# Patient Record
Sex: FEMALE | Race: WHITE | Employment: OTHER | URBAN - METROPOLITAN AREA
[De-identification: names, ages, dates, MRNs, and addresses within clinical notes are randomized per-mention and may not be internally consistent; named-entity substitution may affect disease eponyms.]

---

## 2017-01-31 ENCOUNTER — OFFICE VISIT (OUTPATIENT)
Dept: INTERNAL MEDICINE CLINIC | Age: 67
End: 2017-01-31

## 2017-01-31 VITALS
RESPIRATION RATE: 16 BRPM | WEIGHT: 178 LBS | OXYGEN SATURATION: 97 % | SYSTOLIC BLOOD PRESSURE: 131 MMHG | HEART RATE: 85 BPM | HEIGHT: 68 IN | TEMPERATURE: 97.7 F | BODY MASS INDEX: 26.98 KG/M2 | DIASTOLIC BLOOD PRESSURE: 83 MMHG

## 2017-01-31 DIAGNOSIS — J45.41 MODERATE PERSISTENT ASTHMA WITH ACUTE EXACERBATION: ICD-10-CM

## 2017-01-31 DIAGNOSIS — J45.20 MILD INTERMITTENT ASTHMA WITHOUT COMPLICATION: ICD-10-CM

## 2017-01-31 DIAGNOSIS — G43.809 OTHER MIGRAINE WITHOUT STATUS MIGRAINOSUS, NOT INTRACTABLE: ICD-10-CM

## 2017-01-31 DIAGNOSIS — J01.90 ACUTE NON-RECURRENT SINUSITIS, UNSPECIFIED LOCATION: Primary | ICD-10-CM

## 2017-01-31 DIAGNOSIS — I10 ESSENTIAL HYPERTENSION: ICD-10-CM

## 2017-01-31 RX ORDER — SUMATRIPTAN 100 MG/1
TABLET, FILM COATED ORAL
Qty: 12 TAB | Refills: 5 | Status: SHIPPED | OUTPATIENT
Start: 2017-01-31 | End: 2017-11-14 | Stop reason: SDUPTHER

## 2017-01-31 RX ORDER — CEFDINIR 300 MG/1
300 CAPSULE ORAL 2 TIMES DAILY
Qty: 20 CAP | Refills: 0 | Status: SHIPPED | OUTPATIENT
Start: 2017-01-31 | End: 2017-03-08

## 2017-01-31 RX ORDER — MONTELUKAST SODIUM 10 MG/1
10 TABLET ORAL DAILY
Qty: 30 TAB | Refills: 5 | Status: SHIPPED | OUTPATIENT
Start: 2017-01-31 | End: 2017-04-26 | Stop reason: SDUPTHER

## 2017-01-31 RX ORDER — GUAIFENESIN AND PSEUDOEPHEDRINE HCL 1200; 120 MG/1; MG/1
1 TABLET, EXTENDED RELEASE ORAL 2 TIMES DAILY
Qty: 60 TAB | Refills: 3
Start: 2017-01-31 | End: 2017-03-08

## 2017-01-31 RX ORDER — PREDNISONE 20 MG/1
TABLET ORAL
Qty: 18 TAB | Refills: 0 | Status: SHIPPED | OUTPATIENT
Start: 2017-01-31 | End: 2017-02-09

## 2017-01-31 RX ORDER — ESCITALOPRAM OXALATE 10 MG/1
10 TABLET ORAL DAILY
COMMUNITY
End: 2017-04-26 | Stop reason: SDUPTHER

## 2017-01-31 NOTE — PROGRESS NOTES
Subjective:   New pt to [facility]. Will see Dr. Toby Mclean with nasal blockage, post nasal drip and bilateral sinus pain for 8 days. Denies shortness of breath, chest pain, abdominal pain, nausea, vomiting,  fever and chills. Symptoms are severe. Recent treatment for similar symptoms? Has tried over-the-counter remedies Afrin prn with partial relief of symptoms. Contacts with similar infections: no. Asthma?:  yes. reports that she has never smoked. She has never used smokeless tobacco.    Review of Systems  Pertinent items are noted in HPI. Objective:   Blood pressure 131/83, pulse 85, temperature 97.7 °F (36.5 °C), temperature source Oral, resp. rate 16, height 5' 8\" (1.727 m), weight 178 lb (80.7 kg), last menstrual period 03/15/1998, SpO2 97 %. General:  alert, cooperative, no distress   Eyes: conjunctivae/corneas clear. Ears: normal TM's and external ear canals AU   Sinuses: paranasal sinuses with mild tenderness, Patient has severely edematous  indurated  nasal tubinates  cannot breathe out of her nose   Mouth:  normal findings: palate normal, tongue midline and normal and oropharynx pink & moist with erythema, but no exudates, ulcers or evidence of thrush   Neck: supple, symmetrical, trachea midline and no adenopathy. Heart: S1 and S2 normal, no murmurs noted. Lungs: clear to auscultation bilaterally   Abdomen: soft, non-tender.  Bowel sounds normal. No masses,  no organomegaly        Assessment/Plan:   Acute sinusitis   History of asthma, not currently in exacerbation  Significant allergic rhinitis  Relatively severe symptoms  Patient has failed  conservative therapy  Restart Singulair  Antibiotics and steroids as below  Can use Mucinex D temporarily, blood pressure is controlled  Orders Placed This Encounter    escitalopram oxalate (LEXAPRO) 10 mg tablet    cefdinir (OMNICEF) 300 mg capsule    predniSONE (DELTASONE) 20 mg tablet    PSEUDOEPHEDRINE-guaiFENesin (MUCINEX D MAXIMUM STRENGTH) Tb12 extended release tablet    montelukast (SINGULAIR) 10 mg tablet    SUMAtriptan (IMITREX) 100 mg tablet       - Seek medical care if symptoms become more severe or if you develop chest pain, shortness of breath, confusion. Contact us if your symptoms fail to improve after 7-10 days Rest as much as possible and stay home from work/school at least 24 hours   after last fever. Wash hand frequently and cough/sneeze into your sleeve to help prevent infection of others. Drink plenty of fluids  - Ibuprofen (Advil, Motrin) 400-800mg every 6 hours or Aleve 220 mg 1-2 pills every 8 hours for fever, headache, pain  - Tylenol extra strength 500 mg every 6 hours for pain, headache, fever  - Nasal saline rinses 2-3 times daily for nasal congestion  - Benadryl (diphenhydramine) 50 mg at night for nasal congestion/allergies  - Pseudophedrine 12-hour tablets twice daily for nasal and inner ear congestion.    - Afrin (oxymetazoline) nasal spray 2 sprays in each nostril twice daily for severe      congestion. Do not use this medication for more than 3 days as it may cause         \"rebound congestion\".

## 2017-01-31 NOTE — MR AVS SNAPSHOT
Visit Information Date & Time Provider Department Dept. Phone Encounter #  
 1/31/2017 10:00 AM Ila Moctezuma MD [facility] [address] [place] [id number] Upcoming Health Maintenance Date Due  
 MEDICARE YEARLY EXAM 4/13/2017 GLAUCOMA SCREENING Q2Y 8/1/2017 Pneumococcal 65+ Low/Medium Risk (2 of 2 - PPSV23) 9/27/2017 FOBT Q 1 YEAR AGE 50-75 9/27/2017 BREAST CANCER SCRN MAMMOGRAM 9/27/2018 DTaP/Tdap/Td series (2 - Td) 9/27/2026 Allergies as of 1/31/2017  Review Complete On: 1/31/2017 By: Niko Johnson Severity Noted Reaction Type Reactions Sulfa (Sulfonamide Antibiotics) High 03/15/2015    Anaphylaxis Current Immunizations  Reviewed on 1/31/2017 Name Date Influenza Vaccine 9/15/2015 Pneumococcal Conjugate (PCV-13) 10/4/2016 Reviewed by Ila Moctezuma MD on 1/31/2017 at 10:44 AM  
You Were Diagnosed With   
  
 Codes Comments Acute non-recurrent sinusitis, unspecified location    -  Primary ICD-10-CM: J01.90 ICD-9-CM: 461.9 Mild intermittent asthma without complication     NXR-03-FQ: J45.20 ICD-9-CM: 493.90 Essential hypertension     ICD-10-CM: I10 
ICD-9-CM: 401.9 Moderate persistent asthma with acute exacerbation     ICD-10-CM: J45.41 
ICD-9-CM: 493.92 Other migraine without status migrainosus, not intractable     ICD-10-CM: G43. [address] Vitals BP Pulse Temp Resp Height(growth percentile) Weight(growth percentile) 131/83 (BP 1 Location: Left arm, BP Patient Position: Sitting) 85 97.7 °F (36.5 °C) (Oral) 16 5' 8\" (1.727 m) 178 lb (80.7 kg) LMP SpO2 BMI OB Status Smoking Status 03/15/1998 97% 27.06 kg/m2 Postmenopausal Never Smoker Vitals History BMI and BSA Data Body Mass Index Body Surface Area  
 27.06 kg/m 2 1.97 m 2 Preferred Pharmacy Pharmacy Name Phone [place] [address] [address] Martha Lenz [phone] Your Updated Medication List  
  
   
This list is accurate as of: 1/31/17 10:54 AM.  Always use your most recent med list.  
  
  
  
  
 albuterol 90 mcg/actuation inhaler Commonly known as:  PROAIR HFA Take 2 Puffs by inhalation every four (4) hours as needed for Wheezing or Shortness of Breath. atorvastatin 40 mg tablet Commonly known as:  LIPITOR Take 1 Tab by mouth nightly. cefdinir 300 mg capsule Commonly known as:  OMNICEF Take 1 Cap by mouth two (2) times a day. fluticasone-salmeterol 250-50 mcg/dose diskus inhaler Commonly known as:  ADVAIR DISKUS Take 1 Puff by inhalation every twelve (12) hours. levothyroxine 50 mcg tablet Commonly known as:  synthroid Take 1 Tab by mouth Daily (before breakfast). LEXAPRO 10 mg tablet Generic drug:  escitalopram oxalate Take 10 mg by mouth daily. losartan-hydroCHLOROthiazide 100-25 mg per tablet Commonly known as:  HYZAAR Take 1 Tab by mouth daily. montelukast 10 mg tablet Commonly known as:  SINGULAIR Take 1 Tab by mouth daily. multivitamin tablet Commonly known as:  ONE A DAY Take 1 Tab by mouth daily. Omega-3-DHA-EPA-Fish Oil 1,000 mg (120 mg-180 mg) Cap Take  by mouth.  
  
 predniSONE 20 mg tablet Commonly known as:  Jade Chill Take 3 pills for 3 days, then 2 pills for 3 days, then 1 pill for 3 days PREVNAR 13 (PF) 0.5 mL Syrg injection Generic drug:  pneumococcal 13 dave conj dip  
inject 0.5 milliliter intramuscularly PSEUDOEPHEDRINE-guaiFENesin Tb12 extended release tablet Commonly known as:  [address] Take 1 Tab by mouth two (2) times a day. SUMAtriptan 100 mg tablet Commonly known as:  IMITREX Take once daily as needed for migraine VITAMIN C 500 mg tablet Generic drug:  ascorbic acid (vitamin C) Take  by mouth.  
  
 zolpidem 5 mg tablet Commonly known as:  AMBIEN  
 Take 1 Tab by mouth nightly as needed for Sleep. Max Daily Amount: 5 mg. Indications: SLEEP-ONSET INSOMNIA Prescriptions Sent to Pharmacy Refills  
 cefdinir (OMNICEF) 300 mg capsule 0 Sig: Take 1 Cap by mouth two (2) times a day. Class: Normal  
 [facility] Martha Lenz Ph #: [phone] Route: Oral  
 predniSONE (DELTASONE) 20 mg tablet 0 Sig: Take 3 pills for 3 days, then 2 pills for 3 days, then 1 pill for 3 days Class: Normal  
 [facility] Ph #: [phone]  
 montelukast (SINGULAIR) 10 mg tablet 5 Sig: Take 1 Tab by mouth daily. Class: Normal  
 [facility] [address] [place] [address] Martha Arias Ph #: [phone] Route: Oral  
 SUMAtriptan (IMITREX) 100 mg tablet 5 Sig: Take once daily as needed for migraine Class: Normal  
 [facility] [address] [place] [address] Rachelle Cordero Martha Garcia Ph #: [phone] Introducing [facility]! Dear Husam Vora: 
Thank you for requesting a [employer] account. Our records indicate that you already have an active [employer] account. You can access your account anytime at [email or web address] Did you know that you can access your hospital and ER discharge instructions at any time in [employer]? You can also review all of your test results from your hospital stay or ER visit. Additional Information If you have questions, please visit the Frequently Asked Questions section of the [employer] website at [email or web address] Remember, Game [employer]ight is NOT to be used for urgent needs. For medical emergencies, dial 911. Now available from your iPhone and Android! Please provide this summary of care documentation to your next provider. Your primary care clinician is listed as Candice Castle.  If you have any questions after today's visit, please call [phone].

## 2017-03-08 ENCOUNTER — OFFICE VISIT (OUTPATIENT)
Dept: FAMILY MEDICINE CLINIC | Facility: CLINIC | Age: 67
End: 2017-03-08

## 2017-03-08 VITALS
WEIGHT: 176.9 LBS | HEART RATE: 86 BPM | RESPIRATION RATE: 20 BRPM | TEMPERATURE: 98.2 F | HEIGHT: 68 IN | DIASTOLIC BLOOD PRESSURE: 71 MMHG | OXYGEN SATURATION: 97 % | SYSTOLIC BLOOD PRESSURE: 120 MMHG | BODY MASS INDEX: 26.81 KG/M2

## 2017-03-08 DIAGNOSIS — J06.9 UPPER RESPIRATORY TRACT INFECTION, UNSPECIFIED TYPE: ICD-10-CM

## 2017-03-08 DIAGNOSIS — J01.41 ACUTE RECURRENT PANSINUSITIS: Primary | ICD-10-CM

## 2017-03-08 DIAGNOSIS — J02.9 SORE THROAT: ICD-10-CM

## 2017-03-08 LAB
S PYO AG THROAT QL: NEGATIVE
VALID INTERNAL CONTROL?: YES

## 2017-03-08 RX ORDER — FEXOFENADINE HCL AND PSEUDOEPHEDRINE HCI 180; 240 MG/1; MG/1
1 TABLET, EXTENDED RELEASE ORAL DAILY
Qty: 30 TAB | Refills: 0 | Status: SHIPPED | OUTPATIENT
Start: 2017-03-08 | End: 2017-04-07

## 2017-03-08 RX ORDER — PREDNISONE 20 MG/1
20 TABLET ORAL 2 TIMES DAILY
Qty: 10 TAB | Refills: 0 | Status: SHIPPED | OUTPATIENT
Start: 2017-03-08 | End: 2017-03-13

## 2017-03-08 RX ORDER — LEVOFLOXACIN 750 MG/1
750 TABLET ORAL DAILY
Qty: 7 TAB | Refills: 0 | Status: SHIPPED | OUTPATIENT
Start: 2017-03-08 | End: 2017-03-15

## 2017-03-08 NOTE — PATIENT INSTRUCTIONS
Sinusitis: Care Instructions  Your Care Instructions    Sinusitis is an infection of the lining of the sinus cavities in your head. Sinusitis often follows a cold. It causes pain and pressure in your head and face. In most cases, sinusitis gets better on its own in 1 to 2 weeks. But some mild symptoms may last for several weeks. Sometimes antibiotics are needed. Follow-up care is a key part of your treatment and safety. Be sure to make and go to all appointments, and call your doctor if you are having problems. It's also a good idea to know your test results and keep a list of the medicines you take. How can you care for yourself at home? · Take an over-the-counter pain medicine, such as acetaminophen (Tylenol), ibuprofen (Advil, Motrin), or naproxen (Aleve). Read and follow all instructions on the label. · If the doctor prescribed antibiotics, take them as directed. Do not stop taking them just because you feel better. You need to take the full course of antibiotics. · Be careful when taking over-the-counter cold or flu medicines and Tylenol at the same time. Many of these medicines have acetaminophen, which is Tylenol. Read the labels to make sure that you are not taking more than the recommended dose. Too much acetaminophen (Tylenol) can be harmful. · Breathe warm, moist air from a steamy shower, a hot bath, or a sink filled with hot water. Avoid cold, dry air. Using a humidifier in your home may help. Follow the directions for cleaning the machine. · Use saline (saltwater) nasal washes to help keep your nasal passages open and wash out mucus and bacteria. You can buy saline nose drops at a grocery store or drugstore. Or you can make your own at home by adding 1 teaspoon of salt and 1 teaspoon of baking soda to 2 cups of distilled water. If you make your own, fill a bulb syringe with the solution, insert the tip into your nostril, and squeeze gently. Lorrin Fraction your nose.   · Put a hot, wet towel or a warm gel pack on your face 3 or 4 times a day for 5 to 10 minutes each time. · Try a decongestant nasal spray like oxymetazoline (Afrin). Do not use it for more than 3 days in a row. Using it for more than 3 days can make your congestion worse. When should you call for help? Call your doctor now or seek immediate medical care if:  · You have new or worse swelling or redness in your face or around your eyes. · You have a new or higher fever. Watch closely for changes in your health, and be sure to contact your doctor if:  · You have new or worse facial pain. · The mucus from your nose becomes thicker (like pus) or has new blood in it. · You are not getting better as expected. Where can you learn more? Go to http://charan-kamala.info/. Enter X624 in the search box to learn more about \"Sinusitis: Care Instructions. \"  Current as of: July 29, 2016  Content Version: 11.1  © 9526-5931 [employer]. Care instructions adapted under license by [employer] (which disclaims liability or warranty for this information). If you have questions about a medical condition or this instruction, always ask your healthcare professional. [place] [address] any warranty or liability for your use of this information. Sore Throat: Care Instructions  Your Care Instructions    Infection by bacteria or a virus causes most sore throats. Cigarette smoke, dry air, air pollution, allergies, and yelling can also cause a sore throat. Sore throats can be painful and annoying. Fortunately, most sore throats go away on their own. If you have a bacterial infection, your doctor may prescribe antibiotics. Follow-up care is a key part of your treatment and safety. Be sure to make and go to all appointments, and call your doctor if you are having problems. It's also a good idea to know your test results and keep a list of the medicines you take. How can you care for yourself at home?   · If your doctor prescribed antibiotics, take them as directed. Do not stop taking them just because you feel better. You need to take the full course of antibiotics. · Gargle with warm salt water once an hour to help reduce swelling and relieve discomfort. Use 1 teaspoon of salt mixed in 1 cup of warm water. · Take an over-the-counter pain medicine, such as acetaminophen (Tylenol), ibuprofen (Advil, Motrin), or naproxen (Aleve). Read and follow all instructions on the label. · Be careful when taking over-the-counter cold or flu medicines and Tylenol at the same time. Many of these medicines have acetaminophen, which is Tylenol. Read the labels to make sure that you are not taking more than the recommended dose. Too much acetaminophen (Tylenol) can be harmful. · Drink plenty of fluids. Fluids may help soothe an irritated throat. Hot fluids, such as tea or soup, may help decrease throat pain. · Use over-the-counter throat lozenges to soothe pain. Regular cough drops or hard candy may also help. These should not be given to young children because of the risk of choking. · Do not smoke or allow others to smoke around you. If you need help quitting, talk to your doctor about stop-smoking programs and medicines. These can increase your chances of quitting for good. · Use a vaporizer or humidifier to add moisture to your bedroom. Follow the directions for cleaning the machine. When should you call for help? Call your doctor now or seek immediate medical care if:  · You have new or worse trouble swallowing. · Your sore throat gets much worse on one side. Watch closely for changes in your health, and be sure to contact your doctor if you do not get better as expected. Where can you learn more? Go to http://charan-kamala.info/. Enter [id number] in the search box to learn more about \"Sore Throat: Care Instructions. \"  Current as of: July 29, 2016  Content Version: 11.1  © 4975-2971 [employer]. Care instructions adapted under license by [employer] (which disclaims liability or warranty for this information). If you have questions about a medical condition or this instruction, always ask your healthcare professional. [address] any warranty or liability for your use of this information. Rapid Strep Test: About This Test  What is it? A rapid strep test checks the bacteria in your throat to see if strep is the cause of your sore throat. Why is this test done? It may be done so your doctor can find out right away whether you have strep throat. There is another test for strep, called a throat culture, but that test takes a few days to get the results. How can you prepare for the test?  You don't need to do anything before you have this test.  What happens during the test?  · You will be asked to tilt your head back and open your mouth as wide as possible. · Your doctor will press your tongue down with a flat stick (tongue depressor) and then examine your mouth and throat. · A clean cotton swab will be rubbed over the back of your throat, around your tonsils, and over any red areas or sores to collect a sample. How long does the test take? · The test takes less than a minute. · Results are available in 10 to 15 minutes. When should you call for help? Call your doctor now or seek immediate medical care if:  · Your pain gets worse on one side of your throat, or you have trouble opening your mouth. · You have a new or higher fever, or you have a fever with a stiff neck or severe headache. · Swallowing becomes harder, or you have any trouble breathing. · You are sensitive to light or feel very sleepy or confused. Watch closely for changes in your health, and be sure to contact your doctor if:  · You do not start to feel better after 2 days. Follow-up care is a key part of your treatment and safety.  Be sure to make and go to all appointments, and call your doctor if you are having problems. It's also a good idea to keep a list of the medicines you take. Ask your doctor when you can expect to have your test results. Where can you learn more? Go to http://charan-kamala.info/. Enter B356 in the search box to learn more about \"Rapid Strep Test: About This Test.\"  Current as of: July 29, 2016  Content Version: 11.1  © 2006-2016 [employer]. Care instructions adapted under license by [employer] (which disclaims liability or warranty for this information). If you have questions about a medical condition or this instruction, always ask your healthcare professional. [address] any warranty or liability for your use of this information. Upper Respiratory Infection (Cold): Care Instructions  Your Care Instructions    An upper respiratory infection, or URI, is an infection of the nose, sinuses, or throat. URIs are spread by coughs, sneezes, and direct contact. The common cold is the most frequent kind of URI. The flu and sinus infections are other kinds of URIs. Almost all URIs are caused by viruses. Antibiotics won't cure them. But you can treat most infections with home care. This may include drinking lots of fluids and taking over-the-counter pain medicine. You will probably feel better in 4 to 10 days. The doctor has checked you carefully, but problems can develop later. If you notice any problems or new symptoms, get medical treatment right away. Follow-up care is a key part of your treatment and safety. Be sure to make and go to all appointments, and call your doctor if you are having problems. It's also a good idea to know your test results and keep a list of the medicines you take. How can you care for yourself at home? · To prevent dehydration, drink plenty of fluids, enough so that your urine is light yellow or clear like water.  Choose water and other caffeine-free clear liquids until you feel better. If you have kidney, heart, or liver disease and have to limit fluids, talk with your doctor before you increase the amount of fluids you drink. · Take an over-the-counter pain medicine, such as acetaminophen (Tylenol), ibuprofen (Advil, Motrin), or naproxen (Aleve). Read and follow all instructions on the label. · Before you use cough and cold medicines, check the label. These medicines may not be safe for young children or for people with certain health problems. · Be careful when taking over-the-counter cold or flu medicines and Tylenol at the same time. Many of these medicines have acetaminophen, which is Tylenol. Read the labels to make sure that you are not taking more than the recommended dose. Too much acetaminophen (Tylenol) can be harmful. · Get plenty of rest.  · Do not smoke or allow others to smoke around you. If you need help quitting, talk to your doctor about stop-smoking programs and medicines. These can increase your chances of quitting for good. When should you call for help? Call 911 anytime you think you may need emergency care. For example, call if:  · You have severe trouble breathing. Call your doctor now or seek immediate medical care if:  · You seem to be getting much sicker. · You have new or worse trouble breathing. · You have a new or higher fever. · You have a new rash. Watch closely for changes in your health, and be sure to contact your doctor if:  · You have a new symptom, such as a sore throat, an earache, or sinus pain. · You cough more deeply or more often, especially if you notice more mucus or a change in the color of your mucus. · You do not get better as expected. Where can you learn more? Go to http://charan-kamala.info/. Enter D547 in the search box to learn more about \"Upper Respiratory Infection (Cold): Care Instructions. \"  Current as of: June 30, 2016  Content Version: 11.1  © 5418-2379 [employer]. Care instructions adapted under license by [employer] (which disclaims liability or warranty for this information). If you have questions about a medical condition or this instruction, always ask your healthcare professional. [address] any warranty or liability for your use of this information.
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
## 2017-04-17 DIAGNOSIS — Z76.0 MEDICATION REFILL: ICD-10-CM

## 2017-04-17 NOTE — TELEPHONE ENCOUNTER
Has apt scheduled for next Wednesday but will run out of medication prior to the appointment date.  jong # [phone] and pharmacy

## 2017-04-18 RX ORDER — LOSARTAN POTASSIUM AND HYDROCHLOROTHIAZIDE 25; 100 MG/1; MG/1
1 TABLET ORAL DAILY
Qty: 30 TAB | Refills: 0 | Status: SHIPPED | OUTPATIENT
Start: 2017-04-18 | End: 2017-04-26 | Stop reason: SDUPTHER

## 2017-04-26 ENCOUNTER — HOSPITAL ENCOUNTER (OUTPATIENT)
Dept: LAB | Age: 67
Discharge: HOME | End: 2017-04-26
Payer: MEDICARE

## 2017-04-26 ENCOUNTER — OFFICE VISIT (OUTPATIENT)
Dept: INTERNAL MEDICINE CLINIC | Age: 67
End: 2017-04-26

## 2017-04-26 VITALS
DIASTOLIC BLOOD PRESSURE: 79 MMHG | TEMPERATURE: 98 F | OXYGEN SATURATION: 93 % | WEIGHT: 175 LBS | HEIGHT: 68 IN | SYSTOLIC BLOOD PRESSURE: 134 MMHG | BODY MASS INDEX: 26.52 KG/M2 | RESPIRATION RATE: 18 BRPM | HEART RATE: 91 BPM

## 2017-04-26 DIAGNOSIS — Z76.0 MEDICATION REFILL: ICD-10-CM

## 2017-04-26 DIAGNOSIS — Z13.39 SCREENING FOR ALCOHOLISM: ICD-10-CM

## 2017-04-26 DIAGNOSIS — L81.8 HISTORY OF BEING TATOOED: ICD-10-CM

## 2017-04-26 DIAGNOSIS — E03.9 HYPOTHYROIDISM, ADULT: Primary | ICD-10-CM

## 2017-04-26 DIAGNOSIS — R53.83 FATIGUE, UNSPECIFIED TYPE: ICD-10-CM

## 2017-04-26 DIAGNOSIS — Z12.39 BREAST CANCER SCREENING: ICD-10-CM

## 2017-04-26 DIAGNOSIS — F32.89 OTHER DEPRESSION: ICD-10-CM

## 2017-04-26 DIAGNOSIS — I10 ESSENTIAL HYPERTENSION: ICD-10-CM

## 2017-04-26 DIAGNOSIS — Z00.00 WELL ADULT EXAM: ICD-10-CM

## 2017-04-26 DIAGNOSIS — Z12.11 COLON CANCER SCREENING: ICD-10-CM

## 2017-04-26 DIAGNOSIS — G47.00 INSOMNIA, UNSPECIFIED TYPE: ICD-10-CM

## 2017-04-26 DIAGNOSIS — Z23 ENCOUNTER FOR IMMUNIZATION: ICD-10-CM

## 2017-04-26 DIAGNOSIS — E55.9 VITAMIN D DEFICIENCY: ICD-10-CM

## 2017-04-26 DIAGNOSIS — J45.41 MODERATE PERSISTENT ASTHMA WITH ACUTE EXACERBATION: ICD-10-CM

## 2017-04-26 DIAGNOSIS — Z00.00 ROUTINE GENERAL MEDICAL EXAMINATION AT A HEALTH CARE FACILITY: ICD-10-CM

## 2017-04-26 DIAGNOSIS — Z12.83 SKIN CANCER SCREENING: ICD-10-CM

## 2017-04-26 PROCEDURE — 85027 COMPLETE CBC AUTOMATED: CPT

## 2017-04-26 PROCEDURE — 80053 COMPREHEN METABOLIC PANEL: CPT

## 2017-04-26 PROCEDURE — 84443 ASSAY THYROID STIM HORMONE: CPT

## 2017-04-26 PROCEDURE — 86803 HEPATITIS C AB TEST: CPT

## 2017-04-26 PROCEDURE — 82306 VITAMIN D 25 HYDROXY: CPT

## 2017-04-26 PROCEDURE — 80061 LIPID PANEL: CPT

## 2017-04-26 RX ORDER — LEVOTHYROXINE SODIUM 50 UG/1
50 TABLET ORAL
Qty: 90 TAB | Refills: 1 | Status: SHIPPED | OUTPATIENT
Start: 2017-04-26 | End: 2018-01-17 | Stop reason: SDUPTHER

## 2017-04-26 RX ORDER — MONTELUKAST SODIUM 10 MG/1
10 TABLET ORAL DAILY
Qty: 30 TAB | Refills: 5 | Status: SHIPPED | OUTPATIENT
Start: 2017-04-26 | End: 2017-11-14 | Stop reason: SDUPTHER

## 2017-04-26 RX ORDER — LOSARTAN POTASSIUM AND HYDROCHLOROTHIAZIDE 25; 100 MG/1; MG/1
1 TABLET ORAL DAILY
Qty: 90 TAB | Refills: 3 | Status: SHIPPED | OUTPATIENT
Start: 2017-04-26 | End: 2017-11-14 | Stop reason: SDUPTHER

## 2017-04-26 RX ORDER — ALBUTEROL SULFATE 90 UG/1
2 AEROSOL, METERED RESPIRATORY (INHALATION)
Qty: 2 INHALER | Refills: 3 | Status: SHIPPED | OUTPATIENT
Start: 2017-04-26 | End: 2017-11-14 | Stop reason: SDUPTHER

## 2017-04-26 RX ORDER — ZOLPIDEM TARTRATE 5 MG/1
5 TABLET ORAL
Qty: 30 TAB | Refills: 0 | Status: SHIPPED | OUTPATIENT
Start: 2017-04-26 | End: 2017-11-14 | Stop reason: SDUPTHER

## 2017-04-26 RX ORDER — FLUTICASONE PROPIONATE AND SALMETEROL 250; 50 UG/1; UG/1
1 POWDER RESPIRATORY (INHALATION) EVERY 12 HOURS
Qty: 3 INHALER | Refills: 3 | Status: SHIPPED | OUTPATIENT
Start: 2017-04-26 | End: 2017-11-14 | Stop reason: ALTCHOICE

## 2017-04-26 RX ORDER — ESCITALOPRAM OXALATE 10 MG/1
10 TABLET ORAL DAILY
Qty: 90 TAB | Refills: 1 | Status: SHIPPED | OUTPATIENT
Start: 2017-04-26 | End: 2017-09-01 | Stop reason: SDUPTHER

## 2017-04-26 RX ORDER — ATORVASTATIN CALCIUM 40 MG/1
40 TABLET, FILM COATED ORAL
Qty: 90 TAB | Refills: 3 | Status: SHIPPED | OUTPATIENT
Start: 2017-04-26 | End: 2017-11-14 | Stop reason: SDUPTHER

## 2017-04-26 NOTE — PATIENT INSTRUCTIONS
Medicare Part B Preventive Services Limitations Recommendation Scheduled   Bone Mass Measurement  (age 72 & older, biennial) Requires diagnosis related to osteoporosis or estrogen deficiency. Biennial benefit unless patient has history of long-term glucocorticoid tx or baseline is needed because initial test was by other method     Cardiovascular Screening Blood Tests (every 5 years)  Total cholesterol, HDL, Triglycerides Order as a panel if possible     Colorectal Cancer Screening  -Fecal occult blood test (annual)  -Flexible sigmoidoscopy (5y)  -Screening colonoscopy (10y)  -Barium Enema      Counseling to Prevent Tobacco Use (up to 8 sessions per year)  - Counseling greater than 3 and up to 10 minutes  - Counseling greater than 10 minutes Patients must be asymptomatic of tobacco-related conditions to receive as preventive service     Diabetes Screening Tests (at least every 3 years, Medicare covers annually or at 6-month intervals for prediabetic patients)    Fasting blood sugar (FBS) or glucose tolerance test (GTT) Patient must be diagnosed with one of the following:  -Hypertension, Dyslipidemia, obesity, previous impaired FBS or GTT  Or any two of the following: overweight, FH of diabetes, age ? 72, history of gestational diabetes, birth of baby weighing more than 9 pounds     Diabetes Self-Management Training (DSMT) (no USPSTF recommendation) Requires referral by treating physician for patient with diabetes or renal disease. 10 hours of initial DSMT session of no less than 30 minutes each in a continuous 12-month period. 2 hours of follow-up DSMT in subsequent years.      Glaucoma Screening (no USPSTF recommendation) Diabetes mellitus, family history, , age 48 or over,  American, age 72 or over     Human Immunodeficiency Virus (HIV) Screening (annually for increased risk patients)  HIV-1 and HIV-2 by EIA, MICAH, rapid antibody test, or oral mucosa transudate Patient must be at increased risk for HIV infection per USPSTF guidelines or pregnant. Tests covered annually for patients at increased risk. Pregnant patients may receive up to 3 test during pregnancy. Medical Nutrition Therapy (MNT) (for diabetes or renal disease not recommended schedule) Requires referral by treating physician for patient with diabetes or renal disease. Can be provided in same year as diabetes self-management training (DSMT), and CMS recommends medical nutrition therapy take place after DSMT. Up to 3 hours for initial year and 2 hours in subsequent years. Shingles Vaccination A shingles vaccine is also recommended once in a lifetime after age 61     Seasonal Influenza Vaccination (annually)      Pneumococcal Vaccination (once after 72)      Hepatitis B Vaccinations (if medium/high risk) Medium/high risk factors:  End-stage renal disease,  Hemophiliacs who received Factor VIII or IX concentrates, Clients of institutions for the mentally retarded, Persons who live in the same house as a HepB virus carrier, Homosexual men, Illicit injectable drug abusers. Screening Mammography (biennial age 54-69) Annually (age 36 or over)     Screening Pap Tests and Pelvic Examination (up to age 79 and after 79 if unknown history or abnormal study last 10 years) Every 25 months except high risk     Ultrasound Screening for Abdominal Aortic Aneurysm (AAA) (once) Patient must be referred through Replaced by [facility] and not have had a screening for abdominal aortic aneurysm before under Medicare.   Limited to patients who meet one of the following criteria:  - Men who are 73-68 years old and have smoked more than 100 cigarettes in their lifetime.  -Anyone with a FH of AAA  -Anyone recommended for screening by USPSTF
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
## 2017-04-26 NOTE — PROGRESS NOTES
Erick Akins is a 69 y.o. female and presents with annual wellness visit . This pt is actually NEW to me and here to follow up on cholesterol and ashtma. . She is also here for her wellness visit for medicare. Please see last part of note re: medicare information    Pt is recently  March 2017. She is retired from [employer] in [place] for [employer]. Subjective:  Cardiovascular Review:  The patient has hypertension and hyperlipidemia. Diet and Lifestyle: generally follows a low fat low cholesterol diet, exercises regularly, nonsmoker  Home BP Monitoring: is not measured at home. Pertinent ROS: taking medications as instructed, no medication side effects noted, no TIA's, no chest pain on exertion, no dyspnea on exertion, no swelling of ankles. Asthma Review:  The patient is being seen for follow up of asthma, not currently in exacerbation. Asthma symptoms occur: infrequently. Wheezing when present is described as mild and easily relieved with rescue bronchodilator. Current limitations in activity from asthma: none. Number of days of school or work missed in the last month: 0. Regimen compliance: The patient reports adherence to this regimen. Patient does not smoke cigarettes. Allergy sx and moving into her 's house    Thyroid Review:  Patient is seen for followup of hypothyroidism. Thyroid ROS: denies fatigue, weight changes, heat/cold intolerance, bowel/skin changes or CVS symptoms. Depression Review:  Patient is seen for followup of depression. Treatment includes SSRI and no other therapies. Ongoing symptoms include none feels good. She denies depressed mood. She experiences the following side effects from the treatment: none.     Migraine  Worse in spring season and fall       Review of Systems  Constitutional: negative for fevers, chills, anorexia and weight loss  Eyes:   negative for visual disturbance and irritation  ENT:   negative for tinnitus,sore throat,nasal congestion,ear pains. hoarseness  Respiratory:  negative for cough, hemoptysis, dyspnea,wheezing  CV:   negative for chest pain, palpitations, lower extremity edema  GI:   negative for nausea, vomiting, diarrhea, abdominal pain,melena  Endo:               negative for polyuria,polydipsia,polyphagia,heat intolerance  Genitourinary: negative for frequency, dysuria and hematuria  Integument:  negative for rash and pruritus  Hematologic:  negative for easy bruising and gum/nose bleeding  Musculoskel: negative for myalgias, arthralgias, back pain, muscle weakness, joint pain  Neurological:  negative for headaches, dizziness, vertigo, memory problems and gait   Behavl/Psych: negative for feelings of anxiety, depression, mood changes    Past Medical History:   Diagnosis Date    Asthma     Elevated lipids 9/15/2015    Essential hypertension 9/15/2015    Family history of skin cancer     Hypothyroidism, adult 9/15/2015    Migraine     allergy mediated in sping and fall    Sun-damaged skin      Past Surgical History:   Procedure Laterality Date    HX  SECTION      HX CHOLECYSTECTOMY      HX TONSILLECTOMY       Social History     Social History    Marital status:      Spouse name: N/A    Number of children: N/A    Years of education: N/A     Social History Main Topics    Smoking status: Never Smoker    Smokeless tobacco: Never Used    Alcohol use 3.5 oz/week     7 Glasses of wine per week    Drug use: No    Sexual activity: Yes     Partners: Male     Other Topics Concern    None     Social History Narrative    Retired from [employer] in [place]    Stressful        Hx of situational stress     passed cancer duodenal    Mother was sick 79 yo with MS actually did well        Recently   In 2017        1 son, doing well     Family History   Problem Relation Age of Onset    MS Mother     Cancer Mother      uterus/skin cancer    Cancer Father 72     colon    Parkinson's Disease Sister 61    MS Brother      Current Outpatient Prescriptions   Medication Sig Dispense Refill    SYNTHROID 50 mcg tablet TAKE ONE TABLET BY MOUTH DAILY BEFORE BREAKFAST 90 Tab 1    losartan-hydroCHLOROthiazide (HYZAAR) 100-25 mg per tablet Take 1 Tab by mouth daily. 30 Tab 0    escitalopram oxalate (LEXAPRO) 10 mg tablet Take 10 mg by mouth daily.  montelukast (SINGULAIR) 10 mg tablet Take 1 Tab by mouth daily. 30 Tab 5    SUMAtriptan (IMITREX) 100 mg tablet Take once daily as needed for migraine 12 Tab 5    fluticasone-salmeterol (ADVAIR DISKUS) 250-50 mcg/dose diskus inhaler Take 1 Puff by inhalation every twelve (12) hours. 3 Inhaler 3    atorvastatin (LIPITOR) 40 mg tablet Take 1 Tab by mouth nightly. 90 Tab 3    Omega-3-DHA-EPA-Fish Oil 1,000 mg (120 mg-180 mg) cap Take  by mouth.  multivitamin (ONE A DAY) tablet Take 1 Tab by mouth daily.  ascorbic acid (VITAMIN C) 500 mg tablet Take  by mouth.  albuterol (PROAIR HFA) 90 mcg/actuation inhaler Take 2 Puffs by inhalation every four (4) hours as needed for Wheezing or Shortness of Breath. 1 Inhaler 3    zolpidem (AMBIEN) 5 mg tablet Take 1 Tab by mouth nightly as needed for Sleep. Max Daily Amount: 5 mg.  Indications: SLEEP-ONSET INSOMNIA 30 Tab 0     Allergies   Allergen Reactions    Sulfa (Sulfonamide Antibiotics) Anaphylaxis       Objective:  Visit Vitals    /79 (BP 1 Location: Left arm, BP Patient Position: Sitting)    Pulse 91    Temp 98 °F (36.7 °C) (Oral)    Resp 18    Ht 5' 8\" (1.727 m)    Wt 175 lb (79.4 kg)    LMP 03/15/1998    SpO2 93%    BMI 26.61 kg/m2     Physical Exam:   General appearance - alert, well appearing, and in no distress  Mental status - alert, oriented to person, place, and time  EYE-TAMIKA, EOMI, corneas normal, no foreign bodies, visual acuity normal both eyes, no periorbital cellulitis  ENT-ENT exam normal, no neck nodes or sinus tenderness  Nose - normal and patent, no erythema, discharge or polyps  Mouth - mucous membranes moist, pharynx normal without lesions  Neck - supple, no significant adenopathy   Chest - clear to auscultation, no wheezes, rales or rhonchi, symmetric air entry   Heart - normal rate, regular rhythm, normal S1, S2, no murmurs, rubs, clicks or gallops   Abdomen - soft, nontender, nondistended, no masses or organomegaly  Lymph- no adenopathy palpable  Ext-peripheral pulses normal, no pedal edema, no clubbing or cyanosis  Skin-Warm and dry. no hyperpigmentation, vitiligo, or suspicious lesions  Neuro -alert, oriented, normal speech, no focal findings or movement disorder noted  Neck-normal C-spine, no tenderness, full ROM without pain      Results for orders placed or performed in visit on 03/08/17   AMB POC RAPID STREP A   Result Value Ref Range    VALID INTERNAL CONTROL POC Yes     Group A Strep Ag Negative Negative     Prevention    Cardiovascular profile  Family hx  Exercising:  Walking dogs of trails 5 miles /day  Blood pressure:  Health healthy diet:  Diabetes:  Cholesterol:  Renal function:      Cancer risk profile  Mammogram  Colonoscopy  Skin nonhealing in 2 weeks denies lesions  Gyn abnormal bleeding/discharge/abd pain/pressure      Thyroid sx    Osteopenia prevention  Calcium 1000mg/day yes  Vitamin D 800iu/day yes    Mental health scale: 9/10  Depression  Anxiety  Sleep # of hours:  Energy Level:        Immunizations  TDAP  Pneumonia vaccine  Flu vaccine  Shingles vaccine  HPV        Ramesh Peguero was seen today for annual wellness visit. Diagnoses and all orders for this visit:    Hypothyroidism, adult  -     levothyroxine (SYNTHROID) 50 mcg tablet; Take 1 Tab by mouth Daily (before breakfast). Medication refill  -     atorvastatin (LIPITOR) 40 mg tablet; Take 1 Tab by mouth nightly. -     fluticasone-salmeterol (ADVAIR DISKUS) 250-50 mcg/dose diskus inhaler; Take 1 Puff by inhalation every twelve (12) hours.   -     albuterol (PROAIR HFA) 90 mcg/actuation inhaler; Take 2 Puffs by inhalation every four (4) hours as needed for Wheezing or Shortness of Breath. Moderate persistent asthma with acute exacerbation  -     montelukast (SINGULAIR) 10 mg tablet; Take 1 Tab by mouth daily. Insomnia, unspecified type  discsussed prefer use sparingly, she agrees, sed and sed re: long term use  -     zolpidem (AMBIEN) 5 mg tablet; Take 1 Tab by mouth nightly as needed for Sleep. Max Daily Amount: 5 mg. Indications: SLEEP-ONSET INSOMNIA    Other orders  -     escitalopram oxalate (LEXAPRO) 10 mg tablet; Take 1 Tab by mouth daily. This note will not be viewable in [address] Will follow up in 6 months for labs and bp evaluation                This is a Subsequent Medicare Annual Wellness Visit providing Personalized Prevention Plan Services (PPPS) (Performed 12 months after initial AWV and PPPS )    I have reviewed the patient's medical history in detail and updated the computerized patient record. History     Past Medical History:   Diagnosis Date    Asthma     Elevated lipids 9/15/2015    Essential hypertension 9/15/2015    Hypothyroidism, adult 9/15/2015    Migraine     allergy mediated in sping and fall    Sun-damaged skin       Past Surgical History:   Procedure Laterality Date    HX  SECTION      HX CHOLECYSTECTOMY      HX TONSILLECTOMY       Current Outpatient Prescriptions   Medication Sig Dispense Refill    atorvastatin (LIPITOR) 40 mg tablet Take 1 Tab by mouth nightly. 90 Tab 3    montelukast (SINGULAIR) 10 mg tablet Take 1 Tab by mouth daily. 30 Tab 5    escitalopram oxalate (LEXAPRO) 10 mg tablet Take 1 Tab by mouth daily. 90 Tab 1    fluticasone-salmeterol (ADVAIR DISKUS) 250-50 mcg/dose diskus inhaler Take 1 Puff by inhalation every twelve (12) hours. 3 Inhaler 3    levothyroxine (SYNTHROID) 50 mcg tablet Take 1 Tab by mouth Daily (before breakfast).  90 Tab 1    zolpidem (AMBIEN) 5 mg tablet Take 1 Tab by mouth nightly as needed for Sleep. Max Daily Amount: 5 mg. Indications: SLEEP-ONSET INSOMNIA 30 Tab 0    albuterol (PROAIR HFA) 90 mcg/actuation inhaler Take 2 Puffs by inhalation every four (4) hours as needed for Wheezing or Shortness of Breath. 2 Inhaler 3    losartan-hydroCHLOROthiazide (HYZAAR) 100-25 mg per tablet Take 1 Tab by mouth daily. 90 Tab 3    SUMAtriptan (IMITREX) 100 mg tablet Take once daily as needed for migraine 12 Tab 5    Omega-3-DHA-EPA-Fish Oil 1,000 mg (120 mg-180 mg) cap Take  by mouth.  multivitamin (ONE A DAY) tablet Take 1 Tab by mouth daily.  ascorbic acid (VITAMIN C) 500 mg tablet Take  by mouth. Allergies   Allergen Reactions    Sulfa (Sulfonamide Antibiotics) Anaphylaxis     Family History   Problem Relation Age of Onset   [place] MS Mother    [place] Cancer Mother      uterus/skin cancer    Cancer Father 72     colon    Parkinson's Disease Sister 61    MS Brother      Social History   Substance Use Topics    Smoking status: Never Smoker    Smokeless tobacco: Never Used    Alcohol use 3.5 oz/week     7 Glasses of wine per week     Patient Active Problem List   Diagnosis Code    Hypothyroidism, adult E03.9    Essential hypertension I10    Elevated lipids E78.5    Skin lesions, generalized L98.9    Asthma J45.909       Depression Risk Factor Screening:     PHQ 2 / 9, over the last two weeks 3/15/2016   Little interest or pleasure in doing things Not at all   Feeling down, depressed or hopeless Not at all   Total Score PHQ 2 0     Alcohol Risk Factor Screening: On any occasion during the past 3 months, have you had more than 3 drinks containing alcohol? No    Do you average more than 7 drinks per week? No      Functional Ability and Level of Safety:     Hearing Loss   none    Activities of Daily Living   Self-care. Requires assistance with: no ADLs    Fall Risk     Fall Risk Assessment, last 12 mths 3/15/2016   Able to walk? Yes   Fall in past 12 months?  No   Fall with injury? -   Number of falls in past 12 months -   Fall Risk Score -     Abuse Screen   Patient is not abused    Review of Systems   Constitutional: negative  Eyes: negative  Ears, nose, mouth, throat, and face: negative for hearing loss, tinnitus, ear drainage, earaches and nasal congestion  Respiratory: negative for cough, sputum, hemoptysis or pleurisy/chest pain  Cardiovascular: negative for chest pressure/discomfort, dyspnea, palpitations, irregular heart beats, near-syncope  Gastrointestinal: negative for dysphagia, odynophagia, dyspepsia, nausea and vomiting  Genitourinary:negative for frequency, dysuria, nocturia, urinary incontinence and hematuria  Hematologic/lymphatic: negative for easy bruising, bleeding and lymphadenopathy  Musculoskeletal:negative for myalgias, arthralgias, stiff joints and back pain  Neurological: negative for dizziness, vertigo, memory problems and speech problems    Physical Examination     Evaluation of Cognitive Function:  Mood/affect:  happy  Appearance: age appropriate  Family member/caregiver input: none    No exam performed today, not indicated for this part of visit. Patient Care Team:  Suzi Curiel MD as PCP - General (Internal Medicine)  Parish Santiago MD as Physician (Sleep Medicine)    Advice/Referrals/Counseling   Education and counseling provided:  End-of-Life planning (with patient's consent)  Pneumococcal Vaccine  Influenza Vaccine  Hepatitis B Vaccine  Screening Mammography  Colorectal cancer screening tests  Bone mass measurement (DEXA)      Assessment/Plan   Domitila Aburto was seen today for annual wellness visit. Diagnoses and all orders for this visit:    Pt Medicare wellness completed. Breast cancer screening  -     FRANTZ MAMMO BI SCREENING INCL CAD;  Future    Colon cancer screening  -     REFERRAL TO GASTROENTEROLOGY    Skin cancer screening  -     REFERRAL TO DERMATOLOGY    History of being tatooed  -     HEPATITIS C AB    Well adult exam    Routine general medical examination at a health care facility    Screening for alcoholism    Other orders  -     escitalopram oxalate (LEXAPRO) 10 mg tablet; Take 1 Tab by mouth daily. .      This note will not be viewable in [address]

## 2017-04-27 LAB
25(OH)D3+25(OH)D2 SERPL-MCNC: 49.1 NG/ML (ref 30–100)
ALBUMIN SERPL-MCNC: 4.4 G/DL (ref 3.6–4.8)
ALBUMIN/GLOB SERPL: 2 {RATIO} (ref 1.2–2.2)
ALP SERPL-CCNC: 60 IU/L (ref 39–117)
ALT SERPL-CCNC: 23 IU/L (ref 0–32)
AST SERPL-CCNC: 24 IU/L (ref 0–40)
BILIRUB SERPL-MCNC: 0.5 MG/DL (ref 0–1.2)
BUN SERPL-MCNC: 10 MG/DL (ref 8–27)
BUN/CREAT SERPL: 14 (ref 12–28)
CALCIUM SERPL-MCNC: 9.7 MG/DL (ref 8.7–10.3)
CHLORIDE SERPL-SCNC: 99 MMOL/L (ref 96–106)
CHOLEST SERPL-MCNC: 154 MG/DL (ref 100–199)
CO2 SERPL-SCNC: 28 MMOL/L (ref 18–29)
CREAT SERPL-MCNC: 0.73 MG/DL (ref 0.57–1)
ERYTHROCYTE [DISTWIDTH] IN BLOOD BY AUTOMATED COUNT: 14 % (ref 12.3–15.4)
GLOBULIN SER CALC-MCNC: 2.2 G/DL (ref 1.5–4.5)
GLUCOSE SERPL-MCNC: 97 MG/DL (ref 65–99)
HCT VFR BLD AUTO: 42.8 % (ref 34–46.6)
HCV AB S/CO SERPL IA: <0.1 S/CO RATIO (ref 0–0.9)
HDLC SERPL-MCNC: 72 MG/DL
HGB BLD-MCNC: 14.1 G/DL (ref 11.1–15.9)
INTERPRETATION, 910389: NORMAL
LDLC SERPL CALC-MCNC: 69 MG/DL (ref 0–99)
MCH RBC QN AUTO: 28.8 PG (ref 26.6–33)
MCHC RBC AUTO-ENTMCNC: 32.9 G/DL (ref 31.5–35.7)
MCV RBC AUTO: 88 FL (ref 79–97)
PLATELET # BLD AUTO: 296 X10E3/UL (ref 150–379)
POTASSIUM SERPL-SCNC: 3.7 MMOL/L (ref 3.5–5.2)
PROT SERPL-MCNC: 6.6 G/DL (ref 6–8.5)
RBC # BLD AUTO: 4.89 X10E6/UL (ref 3.77–5.28)
SODIUM SERPL-SCNC: 144 MMOL/L (ref 134–144)
TRIGL SERPL-MCNC: 67 MG/DL (ref 0–149)
TSH SERPL DL<=0.005 MIU/L-ACNC: 2.02 UIU/ML (ref 0.45–4.5)
VLDLC SERPL CALC-MCNC: 13 MG/DL (ref 5–40)
WBC # BLD AUTO: 6.6 X10E3/UL (ref 3.4–10.8)

## 2017-05-23 ENCOUNTER — HOSPITAL ENCOUNTER (OUTPATIENT)
Dept: MAMMOGRAPHY | Age: 67
Discharge: HOME | End: 2017-05-23
Attending: INTERNAL MEDICINE
Payer: MEDICARE

## 2017-05-23 DIAGNOSIS — Z12.31 VISIT FOR SCREENING MAMMOGRAM: ICD-10-CM

## 2017-05-23 PROCEDURE — 77067 SCR MAMMO BI INCL CAD: CPT

## 2017-07-11 ENCOUNTER — OFFICE VISIT (OUTPATIENT)
Dept: INTERNAL MEDICINE CLINIC | Age: 67
End: 2017-07-11

## 2017-07-11 VITALS
WEIGHT: 175 LBS | HEART RATE: 99 BPM | HEIGHT: 68 IN | DIASTOLIC BLOOD PRESSURE: 58 MMHG | SYSTOLIC BLOOD PRESSURE: 107 MMHG | RESPIRATION RATE: 14 BRPM | BODY MASS INDEX: 26.52 KG/M2 | TEMPERATURE: 98.1 F | OXYGEN SATURATION: 96 %

## 2017-07-11 DIAGNOSIS — J45.20 MILD INTERMITTENT ASTHMA WITHOUT COMPLICATION: ICD-10-CM

## 2017-07-11 DIAGNOSIS — H57.9 ALLERGIC EYE REACTION: Primary | ICD-10-CM

## 2017-07-11 DIAGNOSIS — I10 ESSENTIAL HYPERTENSION: ICD-10-CM

## 2017-07-11 RX ORDER — METHYLPREDNISOLONE 4 MG/1
TABLET ORAL
Qty: 1 DOSE PACK | Refills: 0 | Status: SHIPPED | OUTPATIENT
Start: 2017-07-11 | End: 2017-08-22 | Stop reason: ALTCHOICE

## 2017-07-11 RX ORDER — METHYLPREDNISOLONE 4 MG/1
TABLET ORAL
Qty: 1 DOSE PACK | Refills: 0 | Status: SHIPPED | OUTPATIENT
Start: 2017-07-11 | End: 2017-07-11 | Stop reason: SDUPTHER

## 2017-07-11 NOTE — PROGRESS NOTES
HISTORY OF PRESENT ILLNESS  Juana Reyna is a 69 y.o. female. HPI   Patient presetns today for eye itchiness. She states that she wakes up with itchiness in her eyes. In addition she states she has some stabbing pain in her eyes. She notes her eyes have been watering and sticky. Patient notes some itchiness in her hands as well. Patient denies nausea, SOB, wheezing, or fever. She states she has been trying to treat with OTC medications. She states she went 4 days without makeup and there was no change in her condition. Patient reports she got  in March and moved in with her  who has a cat. She states she has only had dogs and does not know if she is allergic to cats. Patient reports her breathing has not been affected by the eye itchiness. Hypertension ROS: taking medications as instructed, no medication side effects noted, no TIA's, no chest pain on exertion, no dyspnea on exertion, no swelling of ankles. New concerns:  Patient's BP in office today is 107/58. Review of Systems   All other systems reviewed and are negative. Physical Exam   Constitutional: She is oriented to person, place, and time. She appears well-developed and well-nourished. HENT:   Head: Normocephalic and atraumatic. Right Ear: External ear normal.   Left Ear: External ear normal.   Nose: Nose normal.   Mouth/Throat: Oropharynx is clear and moist.   Eyes: Conjunctivae and EOM are normal.   Neck: Normal range of motion. Neck supple. Carotid bruit is not present. No thyroid mass and no thyromegaly present. Cardiovascular: Normal rate, regular rhythm, S1 normal, S2 normal, normal heart sounds and intact distal pulses. Pulmonary/Chest: Effort normal and breath sounds normal.   Abdominal: Soft. Normal appearance and bowel sounds are normal. There is no hepatosplenomegaly. There is no tenderness. Musculoskeletal: Normal range of motion. Neurological: She is alert and oriented to person, place, and time.  She has normal strength. No cranial nerve deficit or sensory deficit. Coordination normal.   Skin: Skin is warm, dry and intact. No abrasion and no rash noted. Psychiatric: She has a normal mood and affect. Her behavior is normal. Judgment and thought content normal.   Nursing note and vitals reviewed. ASSESSMENT and PLAN  Akbar Acevedo was seen today for itchy eye. Diagnoses and all orders for this visit:    Allergic eye reaction   Eyes do not look infected, they are watery, sticky, and irritated. Prescribed Prednisone as a tempory solution. Encourage patient to look into an allergist and to follow up with optometry for an eye exam. Suspect something at home is exacerbating the eye itchiness. Maybe the fact she moved in with her  in a new house and with a cat and she has a h/o sig allergies when she was younger- she needs to look into this    Mild intermittent asthma without complication  Patient breathing not affected by eye reaction    Essential hypertension  BP is at goal. I do not recommend any change in medications. Other orders  -     Discontinue: methylPREDNISolone (MEDROL DOSEPACK) 4 mg tablet; uad  -     methylPREDNISolone (MEDROL DOSEPACK) 4 mg tablet; uad    lab results and schedule of future lab studies reviewed with patient  reviewed diet, exercise and weight control    Written by Keira Li, as dictated by Shanique Lind MD.     Current diagnosis and concerns discussed with pt at length. Understands risks and benefits or current treatment plan and medications and accepts the treatment and medication with any possible risks. Pt asks appropriate questions which were answered. Pt instructed to call with any concerns or problems.

## 2017-08-22 ENCOUNTER — OFFICE VISIT (OUTPATIENT)
Dept: INTERNAL MEDICINE CLINIC | Age: 67
End: 2017-08-22

## 2017-08-22 VITALS
HEART RATE: 87 BPM | TEMPERATURE: 98.8 F | SYSTOLIC BLOOD PRESSURE: 133 MMHG | BODY MASS INDEX: 26.76 KG/M2 | RESPIRATION RATE: 14 BRPM | DIASTOLIC BLOOD PRESSURE: 75 MMHG | WEIGHT: 176.6 LBS | OXYGEN SATURATION: 96 % | HEIGHT: 68 IN

## 2017-08-22 DIAGNOSIS — J40 BRONCHITIS: ICD-10-CM

## 2017-08-22 DIAGNOSIS — J01.40 ACUTE PANSINUSITIS, RECURRENCE NOT SPECIFIED: Primary | ICD-10-CM

## 2017-08-22 DIAGNOSIS — J45.41 MODERATE PERSISTENT ASTHMA WITH ACUTE EXACERBATION: ICD-10-CM

## 2017-08-22 RX ORDER — FLUTICASONE PROPIONATE 50 MCG
2 SPRAY, SUSPENSION (ML) NASAL DAILY
COMMUNITY
End: 2018-06-28 | Stop reason: ALTCHOICE

## 2017-08-22 RX ORDER — LEVOFLOXACIN 750 MG/1
750 TABLET ORAL DAILY
Qty: 10 TAB | Refills: 0 | Status: SHIPPED | OUTPATIENT
Start: 2017-08-22 | End: 2017-11-14 | Stop reason: ALTCHOICE

## 2017-08-22 RX ORDER — LEVALBUTEROL INHALATION SOLUTION 1.25 MG/3ML
1.25 SOLUTION RESPIRATORY (INHALATION)
Qty: 3 ML | Refills: 0
Start: 2017-08-22 | End: 2017-08-22

## 2017-08-22 RX ORDER — PREDNISONE 20 MG/1
TABLET ORAL
Qty: 18 TAB | Refills: 0 | Status: SHIPPED | OUTPATIENT
Start: 2017-08-22 | End: 2017-11-14 | Stop reason: SDUPTHER

## 2017-08-22 RX ORDER — CODEINE PHOSPHATE AND GUAIFENESIN 10; 100 MG/5ML; MG/5ML
5 SOLUTION ORAL
Qty: 118 ML | Refills: 0 | Status: SHIPPED | OUTPATIENT
Start: 2017-08-22 | End: 2017-11-14 | Stop reason: ALTCHOICE

## 2017-08-22 NOTE — MR AVS SNAPSHOT
Visit Information Date & Time Provider Department Dept. Phone Encounter #  
 8/22/2017  2:00 PM Al Garcia NP [facility] [address] [id number] Upcoming Health Maintenance Date Due  
 GLAUCOMA SCREENING Q2Y 8/1/2017 INFLUENZA AGE 9 TO ADULT 8/1/2017 FOBT Q 1 YEAR AGE 50-75 9/27/2017 MEDICARE YEARLY EXAM 4/27/2018 BREAST CANCER SCRN MAMMOGRAM 5/23/2019 DTaP/Tdap/Td series (2 - Td) 9/27/2026 Allergies as of 8/22/2017  Review Complete On: 8/22/2017 By: Al Garcia NP Severity Noted Reaction Type Reactions Sulfa (Sulfonamide Antibiotics) High 03/15/2015    Anaphylaxis Current Immunizations  Reviewed on 1/31/2017 Name Date Influenza Vaccine 9/15/2015 Pneumococcal Conjugate (PCV-13) 10/4/2016 Not reviewed this visit You Were Diagnosed With   
  
 Codes Comments Acute pansinusitis, recurrence not specified    -  Primary ICD-10-CM: J01.40 ICD-9-CM: 461.8 Bronchitis     ICD-10-CM: J40 ICD-9-CM: 313 Moderate persistent asthma with acute exacerbation     ICD-10-CM: J45.41 
ICD-9-CM: 493.92 Vitals BP Pulse Temp Resp Height(growth percentile) Weight(growth percentile) 133/75 (BP 1 Location: Left arm, BP Patient Position: Sitting) 87 98.8 °F (37.1 °C) (Oral) 14 5' 8\" (1.727 m) 176 lb 9.6 oz (80.1 kg) LMP SpO2 BMI OB Status Smoking Status 03/15/1998 96% 26.85 kg/m2 Postmenopausal Never Smoker BMI and BSA Data Body Mass Index Body Surface Area  
 26.85 kg/m 2 1.96 m 2 Preferred Pharmacy Pharmacy Name Phone [facility] [address] Your Updated Medication List  
  
   
This list is accurate as of: 8/22/17  3:12 PM.  Always use your most recent med list.  
  
  
  
  
 albuterol 90 mcg/actuation inhaler Commonly known as:  PROAIR HFA  
 Take 2 Puffs by inhalation every four (4) hours as needed for Wheezing or Shortness of Breath. atorvastatin 40 mg tablet Commonly known as:  LIPITOR Take 1 Tab by mouth nightly. escitalopram oxalate 10 mg tablet Commonly known as:  Kathryn Polio Take 1 Tab by mouth daily. FLONASE 50 mcg/actuation nasal spray Generic drug:  fluticasone 2 Sprays by Both Nostrils route daily. fluticasone-salmeterol 250-50 mcg/dose diskus inhaler Commonly known as:  ADVAIR DISKUS Take 1 Puff by inhalation every twelve (12) hours. guaiFENesin-codeine 100-10 mg/5 mL solution Commonly known as:  ROBITUSSIN AC Take 5 mL by mouth three (3) times daily as needed for Cough. Max Daily Amount: 15 mL. levalbuterol 1.25 mg/3 mL Nebu Commonly known as:  XOPENEX  
3 mL by Nebulization route now for 1 dose. levoFLOXacin 750 mg tablet Commonly known as:  Bernice Boeck Take 1 Tab by mouth daily. levothyroxine 50 mcg tablet Commonly known as:  synthroid Take 1 Tab by mouth Daily (before breakfast). losartan-hydroCHLOROthiazide 100-25 mg per tablet Commonly known as:  HYZAAR Take 1 Tab by mouth daily. montelukast 10 mg tablet Commonly known as:  SINGULAIR Take 1 Tab by mouth daily. multivitamin tablet Commonly known as:  ONE A DAY Take 1 Tab by mouth daily. Omega-3-DHA-EPA-Fish Oil 1,000 mg (120 mg-180 mg) Cap Take  by mouth.  
  
 predniSONE 20 mg tablet Commonly known as:  [facility] Take 3 tabs daily x 3 days then 2 tabs daily x 3 days then 1 tab daily x 2 days, 1/2 tab daily x 2 days SUMAtriptan 100 mg tablet Commonly known as:  IMITREX Take once daily as needed for migraine VITAMIN C 500 mg tablet Generic drug:  ascorbic acid (vitamin C) Take  by mouth.  
  
 zolpidem 5 mg tablet Commonly known as:  AMBIEN Take 1 Tab by mouth nightly as needed for Sleep. Max Daily Amount: 5 mg.  Indications: SLEEP-ONSET INSOMNIA  
  
  
 Prescriptions Printed Refills  
 guaiFENesin-codeine (ROBITUSSIN AC) 100-10 mg/5 mL solution 0 Sig: Take 5 mL by mouth three (3) times daily as needed for Cough. Max Daily Amount: 15 mL. Class: Print Route: Oral  
  
Prescriptions Sent to Pharmacy Refills  
 predniSONE (DELTASONE) 20 mg tablet 0 Sig: Take 3 tabs daily x 3 days then 2 tabs daily x 3 days then 1 tab daily x 2 days, 1/2 tab daily x 2 days Class: Normal  
 Pharmacy: [address] Ph #: [phone]  
 levoFLOXacin (LEVAQUIN) 750 mg tablet 0 Sig: Take 1 Tab by mouth daily. Class: Normal  
 Pharmacy: [address] [place] [address] Ph #: [phone] Route: Oral  
  
We Performed the Following LEVALBUTEROL, INHAL. SOL., FDA-APPROVED FINAL, NON-COMPOUND UNIT DOSE, 0.5 MG [ [facility]] [place] INHAL RX, AIRWAY OBST/DX SPUTUM INDUCT [id number] CPT(R)] REFERRAL TO ALLERGY [REF5 Custom] Comments:  
 Please evaluate patient for persistent allergies Referral Information Referral ID Referred By Referred To  
  
 [id number] Donnie Capellan, [address] Phone: [phone] Fax: [phone] Visits Status Start Date End Date 1 New Request 8/22/17 8/22/18 If your referral has a status of pending review or denied, additional information will be sent to support the outcome of this decision. Patient Instructions Asthma in Adults: Care Instructions Your Care Instructions During an asthma attack, your airways swell and narrow as a reaction to certain things (triggers). This makes it hard to breathe. You may be able to prevent asthma attacks if you avoid the things that set off your asthma symptoms. Keeping your asthma under control and treating symptoms before they get bad can help you avoid severe attacks. If you can control your asthma, you may be able to do all of your normal daily activities. You may also avoid asthma attacks and trips to the hospital. 
Follow-up care is a key part of your treatment and safety. Be sure to make and go to all appointments, and call your doctor if you are having problems. It's also a good idea to know your test results and keep a list of the medicines you take. How can you care for yourself at home? · Follow your asthma action plan so you can manage your symptoms at home. An asthma action plan will help you prevent and control airway reactions and will tell you what to do during an asthma attack. If you do not have an asthma action plan, work with your doctor to build one. · Take your asthma medicine exactly as prescribed. Medicine plays an important role in controlling asthma. Talk to your doctor right away if you have any questions about what to take and how to take it. ¨ Use your quick-relief medicine when you have symptoms of an attack. Quick-relief medicine often is an albuterol inhaler. Some people need to use quick-relief medicine before they exercise. ¨ Take your controller medicine every day, not just when you have symptoms. Controller medicine is usually an inhaled corticosteroid. The goal is to prevent problems before they occur. Do not use your controller medicine to try to treat an attack that has already started. It does not work fast enough to help. ¨ If your doctor prescribed corticosteroid pills to use during an attack, take them as directed. They may take hours to work, but they may shorten the attack and help you breathe better. ¨ Keep your quick-relief medicine with you at all times. · Talk to your doctor before using other medicines. Some medicines, such as aspirin, can cause asthma attacks in some people. · Check yourself for asthma symptoms to know which step to follow in your action plan.  Watch for things like being short of breath, having chest tightness, coughing, and wheezing. Also notice if symptoms wake you up at night or if you get tired quickly when you exercise. · If you have a peak flow meter, use it to check how well you are breathing. This can help you predict when an asthma attack is going to occur. Then you can take medicine to prevent the asthma attack or make it less severe. · See your doctor regularly. These visits will help you learn more about asthma and what you can do to control it. Your doctor will monitor your treatment to make sure the medicine is helping you. · Keep track of your asthma attacks and your treatment. After you have had an attack, write down what triggered it, what helped end it, and any concerns you have about your asthma action plan. Take your diary when you see your doctor. You can then review your asthma action plan and decide if it is working. · Do not smoke or allow others to smoke around you. Avoid smoky places. Smoking makes asthma worse. If you need help quitting, talk to your doctor about stop-smoking programs and medicines. These can increase your chances of quitting for good. · Learn what triggers an asthma attack for you, and avoid the triggers when you can. Common triggers include colds, smoke, air pollution, dust, pollen, mold, pets, cockroaches, stress, and cold air. · Avoid colds and the flu. Get a pneumococcal vaccine shot. If you have had one before, ask your doctor whether you need a second dose. Get a flu vaccine every fall. If you must be around people with colds or the flu, wash your hands often. When should you call for help? Call 911 anytime you think you may need emergency care. For example, call if: 
· You have severe trouble breathing. Call your doctor now or seek immediate medical care if: 
· Your symptoms do not get better after you have followed your asthma action plan. · You cough up yellow, dark brown, or bloody mucus (sputum). Watch closely for changes in your health, and be sure to contact your doctor if: 
· Your coughing and wheezing get worse. · You need to use quick-relief medicine on more than 2 days a week (unless it is just for exercise). · You need help figuring out what is triggering your asthma attacks. Where can you learn more? Go to http://charan-kamala.info/. Enter P597 in the search box to learn more about \"Asthma in Adults: Care Instructions. \" Current as of: March 25, 2017 Content Version: 11.3 © 5934-8115 apomio. Care instructions adapted under license by [employer] (which disclaims liability or warranty for this information). If you have questions about a medical condition or this instruction, always ask your healthcare professional. [address] any warranty or liability for your use of this information. Bronchitis: Care Instructions Your Care Instructions Bronchitis is inflammation of the bronchial tubes, which carry air to the lungs. The tubes swell and produce mucus, or phlegm. The mucus and inflamed bronchial tubes make you cough. You may have trouble breathing. Most cases of bronchitis are caused by viruses like those that cause colds. Antibiotics usually do not help and they may be harmful. Bronchitis usually develops rapidly and lasts about 2 to 3 weeks in otherwise healthy people. Follow-up care is a key part of your treatment and safety. Be sure to make and go to all appointments, and call your doctor if you are having problems. It's also a good idea to know your test results and keep a list of the medicines you take. How can you care for yourself at home? · Take all medicines exactly as prescribed. Call your doctor if you think you are having a problem with your medicine.  
· Get some extra rest. 
· Take an over-the-counter pain medicine, such as acetaminophen (Tylenol), ibuprofen (Advil, Motrin), or naproxen (Aleve) to reduce fever and relieve body aches. Read and follow all instructions on the label. · Do not take two or more pain medicines at the same time unless the doctor told you to. Many pain medicines have acetaminophen, which is Tylenol. Too much acetaminophen (Tylenol) can be harmful. · Take an over-the-counter cough medicine that contains dextromethorphan to help quiet a dry, hacking cough so that you can sleep. Avoid cough medicines that have more than one active ingredient. Read and follow all instructions on the label. · Breathe moist air from a humidifier, hot shower, or sink filled with hot water. The heat and moisture will thin mucus so you can cough it out. · Do not smoke. Smoking can make bronchitis worse. If you need help quitting, talk to your doctor about stop-smoking programs and medicines. These can increase your chances of quitting for good. When should you call for help? Call 911 anytime you think you may need emergency care. For example, call if: 
· You have severe trouble breathing. Call your doctor now or seek immediate medical care if: 
· You have new or worse trouble breathing. · You cough up dark brown or bloody mucus (sputum). · You have a new or higher fever. · You have a new rash. Watch closely for changes in your health, and be sure to contact your doctor if: 
· You cough more deeply or more often, especially if you notice more mucus or a change in the color of your mucus. · You are not getting better as expected. Where can you learn more? Go to http://charan-kamala.info/. Enter H333 in the search box to learn more about \"Bronchitis: Care Instructions. \" Current as of: March 25, 2017 Content Version: 11.3 © 5198-6192 [employer].  Care instructions adapted under license by [employer] (which disclaims liability or warranty for this information). If you have questions about a medical condition or this instruction, always ask your healthcare professional. [address] any warranty or liability for your use of this information. Sinusitis: Care Instructions Your Care Instructions Sinusitis is an infection of the lining of the sinus cavities in your head. Sinusitis often follows a cold. It causes pain and pressure in your head and face. In most cases, sinusitis gets better on its own in 1 to 2 weeks. But some mild symptoms may last for several weeks. Sometimes antibiotics are needed. Follow-up care is a key part of your treatment and safety. Be sure to make and go to all appointments, and call your doctor if you are having problems. It's also a good idea to know your test results and keep a list of the medicines you take. How can you care for yourself at home? · Take an over-the-counter pain medicine, such as acetaminophen (Tylenol), ibuprofen (Advil, Motrin), or naproxen (Aleve). Read and follow all instructions on the label. · If the doctor prescribed antibiotics, take them as directed. Do not stop taking them just because you feel better. You need to take the full course of antibiotics. · Be careful when taking over-the-counter cold or flu medicines and Tylenol at the same time. Many of these medicines have acetaminophen, which is Tylenol. Read the labels to make sure that you are not taking more than the recommended dose. Too much acetaminophen (Tylenol) can be harmful. · Breathe warm, moist air from a steamy shower, a hot bath, or a sink filled with hot water. Avoid cold, dry air. Using a humidifier in your home may help. Follow the directions for cleaning the machine. · Use saline (saltwater) nasal washes to help keep your nasal passages open and wash out mucus and bacteria. You can buy saline nose drops at a grocery store or drugstore.  Or you can make your own at home by adding 1 teaspoon of salt and 1 teaspoon of baking soda to 2 cups of distilled water. If you make your own, fill a bulb syringe with the solution, insert the tip into your nostril, and squeeze gently. [place] [address] your nose. · Put a hot, wet towel or a warm gel pack on your face 3 or 4 times a day for 5 to 10 minutes each time. · Try a decongestant nasal spray like oxymetazoline (Afrin). Do not use it for more than 3 days in a row. Using it for more than 3 days can make your congestion worse. When should you call for help? Call your doctor now or seek immediate medical care if: 
· You have new or worse swelling or redness in your face or around your eyes. · You have a new or higher fever. Watch closely for changes in your health, and be sure to contact your doctor if: 
· You have new or worse facial pain. · The mucus from your nose becomes thicker (like pus) or has new blood in it. · You are not getting better as expected. Where can you learn more? Go to http://charan-kamala.info/. Enter N004 in the search box to learn more about \"Sinusitis: Care Instructions. \" Current as of: July 29, 2016 Content Version: 11.3 © 5198-8524 [employer]You. Care instructions adapted under license by [employer] (which disclaims liability or warranty for this information). If you have questions about a medical condition or this instruction, always ask your healthcare professional. [place] [address] any warranty or liability for your use of this information. Introducing [facility]! Dear Melanie Dad: 
Thank you for requesting a [employer] account. Our records indicate that you have previously registered for a ThriveOn account but its currently inactive. Please call our ThriveOn support line at [phone]. Additional Information If you have questions, please visit the Frequently Asked Questions section of the [employer] website at [email or web address] Remember, MyChart is NOT to be used for urgent needs. For medical emergencies, dial 911. Now available from your iPhone and Android! Please provide this summary of care documentation to your next provider. Your primary care clinician is listed as Jonh Lopez. If you have any questions after today's visit, please call [phone].

## 2017-08-22 NOTE — PATIENT INSTRUCTIONS
Asthma in Adults: Care Instructions  Your Care Instructions    During an asthma attack, your airways swell and narrow as a reaction to certain things (triggers). This makes it hard to breathe. You may be able to prevent asthma attacks if you avoid the things that set off your asthma symptoms. Keeping your asthma under control and treating symptoms before they get bad can help you avoid severe attacks. If you can control your asthma, you may be able to do all of your normal daily activities. You may also avoid asthma attacks and trips to the hospital.  Follow-up care is a key part of your treatment and safety. Be sure to make and go to all appointments, and call your doctor if you are having problems. It's also a good idea to know your test results and keep a list of the medicines you take. How can you care for yourself at home? · Follow your asthma action plan so you can manage your symptoms at home. An asthma action plan will help you prevent and control airway reactions and will tell you what to do during an asthma attack. If you do not have an asthma action plan, work with your doctor to build one. · Take your asthma medicine exactly as prescribed. Medicine plays an important role in controlling asthma. Talk to your doctor right away if you have any questions about what to take and how to take it. ¨ Use your quick-relief medicine when you have symptoms of an attack. Quick-relief medicine often is an albuterol inhaler. Some people need to use quick-relief medicine before they exercise. ¨ Take your controller medicine every day, not just when you have symptoms. Controller medicine is usually an inhaled corticosteroid. The goal is to prevent problems before they occur. Do not use your controller medicine to try to treat an attack that has already started. It does not work fast enough to help. ¨ If your doctor prescribed corticosteroid pills to use during an attack, take them as directed.  They may take hours to work, but they may shorten the attack and help you breathe better. ¨ Keep your quick-relief medicine with you at all times. · Talk to your doctor before using other medicines. Some medicines, such as aspirin, can cause asthma attacks in some people. · Check yourself for asthma symptoms to know which step to follow in your action plan. Watch for things like being short of breath, having chest tightness, coughing, and wheezing. Also notice if symptoms wake you up at night or if you get tired quickly when you exercise. · If you have a peak flow meter, use it to check how well you are breathing. This can help you predict when an asthma attack is going to occur. Then you can take medicine to prevent the asthma attack or make it less severe. · See your doctor regularly. These visits will help you learn more about asthma and what you can do to control it. Your doctor will monitor your treatment to make sure the medicine is helping you. · Keep track of your asthma attacks and your treatment. After you have had an attack, write down what triggered it, what helped end it, and any concerns you have about your asthma action plan. Take your diary when you see your doctor. You can then review your asthma action plan and decide if it is working. · Do not smoke or allow others to smoke around you. Avoid smoky places. Smoking makes asthma worse. If you need help quitting, talk to your doctor about stop-smoking programs and medicines. These can increase your chances of quitting for good. · Learn what triggers an asthma attack for you, and avoid the triggers when you can. Common triggers include colds, smoke, air pollution, dust, pollen, mold, pets, cockroaches, stress, and cold air. · Avoid colds and the flu. Get a pneumococcal vaccine shot. If you have had one before, ask your doctor whether you need a second dose. Get a flu vaccine every fall.  If you must be around people with colds or the flu, wash your hands often.  When should you call for help? Call 911 anytime you think you may need emergency care. For example, call if:  · You have severe trouble breathing. Call your doctor now or seek immediate medical care if:  · Your symptoms do not get better after you have followed your asthma action plan. · You cough up yellow, dark brown, or bloody mucus (sputum). Watch closely for changes in your health, and be sure to contact your doctor if:  · Your coughing and wheezing get worse. · You need to use quick-relief medicine on more than 2 days a week (unless it is just for exercise). · You need help figuring out what is triggering your asthma attacks. Where can you learn more? Go to http://charan-kamala.info/. Enter P597 in the search box to learn more about \"Asthma in Adults: Care Instructions. \"  Current as of: March 25, 2017  Content Version: 11.3  © 2143-6873 [facility]. Care instructions adapted under license by [employer] (which disclaims liability or warranty for this information). If you have questions about a medical condition or this instruction, always ask your healthcare professional. [place] [address] any warranty or liability for your use of this information. Bronchitis: Care Instructions  Your Care Instructions    Bronchitis is inflammation of the bronchial tubes, which carry air to the lungs. The tubes swell and produce mucus, or phlegm. The mucus and inflamed bronchial tubes make you cough. You may have trouble breathing. Most cases of bronchitis are caused by viruses like those that cause colds. Antibiotics usually do not help and they may be harmful. Bronchitis usually develops rapidly and lasts about 2 to 3 weeks in otherwise healthy people. Follow-up care is a key part of your treatment and safety. Be sure to make and go to all appointments, and call your doctor if you are having problems.  It's also a good idea to know your test results and keep a list of the medicines you take. How can you care for yourself at home? · Take all medicines exactly as prescribed. Call your doctor if you think you are having a problem with your medicine. · Get some extra rest.  · Take an over-the-counter pain medicine, such as acetaminophen (Tylenol), ibuprofen (Advil, Motrin), or naproxen (Aleve) to reduce fever and relieve body aches. Read and follow all instructions on the label. · Do not take two or more pain medicines at the same time unless the doctor told you to. Many pain medicines have acetaminophen, which is Tylenol. Too much acetaminophen (Tylenol) can be harmful. · Take an over-the-counter cough medicine that contains dextromethorphan to help quiet a dry, hacking cough so that you can sleep. Avoid cough medicines that have more than one active ingredient. Read and follow all instructions on the label. · Breathe moist air from a humidifier, hot shower, or sink filled with hot water. The heat and moisture will thin mucus so you can cough it out. · Do not smoke. Smoking can make bronchitis worse. If you need help quitting, talk to your doctor about stop-smoking programs and medicines. These can increase your chances of quitting for good. When should you call for help? Call 911 anytime you think you may need emergency care. For example, call if:  · You have severe trouble breathing. Call your doctor now or seek immediate medical care if:  · You have new or worse trouble breathing. · You cough up dark brown or bloody mucus (sputum). · You have a new or higher fever. · You have a new rash. Watch closely for changes in your health, and be sure to contact your doctor if:  · You cough more deeply or more often, especially if you notice more mucus or a change in the color of your mucus. · You are not getting better as expected. Where can you learn more? Go to http://charan-kamala.info/.   Enter H333 in the search box to learn more about \"Bronchitis: Care Instructions. \"  Current as of: March 25, 2017  Content Version: 11.3  © 1763-8270 [employer]. Care instructions adapted under license by [employer] (which disclaims liability or warranty for this information). If you have questions about a medical condition or this instruction, always ask your healthcare professional. [address] any warranty or liability for your use of this information. Sinusitis: Care Instructions  Your Care Instructions    Sinusitis is an infection of the lining of the sinus cavities in your head. Sinusitis often follows a cold. It causes pain and pressure in your head and face. In most cases, sinusitis gets better on its own in 1 to 2 weeks. But some mild symptoms may last for several weeks. Sometimes antibiotics are needed. Follow-up care is a key part of your treatment and safety. Be sure to make and go to all appointments, and call your doctor if you are having problems. It's also a good idea to know your test results and keep a list of the medicines you take. How can you care for yourself at home? · Take an over-the-counter pain medicine, such as acetaminophen (Tylenol), ibuprofen (Advil, Motrin), or naproxen (Aleve). Read and follow all instructions on the label. · If the doctor prescribed antibiotics, take them as directed. Do not stop taking them just because you feel better. You need to take the full course of antibiotics. · Be careful when taking over-the-counter cold or flu medicines and Tylenol at the same time. Many of these medicines have acetaminophen, which is Tylenol. Read the labels to make sure that you are not taking more than the recommended dose. Too much acetaminophen (Tylenol) can be harmful. · Breathe warm, moist air from a steamy shower, a hot bath, or a sink filled with hot water. Avoid cold, dry air. Using a humidifier in your home may help.  Follow the directions for cleaning the machine. · Use saline (saltwater) nasal washes to help keep your nasal passages open and wash out mucus and bacteria. You can buy saline nose drops at a grocery store or drugstore. Or you can make your own at home by adding 1 teaspoon of salt and 1 teaspoon of baking soda to 2 cups of distilled water. If you make your own, fill a bulb syringe with the solution, insert the tip into your nostril, and squeeze gently. Lizette Maite your nose. · Put a hot, wet towel or a warm gel pack on your face 3 or 4 times a day for 5 to 10 minutes each time. · Try a decongestant nasal spray like oxymetazoline (Afrin). Do not use it for more than 3 days in a row. Using it for more than 3 days can make your congestion worse. When should you call for help? Call your doctor now or seek immediate medical care if:  · You have new or worse swelling or redness in your face or around your eyes. · You have a new or higher fever. Watch closely for changes in your health, and be sure to contact your doctor if:  · You have new or worse facial pain. · The mucus from your nose becomes thicker (like pus) or has new blood in it. · You are not getting better as expected. Where can you learn more? Go to http://charan-kamala.info/. Enter O663 in the search box to learn more about \"Sinusitis: Care Instructions. \"  Current as of: July 29, 2016  Content Version: 11.3  © 6887-3857 [employer]. Care instructions adapted under license by [employer] (which disclaims liability or warranty for this information). If you have questions about a medical condition or this instruction, always ask your healthcare professional. [place] [address] any warranty or liability for your use of this information.

## 2017-08-22 NOTE — PROGRESS NOTES
HISTORY OF PRESENT ILLNESS  Antonia Dean is a 69 y.o. female. HPI  Presents with complaints of sinus pain, pressure, thick post nasal drainage, productive cough of thick yellow mucous, harsh cough with shortness of breath for the past 2 weeks. Initially felt like her allergies were flaring that this was aggravating her asthma. Went to [facility] after 1 week of symptoms and was given Amoxil 875 mg BID for sinusitis and Codeine cough syrup. Does not feel like medication made any difference and now she is more short of breath and congested in chest.  Denies fever, chills. Has been using her Advair inhaler on a regular basis and has been needing to use her Albuterol inhaler every 4-6 hours daily. Complains of fatigue and inability to sleep due to cough. Moved to [address] [place] [address] from [place] and has been having more problems with allergies and her asthma especially in the past several years. Review of Systems   Constitutional: Positive for malaise/fatigue. Negative for chills and fever. HENT: Positive for congestion and sore throat. Negative for hearing loss. Respiratory: Positive for cough, sputum production, shortness of breath and wheezing. Cardiovascular: Negative for chest pain and palpitations. Gastrointestinal: Negative for abdominal pain, nausea and vomiting. Musculoskeletal: Negative for myalgias. Skin: Negative for rash. Neurological: Positive for headaches. Negative for dizziness. /75 (BP 1 Location: Left arm, BP Patient Position: Sitting)  Pulse 87  Temp 98.8 °F (37.1 °C) (Oral)   Resp 14  Ht 5' 8\" (1.727 m)  Wt 176 lb 9.6 oz (80.1 kg)  LMP 03/15/1998  SpO2 96%  BMI 26.85 kg/m2  Physical Exam   Constitutional: She is oriented to person, place, and time. She appears well-developed and well-nourished. HENT:   Head: Normocephalic and atraumatic. Right Ear: External ear normal.   Left Ear: External ear normal.   Nose: Mucosal edema present.  Right sinus exhibits maxillary sinus tenderness and frontal sinus tenderness. Left sinus exhibits frontal sinus tenderness. Mouth/Throat: Posterior oropharyngeal erythema present. No posterior oropharyngeal edema. Neck: Normal range of motion. Neck supple. No thyromegaly present. Cardiovascular: Normal rate and regular rhythm. Pulmonary/Chest: Effort normal. She has wheezes. Coarse rhonchi and wheezing throughout   Lymphadenopathy:     She has cervical adenopathy. Neurological: She is alert and oriented to person, place, and time. Psychiatric: She has a normal mood and affect. Her behavior is normal.   Nursing note and vitals reviewed. ASSESSMENT and PLAN  Diagnoses and all orders for this visit:    1. Acute pansinusitis, recurrence not specified -- failure to improve with Amoxil  -     levoFLOXacin (LEVAQUIN) 750 mg tablet; Take 1 Tab by mouth daily. 2. Bronchitis  -     levoFLOXacin (LEVAQUIN) 750 mg tablet; Take 1 Tab by mouth daily. 3. Moderate persistent asthma with acute exacerbation -- Xopenex nebulizer treatment given in office with improvement of air exchange. -     LEVALBUTEROL, INHAL. SOL., FDA-APPROVED FINAL, NON-COMPOUND UNIT DOSE, 0.5 MG  -     levalbuterol (XOPENEX) 1.25 mg/3 mL nebu; 3 mL by Nebulization route now for 1 dose. -     MT INHAL RX, AIRWAY OBST/DX SPUTUM INDUCT  -     predniSONE (DELTASONE) 20 mg tablet; Take 3 tabs daily x 3 days then 2 tabs daily x 3 days then 1 tab daily x 2 days, 1/2 tab daily x 2 days  -     guaiFENesin-codeine (ROBITUSSIN AC) 100-10 mg/5 mL solution; Take 5 mL by mouth three (3) times daily as needed for Cough.  Max Daily Amount: 15 mL.  -     REFERRAL TO ALLERGY      lab results and schedule of future lab studies reviewed with patient  reviewed diet, exercise and weight control  reviewed medications and side effects in detail

## 2017-11-14 ENCOUNTER — OFFICE VISIT (OUTPATIENT)
Dept: INTERNAL MEDICINE CLINIC | Age: 67
End: 2017-11-14

## 2017-11-14 ENCOUNTER — TELEPHONE (OUTPATIENT)
Dept: INTERNAL MEDICINE CLINIC | Age: 67
End: 2017-11-14

## 2017-11-14 VITALS
TEMPERATURE: 97.9 F | HEART RATE: 80 BPM | BODY MASS INDEX: 26.98 KG/M2 | RESPIRATION RATE: 16 BRPM | SYSTOLIC BLOOD PRESSURE: 126 MMHG | DIASTOLIC BLOOD PRESSURE: 82 MMHG | WEIGHT: 178 LBS | HEIGHT: 68 IN | OXYGEN SATURATION: 97 %

## 2017-11-14 DIAGNOSIS — J01.10 ACUTE NON-RECURRENT FRONTAL SINUSITIS: Primary | ICD-10-CM

## 2017-11-14 DIAGNOSIS — G47.00 INSOMNIA, UNSPECIFIED TYPE: ICD-10-CM

## 2017-11-14 DIAGNOSIS — I10 ESSENTIAL HYPERTENSION: ICD-10-CM

## 2017-11-14 DIAGNOSIS — E78.2 MIXED HYPERLIPIDEMIA: ICD-10-CM

## 2017-11-14 DIAGNOSIS — E07.9 THYROID DISORDER: ICD-10-CM

## 2017-11-14 DIAGNOSIS — Z76.0 MEDICATION REFILL: ICD-10-CM

## 2017-11-14 DIAGNOSIS — J45.41 MODERATE PERSISTENT ASTHMA WITH ACUTE EXACERBATION: ICD-10-CM

## 2017-11-14 DIAGNOSIS — G43.809 OTHER MIGRAINE WITHOUT STATUS MIGRAINOSUS, NOT INTRACTABLE: ICD-10-CM

## 2017-11-14 DIAGNOSIS — F32.89 OTHER DEPRESSION: ICD-10-CM

## 2017-11-14 RX ORDER — ESCITALOPRAM OXALATE 20 MG/1
TABLET ORAL
Qty: 90 TAB | Refills: 2 | Status: SHIPPED | OUTPATIENT
Start: 2017-11-14 | End: 2018-01-17 | Stop reason: SDUPTHER

## 2017-11-14 RX ORDER — FLUTICASONE FUROATE AND VILANTEROL TRIFENATATE 200; 25 UG/1; UG/1
POWDER RESPIRATORY (INHALATION)
COMMUNITY
Start: 2017-10-05 | End: 2018-06-08 | Stop reason: SDUPTHER

## 2017-11-14 RX ORDER — ATORVASTATIN CALCIUM 40 MG/1
40 TABLET, FILM COATED ORAL
Qty: 90 TAB | Refills: 3 | Status: SHIPPED | OUTPATIENT
Start: 2017-11-14 | End: 2018-01-17 | Stop reason: SDUPTHER

## 2017-11-14 RX ORDER — MONTELUKAST SODIUM 10 MG/1
10 TABLET ORAL DAILY
Qty: 30 TAB | Refills: 5 | Status: SHIPPED | OUTPATIENT
Start: 2017-11-14 | End: 2018-01-17 | Stop reason: SDUPTHER

## 2017-11-14 RX ORDER — ZOLPIDEM TARTRATE 5 MG/1
5 TABLET ORAL
Qty: 30 TAB | Refills: 0 | Status: SHIPPED | OUTPATIENT
Start: 2017-11-14 | End: 2018-06-08 | Stop reason: SDUPTHER

## 2017-11-14 RX ORDER — LOSARTAN POTASSIUM AND HYDROCHLOROTHIAZIDE 25; 100 MG/1; MG/1
1 TABLET ORAL DAILY
Qty: 90 TAB | Refills: 3 | Status: SHIPPED | OUTPATIENT
Start: 2017-11-14 | End: 2018-01-17 | Stop reason: SDUPTHER

## 2017-11-14 RX ORDER — ALBUTEROL SULFATE 90 UG/1
2 AEROSOL, METERED RESPIRATORY (INHALATION)
Qty: 2 INHALER | Refills: 3 | Status: SHIPPED | OUTPATIENT
Start: 2017-11-14 | End: 2018-01-17 | Stop reason: SDUPTHER

## 2017-11-14 RX ORDER — PREDNISONE 20 MG/1
TABLET ORAL
Qty: 18 TAB | Refills: 0 | Status: SHIPPED | OUTPATIENT
Start: 2017-11-14 | End: 2018-01-17 | Stop reason: ALTCHOICE

## 2017-11-14 RX ORDER — AZELASTINE 1 MG/ML
SPRAY, METERED NASAL
Status: ON HOLD | COMMUNITY
Start: 2017-09-28 | End: 2019-10-04 | Stop reason: CLARIF

## 2017-11-14 RX ORDER — SUMATRIPTAN 100 MG/1
TABLET, FILM COATED ORAL
Qty: 12 TAB | Refills: 5 | Status: SHIPPED | OUTPATIENT
Start: 2017-11-14 | End: 2018-01-25 | Stop reason: SDUPTHER

## 2017-11-14 RX ORDER — LEVOFLOXACIN 500 MG/1
500 TABLET, FILM COATED ORAL DAILY
Qty: 12 TAB | Refills: 0 | Status: SHIPPED | OUTPATIENT
Start: 2017-11-14 | End: 2018-01-17 | Stop reason: ALTCHOICE

## 2017-11-14 NOTE — PROGRESS NOTES
Chief Complaint   Patient presents with    Nasal Congestion    Thyroid Problem     sinusitis  Presents with nasal blockage, post nasal drip and bilateral sinus pain for 3 weeks. Denies shortness of breath, chest pain, abdominal pain, nausea, vomiting,  sore throat, productive cough, itching in eyes and fever. Symptoms are severe. Recent treatment for similar symptoms? None. Has tried over-the-counter remedies alleve with mild and paritial relief of symptoms. Contacts with similar infections: yes. Asthma?:  yes. reports that she has never smoked. She has never used smokeless tobacco.    Thyroid Disease:  Christiana Leavitt is a 69 y.o. female here for follow up of Hashimoto's Disease. Lab Results   Component Value Date/Time    TSH 2.020 2017 10:29 AM     Residual symptoms denies fatigue, weight changes, heat/cold intolerance, bowel/skin changes or CVS symptoms. she denies denies fatigue, weight changes, heat/cold intolerance, bowel/skin changes or CVS symptoms  Thyroid medication has been unchanged since last medication check and labs. Subjective: Christiana Leavitt is a 69 y.o. female with hypertension. Hypertension ROS: taking medications as instructed, no medication side effects noted, no TIA's, no chest pain on exertion, no dyspnea on exertion, no swelling of ankles. New concerns: none.      Cholesterol  No se of statin    Depression  She increased her lexapro to 20 mg due to situational stress      Past Medical History:   Diagnosis Date    Asthma     Elevated lipids 9/15/2015    Essential hypertension 9/15/2015    Hypothyroidism, adult 9/15/2015    Migraine     allergy mediated in sping and fall    Sun-damaged skin      Past Surgical History:   Procedure Laterality Date    HX BREAST BIOPSY Left yrs ago    neg; surgical bx    HX  SECTION      HX CHOLECYSTECTOMY      HX CYST INCISION AND DRAINAGE Left over years    neg    HX TONSILLECTOMY       Social History     Social History  Marital status:      Spouse name: N/A    Number of children: N/A    Years of education: N/A     Social History Main Topics    Smoking status: Never Smoker    Smokeless tobacco: Never Used    Alcohol use 3.5 oz/week     7 Glasses of wine per week    Drug use: No    Sexual activity: Yes     Partners: Male     Other Topics Concern    None     Social History Narrative    Retired from [employer] in [place]    Stressful        Hx of situational stress     passed cancer duodenal    Mother was sick 79 yo with MS actually did well        Recently   In March 2017        1 son, doing well     Family History   Problem Relation Age of Onset   [employer] MS Mother     Cancer Mother      uterus/skin cancer    Cancer Father 72     colon    Parkinson's Disease Sister 61    MS Brother      Current Outpatient Prescriptions   Medication Sig Dispense Refill    BREO ELLIPTA 200-25 mcg/dose inhaler       azelastine (ASTELIN) 137 mcg (0.1 %) nasal spray       escitalopram oxalate (LEXAPRO) 20 mg tablet TAKE 1 TABLET DAILY 90 Tab 2    predniSONE (DELTASONE) 20 mg tablet Take 3 tabs daily x 3 days then 2 tabs daily x 3 days then 1 tab daily x 2 days, 1/2 tab daily x 2 days 18 Tab 0    zolpidem (AMBIEN) 5 mg tablet Take 1 Tab by mouth nightly as needed for Sleep. Max Daily Amount: 5 mg. Indications: SLEEP-ONSET INSOMNIA 30 Tab 0    losartan-hydroCHLOROthiazide (HYZAAR) 100-25 mg per tablet Take 1 Tab by mouth daily. 90 Tab 3    atorvastatin (LIPITOR) 40 mg tablet Take 1 Tab by mouth nightly. 90 Tab 3    montelukast (SINGULAIR) 10 mg tablet Take 1 Tab by mouth daily. 30 Tab 5    levoFLOXacin (LEVAQUIN) 500 mg tablet Take 1 Tab by mouth daily. 12 Tab 0    SUMAtriptan (IMITREX) 100 mg tablet Take once daily as needed for migraine 12 Tab 5    albuterol (PROAIR HFA) 90 mcg/actuation inhaler Take 2 Puffs by inhalation every four (4) hours as needed for Wheezing or Shortness of Breath.  2 Inhaler 3    levothyroxine (SYNTHROID) 50 mcg tablet Take 1 Tab by mouth Daily (before breakfast). 90 Tab 1    Omega-3-DHA-EPA-Fish Oil 1,000 mg (120 mg-180 mg) cap Take  by mouth.  multivitamin (ONE A DAY) tablet Take 1 Tab by mouth daily.  ascorbic acid (VITAMIN C) 500 mg tablet Take  by mouth.  fluticasone (FLONASE) 50 mcg/actuation nasal spray 2 Sprays by Both Nostrils route daily. Allergies   Allergen Reactions    Sulfa (Sulfonamide Antibiotics) Anaphylaxis       Review of Systems - General ROS: positive for  - fatigue, malaise and sleep disturbance  negative for - chills or fever  Cardiovascular ROS: no chest pain or dyspnea on exertion  Respiratory ROS: positive for - cough  negative for - sputum changes, stridor or wheezing    Visit Vitals    /82 (BP 1 Location: Left arm, BP Patient Position: Sitting)    Pulse 80    Temp 97.9 °F (36.6 °C) (Oral)    Resp 16    Ht 5' 8\" (1.727 m)    Wt 178 lb (80.7 kg)    LMP 03/15/1998    SpO2 97%    BMI 27.06 kg/m2     General Appearance:  Well developed, well nourished,alert and oriented x 3, and individual in no acute distress. Ears/Nose/Mouth/Throat:   Hearing grossly normal.left frontal and maxillary tenderness on palpation         Neck: Supple, no lad, no bruits   Chest:   Lungs clear to auscultation bilaterally. Cardiovascular:  Regular rate and rhythm, S1, S2 normal, no murmur. Abdomen:   Soft, non-tender, bowel sounds are active. Extremities: No edema bilaterally. Skin: Warm and dry, no suspicious lesions                 Diagnoses and all orders for this visit:    1. Thyroid disorder  Pt reports increase in fatigue prior to illness from sinuses  -     TSH 3RD GENERATION; Future  -     T4, FREE; Future    2. Moderate persistent asthma with acute exacerbation  -     albuterol (PROAIR HFA) 90 mcg/actuation inhaler; Take 2 Puffs by inhalation every four (4) hours as needed for Wheezing or Shortness of Breath.   -     montelukast (SINGULAIR) 10 mg tablet; Take 1 Tab by mouth daily. 3. Insomnia, unspecified type  Discussed with pt does not use every night. She is using prn when stressed  -     zolpidem (AMBIEN) 5 mg tablet; Take 1 Tab by mouth nightly as needed for Sleep. Max Daily Amount: 5 mg. Indications: SLEEP-ONSET INSOMNIA    4. Medication refill  HTN  stable  cotn meds  -     losartan-hydroCHLOROthiazide (HYZAAR) 100-25 mg per tablet; Take 1 Tab by mouth daily. 5. Other migraine without status migrainosus, not intractable  -     SUMAtriptan (IMITREX) 100 mg tablet; Take once daily as needed for migraine    6. Mixed hyperlipidemia  -     LIPID PANEL; Future  -     METABOLIC PANEL, COMPREHENSIVE; Future  -     atorvastatin (LIPITOR) 40 mg tablet; Take 1 Tab by mouth nightly. Depression   stable  -     escitalopram oxalate (LEXAPRO) 20 mg tablet; TAKE 1 TABLET DAILY    sinustis  -     levoFLOXacin (LEVAQUIN) 500 mg tablet; Take 1 Tab by mouth daily. This note will not be viewable in [address]

## 2017-11-14 NOTE — MR AVS SNAPSHOT
Visit Information Date & Time Provider Department Dept. Phone Encounter #  
 11/14/2017 12:00 PM Ra Manley MD [facility] [address] [id number] Upcoming Health Maintenance Date Due  
 GLAUCOMA SCREENING Q2Y 8/1/2017 Influenza Age 5 to Adult 8/1/2017 FOBT Q 1 YEAR AGE 50-75 9/27/2017 MEDICARE YEARLY EXAM 4/27/2018 BREAST CANCER SCRN MAMMOGRAM 5/23/2019 DTaP/Tdap/Td series (2 - Td) 9/27/2026 Allergies as of 11/14/2017  Review Complete On: 11/14/2017 By: Zahra Felipe LPN Severity Noted Reaction Type Reactions Sulfa (Sulfonamide Antibiotics) High 03/15/2015    Anaphylaxis Current Immunizations  Reviewed on 1/31/2017 Name Date Influenza Vaccine 9/15/2015 Pneumococcal Conjugate (PCV-13) 10/4/2016 Not reviewed this visit You Were Diagnosed With   
  
 Codes Comments Thyroid disorder    -  Primary ICD-10-CM: E07.9 ICD-9-CM: 246.9 Moderate persistent asthma with acute exacerbation     ICD-10-CM: J45.41 
ICD-9-CM: 493.92 Insomnia, unspecified type     ICD-10-CM: G47.00 ICD-9-CM: 780.52 Medication refill     ICD-10-CM: Z76.0 ICD-9-CM: V68.1 Other migraine without status migrainosus, not intractable     ICD-10-CM: S24.412 ICD-9-CM: 346.80 Mixed hyperlipidemia     ICD-10-CM: E78.2 ICD-9-CM: 272.2 Vitals BP Pulse Temp Resp Height(growth percentile) Weight(growth percentile) 126/82 (BP 1 Location: Left arm, BP Patient Position: Sitting) 80 97.9 °F (36.6 °C) (Oral) 16 5' 8\" (1.727 m) 178 lb (80.7 kg) LMP SpO2 BMI OB Status Smoking Status 03/15/1998 97% 27.06 kg/m2 Postmenopausal Never Smoker Vitals History BMI and BSA Data Body Mass Index Body Surface Area  
 27.06 kg/m 2 1.97 m 2 Preferred Pharmacy Pharmacy Name Phone [facility] [address], [place] [address] [facility] [phone] Your Updated Medication List  
  
 This list is accurate as of: 11/14/17 12:38 PM.  Always use your most recent med list.  
  
  
  
  
 albuterol 90 mcg/actuation inhaler Commonly known as:  PROAIR HFA Take 2 Puffs by inhalation every four (4) hours as needed for Wheezing or Shortness of Breath. atorvastatin 40 mg tablet Commonly known as:  LIPITOR Take 1 Tab by mouth nightly. azelastine 137 mcg (0.1 %) nasal spray Commonly known as:  ASTELIN  
  
 BREO ELLIPTA 200-25 mcg/dose inhaler Generic drug:  fluticasone-vilanterol  
  
 escitalopram oxalate 20 mg tablet Commonly known as:  Pollyann Candler TAKE 1 TABLET DAILY  
  
 FLONASE 50 mcg/actuation nasal spray Generic drug:  fluticasone 2 Sprays by Both Nostrils route daily. levoFLOXacin 500 mg tablet Commonly known as:  Oliverio Hill Take 1 Tab by mouth daily. levothyroxine 50 mcg tablet Commonly known as:  synthroid Take 1 Tab by mouth Daily (before breakfast). losartan-hydroCHLOROthiazide 100-25 mg per tablet Commonly known as:  HYZAAR Take 1 Tab by mouth daily. montelukast 10 mg tablet Commonly known as:  SINGULAIR Take 1 Tab by mouth daily. multivitamin tablet Commonly known as:  ONE A DAY Take 1 Tab by mouth daily. Omega-3-DHA-EPA-Fish Oil 1,000 mg (120 mg-180 mg) Cap Take  by mouth.  
  
 predniSONE 20 mg tablet Commonly known as:  Alinda Rowan Take 3 tabs daily x 3 days then 2 tabs daily x 3 days then 1 tab daily x 2 days, 1/2 tab daily x 2 days SUMAtriptan 100 mg tablet Commonly known as:  IMITREX Take once daily as needed for migraine VITAMIN C 500 mg tablet Generic drug:  ascorbic acid (vitamin C) Take  by mouth.  
  
 zolpidem 5 mg tablet Commonly known as:  AMBIEN Take 1 Tab by mouth nightly as needed for Sleep. Max Daily Amount: 5 mg. Indications: SLEEP-ONSET INSOMNIA Prescriptions Printed  Refills  
 zolpidem (AMBIEN) 5 mg tablet 0  
 Sig: Take 1 Tab by mouth nightly as needed for Sleep. Max Daily Amount: 5 mg. Indications: SLEEP-ONSET INSOMNIA Class: Print Route: Oral  
  
Prescriptions Sent to Pharmacy Refills  
 escitalopram oxalate (LEXAPRO) 20 mg tablet 2 Sig: TAKE 1 TABLET DAILY Class: Normal  
 Pharmacy: [address] Ph #: [phone]  
 predniSONE (DELTASONE) 20 mg tablet 0 Sig: Take 3 tabs daily x 3 days then 2 tabs daily x 3 days then 1 tab daily x 2 days, 1/2 tab daily x 2 days Class: Normal  
 Pharmacy: [address] Ph #: [phone]  
 losartan-hydroCHLOROthiazide (HYZAAR) 100-25 mg per tablet 3 Sig: Take 1 Tab by mouth daily. Class: Normal  
 Pharmacy: [address] Ph #: [phone] Route: Oral  
 atorvastatin (LIPITOR) 40 mg tablet 3 Sig: Take 1 Tab by mouth nightly. Class: Normal  
 Pharmacy: [address] Ph #: [phone] Route: Oral  
 montelukast (SINGULAIR) 10 mg tablet 5 Sig: Take 1 Tab by mouth daily. Class: Normal  
 Pharmacy: [address] Ph #: [phone] Route: Oral  
 levoFLOXacin (LEVAQUIN) 500 mg tablet 0 Sig: Take 1 Tab by mouth daily. Class: Normal  
 Pharmacy: [address] Ph #: [phone] Route: Oral  
 SUMAtriptan (IMITREX) 100 mg tablet 5 Sig: Take once daily as needed for migraine Class: Normal  
 Pharmacy[facility] Ph #: [phone]  
 albuterol (PROAIR HFA) 90 mcg/actuation inhaler 3 Sig: Take 2 Puffs by inhalation every four (4) hours as needed for Wheezing or Shortness of Breath.   
 Class: Normal  
 Pharmacy: [facility] [address], [place] Ph #: [phone] Route: Inhalation To-Do List   
 11/14/2017 Lab:  LIPID PANEL   
  
 11/14/2017 Lab:  METABOLIC PANEL, COMPREHENSIVE   
  
 11/14/2017 Lab:  T4, FREE   
  
 11/14/2017 Lab:  TSH 3RD GENERATION Introducing [facility] HE[facility] SERVICES! Dear Buck Him: 
Thank you for requesting a [employer] account. Our records indicate that you have previously registered for a [employer] account but its currently inactive. Please call our [employer] support line at [phone]. Additional Information If you have questions, please visit the Frequently Asked Questions section of the [employer] website at [email or web address] Remember, [employer] is NOT to be used for urgent needs. For medical emergencies, dial 911. Now available from your iPhone and Android! Please provide this summary of care documentation to your next provider. Your primary care clinician is listed as Leatha Dockery. If you have any questions after today's visit, please call [phone].

## 2017-11-14 NOTE — TELEPHONE ENCOUNTER
Patient has been dealing with a sinus infection and it is just getting worse.   She would like to come in today to see Dr. Pelaez Me or have a nurse call her back

## 2018-01-16 ENCOUNTER — HOSPITAL ENCOUNTER (OUTPATIENT)
Dept: LAB | Age: 68
Discharge: HOME | End: 2018-01-16
Payer: MEDICARE

## 2018-01-16 PROCEDURE — 84443 ASSAY THYROID STIM HORMONE: CPT

## 2018-01-16 PROCEDURE — 36415 COLL VENOUS BLD VENIPUNCTURE: CPT

## 2018-01-16 PROCEDURE — 84439 ASSAY OF FREE THYROXINE: CPT

## 2018-01-17 ENCOUNTER — OFFICE VISIT (OUTPATIENT)
Dept: INTERNAL MEDICINE CLINIC | Age: 68
End: 2018-01-17

## 2018-01-17 ENCOUNTER — HOSPITAL ENCOUNTER (OUTPATIENT)
Dept: LAB | Age: 68
Discharge: HOME | End: 2018-01-17
Payer: MEDICARE

## 2018-01-17 VITALS
WEIGHT: 178 LBS | OXYGEN SATURATION: 97 % | DIASTOLIC BLOOD PRESSURE: 80 MMHG | RESPIRATION RATE: 18 BRPM | HEIGHT: 68 IN | TEMPERATURE: 98.3 F | SYSTOLIC BLOOD PRESSURE: 133 MMHG | HEART RATE: 82 BPM | BODY MASS INDEX: 26.98 KG/M2

## 2018-01-17 DIAGNOSIS — R20.2 PARESTHESIA: Primary | ICD-10-CM

## 2018-01-17 DIAGNOSIS — F32.89 OTHER DEPRESSION: ICD-10-CM

## 2018-01-17 DIAGNOSIS — J45.41 MODERATE PERSISTENT ASTHMA WITH ACUTE EXACERBATION: ICD-10-CM

## 2018-01-17 DIAGNOSIS — J01.10 ACUTE NON-RECURRENT FRONTAL SINUSITIS: ICD-10-CM

## 2018-01-17 DIAGNOSIS — E78.2 MIXED HYPERLIPIDEMIA: ICD-10-CM

## 2018-01-17 DIAGNOSIS — E03.9 HYPOTHYROIDISM, ADULT: ICD-10-CM

## 2018-01-17 DIAGNOSIS — I10 ESSENTIAL HYPERTENSION: ICD-10-CM

## 2018-01-17 LAB
T4 FREE SERPL-MCNC: 1.37 NG/DL (ref 0.82–1.77)
TSH SERPL DL<=0.005 MIU/L-ACNC: 2.81 UIU/ML (ref 0.45–4.5)

## 2018-01-17 PROCEDURE — 36415 COLL VENOUS BLD VENIPUNCTURE: CPT

## 2018-01-17 PROCEDURE — 82607 VITAMIN B-12: CPT

## 2018-01-17 PROCEDURE — 80053 COMPREHEN METABOLIC PANEL: CPT

## 2018-01-17 PROCEDURE — 84443 ASSAY THYROID STIM HORMONE: CPT

## 2018-01-17 PROCEDURE — 82306 VITAMIN D 25 HYDROXY: CPT

## 2018-01-17 RX ORDER — LOSARTAN POTASSIUM AND HYDROCHLOROTHIAZIDE 25; 100 MG/1; MG/1
1 TABLET ORAL DAILY
Qty: 90 TAB | Refills: 3 | Status: SHIPPED | COMMUNITY
Start: 2018-01-17 | End: 2018-06-08 | Stop reason: SDUPTHER

## 2018-01-17 RX ORDER — ESCITALOPRAM OXALATE 10 MG/1
TABLET ORAL
Qty: 90 TAB | Refills: 3 | Status: SHIPPED | COMMUNITY
Start: 2018-01-17 | End: 2018-01-30 | Stop reason: SDUPTHER

## 2018-01-17 RX ORDER — ALBUTEROL SULFATE 90 UG/1
2 AEROSOL, METERED RESPIRATORY (INHALATION)
Qty: 2 INHALER | Refills: 3 | Status: SHIPPED | COMMUNITY
Start: 2018-01-17 | End: 2018-06-08 | Stop reason: SDUPTHER

## 2018-01-17 RX ORDER — ATORVASTATIN CALCIUM 40 MG/1
40 TABLET, FILM COATED ORAL
Qty: 90 TAB | Refills: 3 | Status: SHIPPED | COMMUNITY
Start: 2018-01-17 | End: 2018-06-08 | Stop reason: SDUPTHER

## 2018-01-17 RX ORDER — GABAPENTIN 100 MG/1
CAPSULE ORAL
Qty: 120 CAP | Refills: 0 | Status: SHIPPED | OUTPATIENT
Start: 2018-01-17 | End: 2018-01-30 | Stop reason: DRUGHIGH

## 2018-01-17 RX ORDER — MONTELUKAST SODIUM 10 MG/1
10 TABLET ORAL DAILY
Qty: 90 TAB | Refills: 3 | Status: SHIPPED | COMMUNITY
Start: 2018-01-17 | End: 2018-06-08 | Stop reason: SDUPTHER

## 2018-01-17 RX ORDER — LEVOTHYROXINE SODIUM 50 UG/1
50 TABLET ORAL
Qty: 90 TAB | Refills: 3 | Status: SHIPPED | OUTPATIENT
Start: 2018-01-17 | End: 2018-06-08 | Stop reason: SDUPTHER

## 2018-01-17 NOTE — MR AVS SNAPSHOT
[address] [place] [address] 
 
 
 [address] 
[phone] Patient: Merline Chuck MRN: [id number] QQF:[id number] Visit Information Date & Time Provider Department Dept. Phone Encounter #  
 1/17/2018 12:40 PM Fidencio Pulido MD [facility] [address] [facility] [id number] Upcoming Health Maintenance Date Due  
 GLAUCOMA SCREENING Q2Y 8/1/2017 Influenza Age 5 to Adult 8/1/2017 FOBT Q 1 YEAR AGE 50-75 9/27/2017 MEDICARE YEARLY EXAM 4/27/2018 BREAST CANCER SCRN MAMMOGRAM 5/23/2019 DTaP/Tdap/Td series (2 - Td) 9/27/2026 Allergies as of 1/17/2018  Review Complete On: 1/17/2018 By: Fidencio Pulido MD  
  
 Severity Noted Reaction Type Reactions Sulfa (Sulfonamide Antibiotics) High 03/15/2015    Anaphylaxis Current Immunizations  Reviewed on 1/31/2017 Name Date Influenza Vaccine 9/15/2015 Pneumococcal Conjugate (PCV-13) 10/4/2016 Not reviewed this visit You Were Diagnosed With   
  
 Codes Comments Paresthesia    -  Primary ICD-10-CM: R20.2 ICD-9-CM: 782.0 Essential hypertension     ICD-10-CM: I10 
ICD-9-CM: 401.9 Other depression     ICD-10-CM: F32.89 ICD-9-CM: 743 Mixed hyperlipidemia     ICD-10-CM: E78.2 ICD-9-CM: 272.2 Acute non-recurrent frontal sinusitis     ICD-10-CM: J01.10 ICD-9-CM: 896.8 Moderate persistent asthma with acute exacerbation     ICD-10-CM: J45.41 
ICD-9-CM: 493.92 Hypothyroidism, adult     ICD-10-CM: E03.9 ICD-9-CM: 447. 9 Vitals BP Pulse Temp Resp Height(growth percentile) Weight(growth percentile) 133/80 (BP 1 Location: Left arm, BP Patient Position: Sitting) 82 98.3 °F (36.8 °C) (Oral) 18 5' 8\" (1.727 m) 178 lb (80.7 kg) LMP SpO2 BMI OB Status Smoking Status 03/15/1998 97% 27.06 kg/m2 Postmenopausal Never Smoker BMI and BSA Data  Body Mass Index Body Surface Area  
 27.06 kg/m 2 1.97 m 2  
 Preferred Pharmacy Pharmacy Name Phone [address] [phone] Your Updated Medication List  
  
   
This list is accurate as of: 1/17/18  1:34 PM.  Always use your most recent med list.  
  
  
  
  
 albuterol 90 mcg/actuation inhaler Commonly known as:  PROAIR HFA Take 2 Puffs by inhalation every four (4) hours as needed for Wheezing or Shortness of Breath. atorvastatin 40 mg tablet Commonly known as:  LIPITOR Take 1 Tab by mouth nightly. azelastine 137 mcg (0.1 %) nasal spray Commonly known as:  ASTELIN  
  
 BREO ELLIPTA 200-25 mcg/dose inhaler Generic drug:  fluticasone-vilanterol  
  
 escitalopram oxalate 10 mg tablet Commonly known as:  Marta Ac TAKE 1 TABLET DAILY  
  
 FLONASE 50 mcg/actuation nasal spray Generic drug:  fluticasone 2 Sprays by Both Nostrils route daily. gabapentin 100 mg capsule Commonly known as:  NEURONTIN Take 1 po at night for 3 nights then take 2 po at night for 3 nights then take 3 po at night for 3 nights then take 4 po at night for 3 nights.  
  
 levothyroxine 50 mcg tablet Commonly known as:  synthroid Take 1 Tab by mouth Daily (before breakfast). losartan-hydroCHLOROthiazide 100-25 mg per tablet Commonly known as:  HYZAAR Take 1 Tab by mouth daily. montelukast 10 mg tablet Commonly known as:  SINGULAIR Take 1 Tab by mouth daily. multivitamin tablet Commonly known as:  ONE A DAY Take 1 Tab by mouth daily. Omega-3-DHA-EPA-Fish Oil 1,000 mg (120 mg-180 mg) Cap Take  by mouth. SUMAtriptan 100 mg tablet Commonly known as:  IMITREX Take once daily as needed for migraine VITAMIN C 500 mg tablet Generic drug:  ascorbic acid (vitamin C) Take  by mouth.  
  
 zolpidem 5 mg tablet Commonly known as:  AMBIEN Take 1 Tab by mouth nightly as needed for Sleep. Max Daily Amount: 5 mg. Indications: SLEEP-ONSET INSOMNIA Prescriptions Printed Refills  
 levothyroxine (SYNTHROID) 50 mcg tablet 3 Sig: Take 1 Tab by mouth Daily (before breakfast). Class: Print Route: Oral  
 gabapentin (NEURONTIN) 100 mg capsule 0 Sig: Take 1 po at night for 3 nights then take 2 po at night for 3 nights then take 3 po at night for 3 nights then take 4 po at night for 3 nights. Class: Print Prescriptions Sent to Mail Order Refills  
 escitalopram oxalate (LEXAPRO) 10 mg tablet 3 Sig: TAKE 1 TABLET DAILY Class: Mail Order Pharmacy: [address], [address] Ph #: [phone]  
 losartan-hydroCHLOROthiazide (HYZAAR) 100-25 mg per tablet 3 Sig: Take 1 Tab by mouth daily. Class: Mail Order Pharmacy: [address], [address] Ph #: [phone] Route: Oral  
 atorvastatin (LIPITOR) 40 mg tablet 3 Sig: Take 1 Tab by mouth nightly. Class: Mail Order Pharmacy: [address], [address] Ph #: [phone] Route: Oral  
 montelukast (SINGULAIR) 10 mg tablet 3 Sig: Take 1 Tab by mouth daily. Class: Mail Order Pharmacy: [address], [address] Ph #: [phone] Route: Oral  
 albuterol (PROAIR HFA) 90 mcg/actuation inhaler 3 Sig: Take 2 Puffs by inhalation every four (4) hours as needed for Wheezing or Shortness of Breath. Class: Mail Order Pharmacy: [address], [address] Ph #: [phone] Route: Inhalation We Performed the Following METABOLIC PANEL, COMPREHENSIVE [98484 CPT(R)] SED RATE (ESR) [id number] CPT(R)] VITAMIN B12 & FOLATE [86883 CPT(R)] VITAMIN D, 25 HYDROXY [id number] CPT(R)] To-Do List   
 01/17/2018 Imaging:  [facility]! Dear Maxime Pietro: Thank you for requesting a [employer] account. Our records indicate that you have previously registered for a [employer] account but its currently inactive. Please call our [employer] support line at [phone]. Additional Information If you have questions, please visit the Frequently Asked Questions section of the [employer] website at [email or web address] Remember, [employer] is NOT to be used for urgent needs. For medical emergencies, dial 911. Now available from your iPhone and Android! Please provide this summary of care documentation to your next provider. Your primary care clinician is listed as Birda Stage. If you have any questions after today's visit, please call [phone].

## 2018-01-17 NOTE — PROGRESS NOTES
Chief Complaint   Patient presents with    Medication Evaluation     electrical paresthesia  Pt notes sx for >2 years. She reports sx were initially in the evening but over time has progressed to the day. She notes there seems a trgger spot in low back which is palpable. It causes an electrical zing/sensation with quesiness radiates up to left side of scapula and up to left trapezius and then into left occiput. No fevers, no night sweats, no other neurological sx. No knumbness or tingling anywhere else. Walks a mile with her dog without problems. She has a family hx of MS. She does not think MS but concerned about neurological nature. She was given ambien up in [place] to allow her to sleep because her work up was negative. She is still taking her ambien to allow her to sleep and has restful sleep but sx are there still during the day. Her sx are significant and interfering with qol during the day. When bad she can not do anything but go to sleep. Thyroid Disease:  Bertha Melgar is a 69 y.o. female here for follow up of Hashimoto's Disease. Lab Results   Component Value Date/Time    TSH 2.810 01/16/2018 02:14 PM     Residual symptoms denies fatigue, weight changes, heat/cold intolerance, bowel/skin changes or CVS symptoms. she denies denies fatigue, weight changes, heat/cold intolerance, bowel/skin changes or CVS symptoms  Thyroid medication has been unchanged since last medication check and labs. Subjective: Bertha Melgar is a 69 y.o. female with hypertension. Hypertension ROS: taking medications as instructed, no medication side effects noted, no TIA's, no chest pain on exertion, no dyspnea on exertion, no swelling of ankles. New concerns: none. Cholesterol  No se of statin    Depression  She increased her lexapro to 20 mg due to situational stress. Of note pt decreased to 10 mg 3 weeks ago and sx came on 2 weeks.        Not a smoker  etoh will drink wine or elayne whiskey  2 glasses of wine OR a shot of [employer]    Past Medical History:   Diagnosis Date    Asthma     Elevated lipids 9/15/2015    Essential hypertension 9/15/2015    Hypothyroidism, adult 9/15/2015    Migraine     allergy mediated in sping and fall    Sun-damaged skin      Past Surgical History:   Procedure Laterality Date    HX BREAST BIOPSY Left yrs ago    neg; surgical bx    HX  SECTION      HX CHOLECYSTECTOMY      HX CYST INCISION AND DRAINAGE Left over years    neg    HX TONSILLECTOMY       Social History     Social History    Marital status:      Spouse name: N/A    Number of children: N/A    Years of education: N/A     Social History Main Topics    Smoking status: Never Smoker    Smokeless tobacco: Never Used    Alcohol use 3.5 oz/week     7 Glasses of wine per week    Drug use: No    Sexual activity: Yes     Partners: Male     Other Topics Concern    None     Social History Narrative    Retired from [employer] in [place]    Stressful        Hx of situational stress     passed cancer duodenal    Mother was sick 81 yo with MS actually did well        Recently   In 2017        1 son, doing well     Family History   Problem Relation Age of Onset   [address] [facility] MS Mother     Cancer Mother      uterus/skin cancer    Cancer Father 72     colon    Parkinson's Disease Sister 61    MS Brother      Current Outpatient Prescriptions   Medication Sig Dispense Refill    BREO ELLIPTA 200-25 mcg/dose inhaler       escitalopram oxalate (LEXAPRO) 20 mg tablet TAKE 1 TABLET DAILY 90 Tab 2    zolpidem (AMBIEN) 5 mg tablet Take 1 Tab by mouth nightly as needed for Sleep. Max Daily Amount: 5 mg. Indications: SLEEP-ONSET INSOMNIA 30 Tab 0    losartan-hydroCHLOROthiazide (HYZAAR) 100-25 mg per tablet Take 1 Tab by mouth daily. 90 Tab 3    atorvastatin (LIPITOR) 40 mg tablet Take 1 Tab by mouth nightly. 90 Tab 3    montelukast (SINGULAIR) 10 mg tablet Take 1 Tab by mouth daily.  30 Tab 5    SUMAtriptan (IMITREX) 100 mg tablet Take once daily as needed for migraine 12 Tab 5    levothyroxine (SYNTHROID) 50 mcg tablet Take 1 Tab by mouth Daily (before breakfast). 90 Tab 1    Omega-3-DHA-EPA-Fish Oil 1,000 mg (120 mg-180 mg) cap Take  by mouth.  multivitamin (ONE A DAY) tablet Take 1 Tab by mouth daily.  ascorbic acid (VITAMIN C) 500 mg tablet Take  by mouth.  azelastine (ASTELIN) 137 mcg (0.1 %) nasal spray       albuterol (PROAIR HFA) 90 mcg/actuation inhaler Take 2 Puffs by inhalation every four (4) hours as needed for Wheezing or Shortness of Breath. 2 Inhaler 3    fluticasone (FLONASE) 50 mcg/actuation nasal spray 2 Sprays by Both Nostrils route daily. Allergies   Allergen Reactions    Sulfa (Sulfonamide Antibiotics) Anaphylaxis       Review of Systems - General ROS: positive for  - fatigue, malaise and sleep disturbance  negative for - chills or fever  Cardiovascular ROS: no chest pain or dyspnea on exertion  Respiratory ROS: positive for - cough  negative for - sputum changes, stridor or wheezing    Visit Vitals    /80 (BP 1 Location: Left arm, BP Patient Position: Sitting)    Pulse 82    Temp 98.3 °F (36.8 °C) (Oral)    Resp 18    Ht 5' 8\" (1.727 m)    Wt 178 lb (80.7 kg)    LMP 03/15/1998    SpO2 97%    BMI 27.06 kg/m2     General Appearance:  Well developed, well nourished,alert and oriented x 3, and individual in no acute distress. Ears/Nose/Mouth/Throat:   Hearing grossly normal.left frontal and maxillary tenderness on palpation         Neck: Supple, no lad, no bruits   Chest:   Lungs clear to auscultation bilaterally. Cardiovascular:  Regular rate and rhythm, S1, S2 normal, no murmur. Abdomen:   Soft, non-tender, bowel sounds are active. Extremities: No edema bilaterally. Skin: Warm and dry, no suspicious lesions                 Diagnoses and all orders for this visit:    1.  Paresthesia  Not improving and progressive  Clearly palpable area which triggers sx with radiation  Will do US and have her see neurology if needed  -     VITAMIN B12 & FOLATE  -     METABOLIC PANEL, COMPREHENSIVE  -     VITAMIN D, 25 HYDROXY  -     SED RATE (ESR)  -     US ABD LTD; Future  I don't know if these sx are manifesting because she decrased her lexapro to 10 mg discussed with pt    2. Essential hypertension  Cont meds  -     losartan-hydroCHLOROthiazide (HYZAAR) 100-25 mg per tablet; Take 1 Tab by mouth daily. 3. Other depression  -     escitalopram oxalate (LEXAPRO) 10 mg tablet; TAKE 1 TABLET DAILY    4. Mixed hyperlipidemia  Cont meds for now  -     atorvastatin (LIPITOR) 40 mg tablet; Take 1 Tab by mouth nightly. 5. Acute non-recurrent frontal sinusitis  -     montelukast (SINGULAIR) 10 mg tablet; Take 1 Tab by mouth daily. 6. Moderate persistent asthma with acute exacerbation  Stable   Exam wnl  -     albuterol (PROAIR HFA) 90 mcg/actuation inhaler; Take 2 Puffs by inhalation every four (4) hours as needed for Wheezing or Shortness of Breath. 7. Hypothyroidism, adult  -     levothyroxine (SYNTHROID) 50 mcg tablet; Take 1 Tab by mouth Daily (before breakfast). -     TSH 3RD GENERATION    Other orders  -     gabapentin (NEURONTIN) 100 mg capsule; Take 1 po at night for 3 nights then take 2 po at night for 3 nights then take 3 po at night for 3 nights then take 4 po at night for 3 nights.  -     TSH 3RD GENERATION        This note will not be viewable in MyChart.

## 2018-01-18 ENCOUNTER — HOSPITAL ENCOUNTER (OUTPATIENT)
Dept: ULTRASOUND IMAGING | Age: 68
Discharge: HOME | End: 2018-01-18
Attending: INTERNAL MEDICINE
Payer: MEDICARE

## 2018-01-18 DIAGNOSIS — R20.2 PARESTHESIA: ICD-10-CM

## 2018-01-18 PROBLEM — F33.9 RECURRENT DEPRESSION (HCC): Status: ACTIVE | Noted: 2018-01-18

## 2018-01-18 LAB
25(OH)D3+25(OH)D2 SERPL-MCNC: 38.9 NG/ML (ref 30–100)
ALBUMIN SERPL-MCNC: 4.5 G/DL (ref 3.6–4.8)
ALBUMIN/GLOB SERPL: 2 {RATIO} (ref 1.2–2.2)
ALP SERPL-CCNC: 63 IU/L (ref 39–117)
ALT SERPL-CCNC: 28 IU/L (ref 0–32)
AST SERPL-CCNC: 21 IU/L (ref 0–40)
BILIRUB SERPL-MCNC: 0.3 MG/DL (ref 0–1.2)
BUN SERPL-MCNC: 13 MG/DL (ref 8–27)
BUN/CREAT SERPL: 17 (ref 12–28)
CALCIUM SERPL-MCNC: 9.8 MG/DL (ref 8.7–10.3)
CHLORIDE SERPL-SCNC: 102 MMOL/L (ref 96–106)
CO2 SERPL-SCNC: 27 MMOL/L (ref 18–29)
CREAT SERPL-MCNC: 0.77 MG/DL (ref 0.57–1)
FOLATE SERPL-MCNC: 16.4 NG/ML
GLOBULIN SER CALC-MCNC: 2.2 G/DL (ref 1.5–4.5)
GLUCOSE SERPL-MCNC: 96 MG/DL (ref 65–99)
POTASSIUM SERPL-SCNC: 4.2 MMOL/L (ref 3.5–5.2)
PROT SERPL-MCNC: 6.7 G/DL (ref 6–8.5)
SODIUM SERPL-SCNC: 146 MMOL/L (ref 134–144)
TSH SERPL DL<=0.005 MIU/L-ACNC: 4.31 UIU/ML (ref 0.45–4.5)
VIT B12 SERPL-MCNC: 973 PG/ML (ref 232–1245)

## 2018-01-18 PROCEDURE — 76705 ECHO EXAM OF ABDOMEN: CPT

## 2018-01-25 ENCOUNTER — OFFICE VISIT (OUTPATIENT)
Dept: INTERNAL MEDICINE CLINIC | Age: 68
End: 2018-01-25

## 2018-01-25 VITALS
OXYGEN SATURATION: 96 % | RESPIRATION RATE: 16 BRPM | HEIGHT: 68 IN | DIASTOLIC BLOOD PRESSURE: 78 MMHG | BODY MASS INDEX: 26.98 KG/M2 | SYSTOLIC BLOOD PRESSURE: 120 MMHG | WEIGHT: 178 LBS | HEART RATE: 72 BPM | TEMPERATURE: 98.1 F

## 2018-01-25 DIAGNOSIS — R20.2 PARESTHESIA: Primary | ICD-10-CM

## 2018-01-25 DIAGNOSIS — G43.809 OTHER MIGRAINE WITHOUT STATUS MIGRAINOSUS, NOT INTRACTABLE: ICD-10-CM

## 2018-01-25 DIAGNOSIS — F33.9 RECURRENT DEPRESSION (HCC): ICD-10-CM

## 2018-01-25 RX ORDER — LIDOCAINE AND PRILOCAINE 25; 25 MG/G; MG/G
CREAM TOPICAL AS NEEDED
Qty: 25 G | Refills: 0 | Status: SHIPPED | OUTPATIENT
Start: 2018-01-25 | End: 2018-06-28 | Stop reason: ALTCHOICE

## 2018-01-25 RX ORDER — SUMATRIPTAN 100 MG/1
TABLET, FILM COATED ORAL
Qty: 12 TAB | Refills: 5 | Status: SHIPPED | OUTPATIENT
Start: 2018-01-25 | End: 2018-06-08 | Stop reason: SDUPTHER

## 2018-01-25 RX ORDER — LIDOCAINE AND PRILOCAINE 25; 25 MG/G; MG/G
CREAM TOPICAL AS NEEDED
Qty: 25 G | Refills: 0 | Status: SHIPPED | OUTPATIENT
Start: 2018-01-25 | End: 2018-01-25 | Stop reason: SDUPTHER

## 2018-01-25 NOTE — PROGRESS NOTES
Chief Complaint   Patient presents with    Labs     followw up , new medication     electrical paresthesia  She reports she still has sx. Her ultrasouond was reviewed with her. Labs reviewed with her.she is tolerating the gabapentin but reporting sedation. She can sleep through the night and does not need ambien now. Subjective: Lorinda Hodgkin is a 69 y.o. female with hypertension. Hypertension ROS: taking medications as instructed, no medication side effects noted, no TIA's, no chest pain on exertion, no dyspnea on exertion, no swelling of ankles. New concerns: none. Cholesterol  No se of statin    Depression  She increased her lexapro to 20 mg due to situational stress. Of note pt decreased to 10 mg 3 weeks ago and sx came on 2 weeks. She reports she is still on the lexapro 10 mg since she does not want to increase and we starated her on gabapentin.       Not a smoker  etoh will drink wine or Slovenian whiskey  2 glasses of wine OR a shot of Slovenian whiskey    Past Medical History:   Diagnosis Date    Asthma     Elevated lipids 9/15/2015    Essential hypertension 9/15/2015    Hypothyroidism, adult 9/15/2015    Migraine     allergy mediated in sping and fall    Sun-damaged skin      Past Surgical History:   Procedure Laterality Date    HX BREAST BIOPSY Left yrs ago    neg; surgical bx    HX  SECTION      HX CHOLECYSTECTOMY      HX CYST INCISION AND DRAINAGE Left over years    neg    HX TONSILLECTOMY       Social History     Social History    Marital status:      Spouse name: N/A    Number of children: N/A    Years of education: N/A     Social History Main Topics    Smoking status: Never Smoker    Smokeless tobacco: Never Used    Alcohol use 3.5 oz/week     7 Glasses of wine per week    Drug use: No    Sexual activity: Yes     Partners: Male     Other Topics Concern    None     Social History Narrative    Retired from [employer] in [place]    Stressful        Hx of situational stress     passed cancer duodenal    Mother was sick 79 yo with MS actually did well        Recently   In March 2017        1 son, doing well     Family History   Problem Relation Age of Onset   [facility] MS Mother     Cancer Mother      uterus/skin cancer    Cancer Father 72     colon    Parkinson's Disease Sister 61    MS Brother      Current Outpatient Prescriptions   Medication Sig Dispense Refill    escitalopram oxalate (LEXAPRO) 10 mg tablet TAKE 1 TABLET DAILY 90 Tab 3    losartan-hydroCHLOROthiazide (HYZAAR) 100-25 mg per tablet Take 1 Tab by mouth daily. 90 Tab 3    atorvastatin (LIPITOR) 40 mg tablet Take 1 Tab by mouth nightly. 90 Tab 3    montelukast (SINGULAIR) 10 mg tablet Take 1 Tab by mouth daily. 90 Tab 3    albuterol (PROAIR HFA) 90 mcg/actuation inhaler Take 2 Puffs by inhalation every four (4) hours as needed for Wheezing or Shortness of Breath. 2 Inhaler 3    levothyroxine (SYNTHROID) 50 mcg tablet Take 1 Tab by mouth Daily (before breakfast). 90 Tab 3    gabapentin (NEURONTIN) 100 mg capsule Take 1 po at night for 3 nights then take 2 po at night for 3 nights then take 3 po at night for 3 nights then take 4 po at night for 3 nights. 120 Cap 0    BREO ELLIPTA 200-25 mcg/dose inhaler       zolpidem (AMBIEN) 5 mg tablet Take 1 Tab by mouth nightly as needed for Sleep. Max Daily Amount: 5 mg. Indications: SLEEP-ONSET INSOMNIA 30 Tab 0    SUMAtriptan (IMITREX) 100 mg tablet Take once daily as needed for migraine 12 Tab 5    Omega-3-DHA-EPA-Fish Oil 1,000 mg (120 mg-180 mg) cap Take  by mouth.  multivitamin (ONE A DAY) tablet Take 1 Tab by mouth daily.  ascorbic acid (VITAMIN C) 500 mg tablet Take  by mouth.  azelastine (ASTELIN) 137 mcg (0.1 %) nasal spray       fluticasone (FLONASE) 50 mcg/actuation nasal spray 2 Sprays by Both Nostrils route daily.        Allergies   Allergen Reactions    Sulfa (Sulfonamide Antibiotics) Anaphylaxis       Review of Systems - General ROS: positive for  - fatigue, malaise and sleep disturbance  negative for - chills or fever  Cardiovascular ROS: no chest pain or dyspnea on exertion  Respiratory ROS: positive for - cough  negative for - sputum changes, stridor or wheezing    Visit Vitals    /78 (BP 1 Location: Left arm, BP Patient Position: Sitting)    Pulse 72    Temp 98.1 °F (36.7 °C) (Oral)    Resp 16    Ht 5' 8\" (1.727 m)    Wt 178 lb (80.7 kg)    LMP 03/15/1998    SpO2 96%    BMI 27.06 kg/m2     General Appearance:  Well developed, well nourished,alert and oriented x 3, and individual in no acute distress. Ears/Nose/Mouth/Throat:   Hearing grossly normal.left frontal and maxillary tenderness on palpation         Neck: Supple, no lad, no bruits   Chest:   Lungs clear to auscultation bilaterally. Cardiovascular:  Regular rate and rhythm, S1, S2 normal, no murmur. Abdomen:   Soft, non-tender, bowel sounds are active. Extremities: No edema bilaterally. Skin: Warm and dry, no suspicious lesions. Hypersensitive to left lower back on touch                 Diagnoses and all orders for this visit:    1. Paresthesia  Reviewed us with her and area of cyst not related to nerve center  On exam it is actually the surface which seems to trigger her sx  I will try her on emla or we can try a lidocaine patch  Will have her see neurology to r/o underling process with her family hx of als, MS  She may just have increased hyperactive sensitity  -     REFERRAL TO NEUROLOGY    2. Other migraine without status migrainosus, not intractable  -     SUMAtriptan (IMITREX) 100 mg tablet; Take once daily as needed for migraine    Other orders  -     lidocaine-prilocaine (EMLA) topical cream; Apply  to affected area as needed for Pain. Depression  Cont lexapro for now at 10 mg  Will moniotor at this lowe rdose. This note will not be viewable in [address]

## 2018-01-25 NOTE — MR AVS SNAPSHOT
[facility] 
 
 
 [address] [place] [address] [facility] 
[phone] Patient: Madhu Heller MRN: [id number] QWS:[birth year] Visit Information Date & Time Provider Department Dept. Phone Encounter #  
 1/25/2018  8:20 AM Mercedes Soria MD [facility] [address] [id number] Upcoming Health Maintenance Date Due  
 GLAUCOMA SCREENING Q2Y 8/1/2017 Influenza Age 5 to Adult 8/1/2017 FOBT Q 1 YEAR AGE 50-75 9/27/2017 MEDICARE YEARLY EXAM 4/27/2018 BREAST CANCER SCRN MAMMOGRAM 5/23/2019 DTaP/Tdap/Td series (2 - Td) 9/27/2026 Allergies as of 1/25/2018  Review Complete On: 1/25/2018 By: Mercedes Soria MD  
  
 Severity Noted Reaction Type Reactions Sulfa (Sulfonamide Antibiotics) High 03/15/2015    Anaphylaxis Current Immunizations  Reviewed on 1/31/2017 Name Date Influenza Vaccine 9/15/2015 Pneumococcal Conjugate (PCV-13) 10/4/2016 Not reviewed this visit You Were Diagnosed With   
  
 Codes Comments Paresthesia    -  Primary ICD-10-CM: R20.2 ICD-9-CM: 782.0 Other migraine without status migrainosus, not intractable     ICD-10-CM: G53.701 ICD-9-CM: 346.80 Vitals BP Pulse Temp Resp Height(growth percentile) Weight(growth percentile) 120/78 (BP 1 Location: Left arm, BP Patient Position: Sitting) 72 98.1 °F (36.7 °C) (Oral) 16 5' 8\" (1.727 m) 178 lb (80.7 kg) LMP SpO2 BMI OB Status Smoking Status 03/15/1998 96% 27.06 kg/m2 Postmenopausal Never Smoker BMI and BSA Data Body Mass Index Body Surface Area  
 27.06 kg/m 2 1.97 m 2 Preferred Pharmacy Pharmacy Name Phone [address] [facility] [address] [phone] Your Updated Medication List  
  
   
This list is accurate as of: 1/25/18  9:26 AM.  Always use your most recent med list.  
  
  
  
  
 albuterol 90 mcg/actuation inhaler Commonly known as:  PROAIR HFA Take 2 Puffs by inhalation every four (4) hours as needed for Wheezing or Shortness of Breath. atorvastatin 40 mg tablet Commonly known as:  LIPITOR Take 1 Tab by mouth nightly. azelastine 137 mcg (0.1 %) nasal spray Commonly known as:  ASTELIN  
  
 BREO ELLIPTA 200-25 mcg/dose inhaler Generic drug:  fluticasone-vilanterol  
  
 escitalopram oxalate 10 mg tablet Commonly known as:  Byron Ritchey TAKE 1 TABLET DAILY  
  
 FLONASE 50 mcg/actuation nasal spray Generic drug:  fluticasone 2 Sprays by Both Nostrils route daily. gabapentin 100 mg capsule Commonly known as:  NEURONTIN Take 1 po at night for 3 nights then take 2 po at night for 3 nights then take 3 po at night for 3 nights then take 4 po at night for 3 nights.  
  
 levothyroxine 50 mcg tablet Commonly known as:  synthroid Take 1 Tab by mouth Daily (before breakfast). lidocaine-prilocaine topical cream  
Commonly known as:  EMLA Apply  to affected area as needed for Pain.  
  
 losartan-hydroCHLOROthiazide 100-25 mg per tablet Commonly known as:  HYZAAR Take 1 Tab by mouth daily. montelukast 10 mg tablet Commonly known as:  SINGULAIR Take 1 Tab by mouth daily. multivitamin tablet Commonly known as:  ONE A DAY Take 1 Tab by mouth daily. Omega-3-DHA-EPA-Fish Oil 1,000 mg (120 mg-180 mg) Cap Take  by mouth. SUMAtriptan 100 mg tablet Commonly known as:  IMITREX Take once daily as needed for migraine VITAMIN C 500 mg tablet Generic drug:  ascorbic acid (vitamin C) Take  by mouth.  
  
 zolpidem 5 mg tablet Commonly known as:  AMBIEN Take 1 Tab by mouth nightly as needed for Sleep. Max Daily Amount: 5 mg. Indications: SLEEP-ONSET INSOMNIA Prescriptions Printed Refills  
 lidocaine-prilocaine (EMLA) topical cream 0 Sig: Apply  to affected area as needed for Pain. Class: Print Route: Topical  
  
Prescriptions Sent to Pharmacy Refills SUMAtriptan (IMITREX) 100 mg tablet 5 Sig: Take once daily as needed for migraine Class: Normal  
 Pharmacy: [address], [address] [place] [address] #: [phone] We Performed the Following REFERRAL TO NEUROLOGY [JPW24 Custom] Comments:  
 Paresthesia Referral Information Referral ID Referred By Referred To  
  
 [id number] Inna Mitchell MD   
   [place] [address] [address] [facility], [address] [facility] Phone: [phone] Fax: [phone] Visits Status Start Date End Date 1 New Request 1/25/18 1/25/19 If your referral has a status of pending review or denied, additional information will be sent to support the outcome of this decision. Introducing [facility]! Dear Narda Harris: 
Thank you for requesting a [employer] account. Our records indicate that you have previously registered for a [employer] account but its currently inactive. Please call our [employer] support line at [phone]. Additional Information If you have questions, please visit the Frequently Asked Questions section of the [employer] website at [email or web address] Remember, [employer] is NOT to be used for urgent needs. For medical emergencies, dial 911. Now available from your iPhone and Android! Please provide this summary of care documentation to your next provider. Your primary care clinician is listed as Kellee Spicer. If you have any questions after today's visit, please call [phone].

## 2018-01-30 ENCOUNTER — OFFICE VISIT (OUTPATIENT)
Dept: NEUROLOGY | Age: 68
End: 2018-01-30

## 2018-01-30 VITALS — SYSTOLIC BLOOD PRESSURE: 125 MMHG | OXYGEN SATURATION: 98 % | DIASTOLIC BLOOD PRESSURE: 75 MMHG | HEART RATE: 77 BPM

## 2018-01-30 DIAGNOSIS — F32.89 OTHER DEPRESSION: ICD-10-CM

## 2018-01-30 DIAGNOSIS — R20.0 NUMBNESS AND TINGLING: Primary | ICD-10-CM

## 2018-01-30 DIAGNOSIS — M54.2 CERVICALGIA: ICD-10-CM

## 2018-01-30 DIAGNOSIS — R20.2 NUMBNESS AND TINGLING: Primary | ICD-10-CM

## 2018-01-30 RX ORDER — ESCITALOPRAM OXALATE 20 MG/1
TABLET ORAL
Qty: 90 TAB | Refills: 2 | Status: SHIPPED | OUTPATIENT
Start: 2018-01-30 | End: 2018-06-08 | Stop reason: SDUPTHER

## 2018-01-30 RX ORDER — PREGABALIN 50 MG/1
50 CAPSULE ORAL 2 TIMES DAILY
Qty: 42 CAP | Refills: 0 | Status: SHIPPED | COMMUNITY
Start: 2018-01-30 | End: 2018-06-28 | Stop reason: ALTCHOICE

## 2018-01-30 NOTE — LETTER
Dear Sima Burton MD, Thank you for allowing me to see your patient, Evan Greco for a neurological consultation. Please see my impression and recommendations as outlined in my note. Sincerely, Adrián Souza MD 
[facility] at [address] [facility] BY: 
Sima Burton MD 
 
CHIEF COMPLAINT: 
Left sided nerve pain HISTORY OF PRESENT ILLNESS HISTORY PROVIDED BY: 
Patient Evan Greco is a 69 y.o. female who I am asked to see in consultation for left sided nerve pain. She reports the pain starts in her side in the midthoracic area and goes down the left and up the head. She has had that off and on for years. This mostly flares when she gets really tired. However the last month it happens every day. She was on lexapro 20 and cut this to 10mg. She decreased because she was stable in terms of mood. She did try gabapentin and this stopped the sensation but gave her severe headaches. She got up to 300mg qhs. The headaches were severe with vomiting. Last night was her first night off the gabapentin. She does have a migraine history. She had an ultrasound last week that showed no change in her cyst that she has. She had an EMG/NCS about 10 years ago of her legs and arm and this was negative. No weakness on the left side. No relieving factors for this. She has to take a sleeping pill to sleep. The sensation is tingling and scratching that is constant discomfort. She feels like it can make her breathless. She doesn't recall neuroimaging but she thinks she does have cervical stenosis. She was doing well in terms of migraines until trying the gabapentin. She had been headache for over 2 years. She does have cervical neck pain. She recently retired here several years ago from [place]. She has been enjoying her intermediate has been staying very active. PMH Past Medical History:  
Diagnosis Date  Asthma  Elevated lipids 9/15/2015  Essential hypertension 9/15/2015  Hypothyroidism, adult 9/15/2015  Migraine   
 allergy mediated in sping and fall  Sun-damaged skin [address] Social History Social History  Marital status:  Spouse name: N/A  
 Number of children: N/A  
 Years of education: N/A Social History Main Topics  Smoking status: Never Smoker  Smokeless tobacco: Never Used  Alcohol use 3.5 oz/week  
  7 Glasses of wine per week  Drug use: No  
 Sexual activity: Yes  
  Partners: Male Other Topics Concern  Not on file Social History Narrative Retired from [employer] in [place] Stressful Hx of situational stress  passed cancer duodenal  
 Mother was sick 81 yo with MS actually did well Recently   In March 2017  
   
 1 son, doing well [facility] Family History Problem Relation Age of Onset  MS Mother  Cancer Mother   
  uterus/skin cancer  Cancer Father 72  
  colon  Parkinson's Disease Sister 61  MS Brother ALLERGIES Allergies Allergen Reactions  Sulfa (Sulfonamide Antibiotics) Anaphylaxis CURRENT MEDS Current Outpatient Prescriptions Medication Sig Dispense Refill  SUMAtriptan (IMITREX) 100 mg tablet Take once daily as needed for migraine 12 Tab 5  lidocaine-prilocaine (EMLA) topical cream Apply  to affected area as needed for Pain. 25 g 0  
 escitalopram oxalate (LEXAPRO) 10 mg tablet TAKE 1 TABLET DAILY 90 Tab 3  
 losartan-hydroCHLOROthiazide (HYZAAR) 100-25 mg per tablet Take 1 Tab by mouth daily. 90 Tab 3  
 atorvastatin (LIPITOR) 40 mg tablet Take 1 Tab by mouth nightly. 90 Tab 3  
 montelukast (SINGULAIR) 10 mg tablet Take 1 Tab by mouth daily. 90 Tab 3  
 albuterol (PROAIR HFA) 90 mcg/actuation inhaler Take 2 Puffs by inhalation every four (4) hours as needed for Wheezing or Shortness of Breath.  2 Inhaler 3  
  levothyroxine (SYNTHROID) 50 mcg tablet Take 1 Tab by mouth Daily (before breakfast). 90 Tab 3  
 gabapentin (NEURONTIN) 100 mg capsule Take 1 po at night for 3 nights then take 2 po at night for 3 nights then take 3 po at night for 3 nights then take 4 po at night for 3 nights. 120 Cap 0  
 BREO ELLIPTA 200-25 mcg/dose inhaler  azelastine (ASTELIN) 137 mcg (0.1 %) nasal spray  zolpidem (AMBIEN) 5 mg tablet Take 1 Tab by mouth nightly as needed for Sleep. Max Daily Amount: 5 mg. Indications: SLEEP-ONSET INSOMNIA 30 Tab 0  
 fluticasone (FLONASE) 50 mcg/actuation nasal spray 2 Sprays by Both Nostrils route daily.  Omega-3-DHA-EPA-Fish Oil 1,000 mg (120 mg-180 mg) cap Take  by mouth.  multivitamin (ONE A DAY) tablet Take 1 Tab by mouth daily.  ascorbic acid (VITAMIN C) 500 mg tablet Take  by mouth. REVIEW OF SYSTEMS:  
 
Y  N       Y  N  Y  N   Y  N 
  AIDS            Falls    Memory Loss     Shortness of breath Anxiety            Fatigue   Muscle Pain           Skipped beats Chest Pain     Frequent HA   Ms Weakness        Snoring Constipation  Hearing loss   Nause/Vomiting     Stomach Pain Cough           Hepatitis   Neuropathy            Swallowing difficulty Depression   Incontinence   Poor appetite         Vertigo Diarrhea         Joint Pain   Rash                      Visual disturbances Fainting          Leg Swelling   Ringing ears          Weight changes Unable to obtain  ROS due to  mental status change  sedated   intubated PREVIOUS WORKUP IMAGING: none LABS Results for orders placed or performed in visit on 01/17/18 VITAMIN B12 & FOLATE Result Value Ref Range Vitamin B12 973 232 - 1245 pg/mL Folate 16.4 >3.0 ng/mL METABOLIC PANEL, COMPREHENSIVE Result Value Ref Range Glucose 96 65 - 99 mg/dL BUN 13 8 - 27 mg/dL Creatinine 0.77 0.57 - 1.00 mg/dL  GFR est non-AA 79 >59 mL/min/1.73  
 GFR est AA 91 >59 mL/min/1.73  
 BUN/Creatinine ratio 17 12 - 28 Sodium 146 (H) 134 - 144 mmol/L Potassium 4.2 3.5 - 5.2 mmol/L Chloride 102 96 - 106 mmol/L  
 CO2 27 18 - 29 mmol/L Calcium 9.8 8.7 - 10.3 mg/dL Protein, total 6.7 6.0 - 8.5 g/dL Albumin 4.5 3.6 - 4.8 g/dL GLOBULIN, TOTAL 2.2 1.5 - 4.5 g/dL A-G Ratio 2.0 1.2 - 2.2 Bilirubin, total 0.3 0.0 - 1.2 mg/dL Alk. phosphatase 63 39 - 117 IU/L  
 AST (SGOT) 21 0 - 40 IU/L  
 ALT (SGPT) 28 0 - 32 IU/L  
VITAMIN D, 25 HYDROXY Result Value Ref Range VITAMIN D, 25-HYDROXY 38.9 30.0 - 100.0 ng/mL TSH 3RD GENERATION Result Value Ref Range TSH 4.310 0.450 - 4.500 uIU/mL PHYSICAL EXAM 
Visit Vitals  /75  Pulse 77  LMP 03/15/1998  SpO2 98% General:  Alert, cooperative, no distress. Head:  Normocephalic, without obvious abnormality, atraumatic. Eyes:  Conjunctivae/corneas clear. Pupils equal, round, reactive to light. Extraocular movements intact, VFF, NO papilledema Lungs: 
Heart:   Non labored breathing Regular rate and rhythm, no carotid bruits Abdomen:   Soft, non-distended Extremities: Extremities normal, atraumatic, no cyanosis or edema. Pulses: 2+ and symmetric all extremities. Skin: Skin color, texture, turgor normal. No rashes or lesions. Neurologic:  Gen: Attention normal 
           Language: naming, repetition, fluency normal 
           Memory: intact recent and remote memory Cranial Nerves: 
I: smell Not tested II: visual fields Full to confrontation II: pupils Equal, round, reactive to light II: optic disc No papilledema III,VII: ptosis none III,IV,VI: extraocular muscles  Full ROM V: mastication normal  
V: facial light touch sensation  normal  
VII: facial muscle function   symmetric VIII: hearing symmetric IX: soft palate elevation  normal  
XI: trapezius strength  5/5 XI: sternocleidomastoid strength 5/5 XI: neck flexion strength  5/5  
 XII: tongue  midline Motor: normal bulk and tone, no tremor Strength: 5/5 all four extremities Sensory: intact to LT, decreased pinprick on left Coordination: FTN intact, Rhomberg negative Gait: normal gait including tandem Reflexes: 2+ throughout IMPRESSION Dorina Duffy is a 69 y.o. female who presents for evaluation of left sided nerve pain. She reports recently this has increased in severity. She thinks this may be secondary to decreasing her Lexapro. Patient reports that in the past she did have evaluation for this but everything was normal.  She does have a history of cervical stenosis. We will check to see if there is been any progression in this. RECOMMENDATIONS 1. MRI of the cervical spine 2. Off of gabapentin due to side effects 3. We will try to increase Lexapro back to 20 mg daily 4. If increase in Lexapro does not help the symptoms, patient will start Lyrica. Samples of Lyrica 50 mg given to patient. Side effects discussed. 5.  Discussed potential etiology of patient's symptoms. May need to do further neuroimaging if above is negative. 6.  Pt has had a previous EMG 
 
FU 3 months Filipe Graham MD 
 
CC: Lisa Mujica MD 
Fax: [phone] This note was created using voice recognition software. Despite editing, there may be syntax errors. This note will not be viewable in [address]

## 2018-01-30 NOTE — PATIENT INSTRUCTIONS
Learning About Living Espinoza  What is a living will? A living will is a legal form you use to write down the kind of care you want at the end of your life. It is used by the health professionals who will treat you if you aren't able to decide for yourself. If you put your wishes in writing, your loved ones and others will know what kind of care you want. They won't need to guess. This can ease your mind and be helpful to others. A living will is not the same as an estate or property will. An estate will explains what you want to happen with your money and property after you die. Is a living will a legal document? A living will is a legal document. Each state has its own laws about living quiñones. If you move to another state, make sure that your living will is legal in the state where you now live. Or you might use a universal form that has been approved by many states. This kind of form can sometimes be completed and stored online. Your electronic copy will then be available wherever you have a connection to the Internet. In most cases, doctors will respect your wishes even if you have a form from a different state. · You don't need an  to complete a living will. But legal advice can be helpful if your state's laws are unclear, your health history is complicated, or your family can't agree on what should be in your living will. · You can change your living will at any time. Some people find that their wishes about end-of-life care change as their health changes. · In addition to making a living will, think about completing a medical power of  form. This form lets you name the person you want to make end-of-life treatment decisions for you (your \"health care agent\") if you're not able to. Many hospitals and nursing homes will give you the forms you need to complete a living will and a medical power of .   · Your living will is used only if you can't make or communicate decisions for yourself anymore. If you become able to make decisions again, you can accept or refuse any treatment, no matter what you wrote in your living will. · Your state may offer an online registry. This is a place where you can store your living will online so the doctors and nurses who need to treat you can find it right away. What should you think about when creating a living will? Talk about your end-of-life wishes with your family members and your doctor. Let them know what you want. That way the people making decisions for you won't be surprised by your choices. Think about these questions as you make your living will:  · Do you know enough about life support methods that might be used? If not, talk to your doctor so you know what might be done if you can't breathe on your own, your heart stops, or you're unable to swallow. · What things would you still want to be able to do after you receive life-support methods? Would you want to be able to walk? To speak? To eat on your own? To live without the help of machines? · If you have a choice, where do you want to be cared for? In your home? At a hospital or nursing home? · Do you want certain Sabianism practices performed if you become very ill? · If you have a choice at the end of your life, where would you prefer to die? At home? In a hospital or nursing home? Somewhere else? · Would you prefer to be buried or cremated? · Do you want your organs to be donated after you die? What should you do with your living will? · Make sure that your family members and your health care agent have copies of your living will. · Give your doctor a copy of your living will to keep in your medical record. If you have more than one doctor, make sure that each one has a copy. · You may want to put a copy of your living will where it can be easily found. Where can you learn more? Go to http://charan-kamala.info/.   Enter X409 in the search box to learn more about \"Learning About Living Espinoza. \"  Current as of: September 24, 2016  Content Version: 11.4  © 1832-8845 Healthwise, Incorporated. Care instructions adapted under license by [employer] (which disclaims liability or warranty for this information). If you have questions about a medical condition or this instruction, always ask your healthcare professional. [address] any warranty or liability for your use of this information. [facility]   Statement to Patients  April 1, 2014      In an effort to ensure the large volume of patient prescription refills is processed in the most efficient and expeditious manner, we are asking our patients to assist us by calling your Pharmacy for all prescription refills, this will include also your  Mail Order Pharmacy. The pharmacy will contact our office electronically to continue the refill process. Please do not wait until the last minute to call your pharmacy. We need at least 48 hours (2days) to fill prescriptions. We also encourage you to call your pharmacy before going to  your prescription to make sure it is ready. With regard to controlled substance prescription refill requests (narcotic refills) that need to be picked up at our office, we ask your cooperation by providing us with at least 72 hours (3days) notice that you will need a refill. We will not refill narcotic prescription refill requests after 4:00pm on any weekday, Monday through Thursday, or after 2:00pm on Fridays, or on the weekends. We encourage everyone to explore another way of getting your prescription refill request processed using Cloud4Wi, our patient web portal through our electronic medical record system. Cloud[employer]Wi is an efficient and effective way to communicate your medication request directly to the office and  downloadable as an daisha on your smart phone .  Cloud4Wi also features a review functionality that allows you to view your medication list as well as leave messages for your physician. Are you ready to get connected? If so please review the attatched instructions or speak to any of our staff to get you set up right away! Thank you so much for your cooperation. Should you have any questions please contact our Practice Administrator. The Physicians and Staff,  [facility]     If we have ordered testing for you, we typically do not call patients with results. Your doctor or nurse will contact you if there are critical results that need to be addressed before your next appointment. We also schedule follow up appointments so that your results can be discussed in person and any questions you have regarding them may be addressed. Additionally, results may be found by using the My Chart feature and one of our patient service representatives at the  can give you instructions on how to access this feature of our electronic medical record system. Patient Instructions/Plans:  If the increase in Lexapro is not helpful please start Lyrica  Lyrica will be 50 mg twice a day     Pregabalin (Lyrica) - (By mouth)   Why this medicine is used:   Treats nerve and muscle pain, including fibromyalgia. Also treats seizures. Contact a nurse or doctor right away if you have:  · Thoughts of hurting yourself  · Muscle pain, tenderness, weakness     Common side effects:  · Rapid weight gain; swelling in your hands, ankles, or feet  · Confusion, trouble concentrating, tiredness  · Constipation, dry mouth  · Headache  © 2017 [address] Information is for End User's use only and may not be sold, redistributed or otherwise used for commercial purposes.

## 2018-01-30 NOTE — MR AVS SNAPSHOT
[address] [facility] [address] [facility] [address] [phone] Patient: Lorinda Hodgkin MRN: [id number] Visit Information Date & Time Provider Department Dept. Phone Encounter #  
 1/30/2018 10:00 AM Hill Das MD [facility] [phone] [id number] Upcoming Health Maintenance Date Due  
 GLAUCOMA SCREENING Q2Y 8/1/2017 Influenza Age 5 to Adult 8/1/2017 FOBT Q 1 YEAR AGE 50-75 9/27/2017 MEDICARE YEARLY EXAM 4/27/2018 BREAST CANCER SCRN MAMMOGRAM 5/23/2019 DTaP/Tdap/Td series (2 - Td) 9/27/2026 Allergies as of 1/30/2018  Review Complete On: 1/30/2018 By: Elodia Duval Severity Noted Reaction Type Reactions Sulfa (Sulfonamide Antibiotics) High 03/15/2015    Anaphylaxis Current Immunizations  Reviewed on 1/31/2017 Name Date Influenza Vaccine 9/15/2015 Pneumococcal Conjugate (PCV-13) 10/4/2016 Not reviewed this visit You Were Diagnosed With   
  
 Codes Comments Cervicalgia    -  Primary ICD-10-CM: M54.2 ICD-9-CM: 723.1 Other depression     ICD-10-CM: F32.89 ICD-9-CM: 239 Numbness and tingling     ICD-10-CM: R20.0, R20.2 ICD-9-CM: 965. 0 Vitals BP Pulse LMP SpO2 OB Status Smoking Status 125/75 77 03/15/1998 98% Postmenopausal Never Smoker Preferred Pharmacy Pharmacy Name Phone [address] [facility] [phone] Your Updated Medication List  
  
   
This list is accurate as of: 1/30/18 10:40 AM.  Always use your most recent med list.  
  
  
  
  
 albuterol 90 mcg/actuation inhaler Commonly known as:  PROAIR HFA Take 2 Puffs by inhalation every four (4) hours as needed for Wheezing or Shortness of Breath. atorvastatin 40 mg tablet Commonly known as:  LIPITOR Take 1 Tab by mouth nightly. azelastine 137 mcg (0.1 %) nasal spray Commonly known as:  ASTELIN  
  
 BREO ELLIPTA 200-25 mcg/dose inhaler Generic drug:  fluticasone-vilanterol  
  
 escitalopram oxalate 20 mg tablet Commonly known as:  Veola Fujita TAKE 1 TABLET DAILY  
  
 FLONASE 50 mcg/actuation nasal spray Generic drug:  fluticasone 2 Sprays by Both Nostrils route daily. levothyroxine 50 mcg tablet Commonly known as:  synthroid Take 1 Tab by mouth Daily (before breakfast). lidocaine-prilocaine topical cream  
Commonly known as:  EMLA Apply  to affected area as needed for Pain.  
  
 losartan-hydroCHLOROthiazide 100-25 mg per tablet Commonly known as:  HYZAAR Take 1 Tab by mouth daily. montelukast 10 mg tablet Commonly known as:  SINGULAIR Take 1 Tab by mouth daily. multivitamin tablet Commonly known as:  ONE A DAY Take 1 Tab by mouth daily. Omega-3-DHA-EPA-Fish Oil 1,000 mg (120 mg-180 mg) Cap Take  by mouth.  
  
 pregabalin 50 mg capsule Commonly known as:  Yusra Mia Take 1 Cap by mouth two (2) times a day. Max Daily Amount: 100 mg. SUMAtriptan 100 mg tablet Commonly known as:  IMITREX Take once daily as needed for migraine VITAMIN C 500 mg tablet Generic drug:  ascorbic acid (vitamin C) Take  by mouth.  
  
 zolpidem 5 mg tablet Commonly known as:  AMBIEN Take 1 Tab by mouth nightly as needed for Sleep. Max Daily Amount: 5 mg. Indications: SLEEP-ONSET INSOMNIA Prescriptions Sent to Pharmacy Refills  
 escitalopram oxalate (LEXAPRO) 20 mg tablet 2 Sig: TAKE 1 TABLET DAILY Class: Normal  
 Pharmacy: [address], [address] [place] [address] Ph #: [phone] To-Do List   
 01/31/2018 Imaging:  MRI CERV SPINE W WO CONT Patient Instructions Marie Ordonez 1726 What is a living will?  
 
A living will is a legal form you use to write down the kind of care you want at the end of your life. It is used by the health professionals who will treat you if you aren't able to decide for yourself. If you put your wishes in writing, your loved ones and others will know what kind of care you want. They won't need to guess. This can ease your mind and be helpful to others. A living will is not the same as an estate or property will. An estate will explains what you want to happen with your money and property after you die. Is a living will a legal document? A living will is a legal document. Each state has its own laws about living quiñones. If you move to another state, make sure that your living will is legal in the state where you now live. Or you might use a universal form that has been approved by many states. This kind of form can sometimes be completed and stored online. Your electronic copy will then be available wherever you have a connection to the Internet. In most cases, doctors will respect your wishes even if you have a form from a different state. · You don't need an  to complete a living will. But legal advice can be helpful if your state's laws are unclear, your health history is complicated, or your family can't agree on what should be in your living will. · You can change your living will at any time. Some people find that their wishes about end-of-life care change as their health changes. · In addition to making a living will, think about completing a medical power of  form. This form lets you name the person you want to make end-of-life treatment decisions for you (your \"health care agent\") if you're not able to. Many hospitals and nursing homes will give you the forms you need to complete a living will and a medical power of . · Your living will is used only if you can't make or communicate decisions for yourself anymore.  If you become able to make decisions again, you can accept or refuse any treatment, no matter what you wrote in your living will. · Your state may offer an online registry. This is a place where you can store your living will online so the doctors and nurses who need to treat you can find it right away. What should you think about when creating a living will? Talk about your end-of-life wishes with your family members and your doctor. Let them know what you want. That way the people making decisions for you won't be surprised by your choices. Think about these questions as you make your living will: · Do you know enough about life support methods that might be used? If not, talk to your doctor so you know what might be done if you can't breathe on your own, your heart stops, or you're unable to swallow. · What things would you still want to be able to do after you receive life-support methods? Would you want to be able to walk? To speak? To eat on your own? To live without the help of machines? · If you have a choice, where do you want to be cared for? In your home? At a hospital or nursing home? · Do you want certain Protestant practices performed if you become very ill? · If you have a choice at the end of your life, where would you prefer to die? At home? In a hospital or nursing home? Somewhere else? · Would you prefer to be buried or cremated? · Do you want your organs to be donated after you die? What should you do with your living will? · Make sure that your family members and your health care agent have copies of your living will. · Give your doctor a copy of your living will to keep in your medical record. If you have more than one doctor, make sure that each one has a copy. · You may want to put a copy of your living will where it can be easily found. Where can you learn more? Go to http://charan-kamala.info/. Enter J953 in the search box to learn more about \"Learning About Living [place]. \" 
 Current as of: September 24, 2016 Content Version: 11.4 © 6649-0130 Healthwise, [employer]. Care instructions adapted under license by [employer] (which disclaims liability or warranty for this information). If you have questions about a medical condition or this instruction, always ask your healthcare professional. [address] any warranty or liability for your use of this information. PRESCRIPTION REFILL POLICY [facility] Statement to Patients April 1, 2014 In an effort to ensure the large volume of patient prescription refills is processed in the most efficient and expeditious manner, we are asking our patients to assist us by calling your Pharmacy for all prescription refills, this will include also your  Mail Order Pharmacy. The pharmacy will contact our office electronically to continue the refill process. Please do not wait until the last minute to call your pharmacy. We need at least 48 hours (2days) to fill prescriptions. We also encourage you to call your pharmacy before going to  your prescription to make sure it is ready. With regard to controlled substance prescription refill requests (narcotic refills) that need to be picked up at our office, we ask your cooperation by providing us with at least 72 hours (3days) notice that you will need a refill. We will not refill narcotic prescription refill requests after 4:00pm on any weekday, Monday through Thursday, or after 2:00pm on Fridays, or on the weekends. We encourage everyone to explore another way of getting your prescription refill request processed using [employer], our patient web portal through our electronic medical record system. Le[employer]k is an efficient and effective way to communicate your medication request directly to the office and  downloadable as an daisha on your smart phone .  [employer] also features a review functionality that allows you to view your medication list as well as leave messages for your physician. Are you ready to get connected? If so please review the attatched instructions or speak to any of our staff to get you set up right away! Thank you so much for your cooperation. Should you have any questions please contact our Practice Administrator. The Physicians and Staff,  [facility] If we have ordered testing for you, we typically do not call patients with results. Your doctor or nurse will contact you if there are critical results that need to be addressed before your next appointment. We also schedule follow up appointments so that your results can be discussed in person and any questions you have regarding them may be addressed. Additionally, results may be found by using the My Chart feature and one of our patient service representatives at the  can give you instructions on how to access this feature of our electronic medical record system. Patient Instructions/Plans: 
If the increase in Lexapro is not helpful please start Lyrica Lyrica will be 50 mg twice a day Pregabalin (Lyrica) - (By mouth) Why this medicine is used:  
Treats nerve and muscle pain, including fibromyalgia. Also treats seizures. Contact a nurse or doctor right away if you have: · Thoughts of hurting yourself · Muscle pain, tenderness, weakness Common side effects: 
· Rapid weight gain; swelling in your hands, ankles, or feet · Confusion, trouble concentrating, tiredness · Constipation, dry mouth 
· Headache © 2017 [address]  Information is for End User's use only and may not be sold, redistributed or otherwise used for commercial purposes. Introducing [facility] [place] [facility]! Dear Leonardo Maya: 
Thank you for requesting a Walkmore account.   Our records indicate that you have previously registered for a [employer]more account but its currently inactive. Please call our [employer] support line at [phone]. Additional Information If you have questions, please visit the Frequently Asked Questions section of the [employer] website at [email or web address] Remember, [employer] is NOT to be used for urgent needs. For medical emergencies, dial 911. Now available from your iPhone and Android! Please provide this summary of care documentation to your next provider. Your primary care clinician is listed as Sylwia Shaw. If you have any questions after today's visit, please call [phone].

## 2018-01-30 NOTE — PROGRESS NOTES
NEUROLOGY NEW PATIENT CONSULTATION    REFERRED BY:  Codi Avitia MD    CHIEF COMPLAINT:  Left sided nerve pain    HISTORY OF PRESENT ILLNESS    HISTORY PROVIDED BY:  Patient      Lupe Villafuerte is a 69 y.o. female who I am asked to see in consultation for left sided nerve pain. She reports the pain starts in her side in the midthoracic area and goes down the left and up the head. She has had that off and on for years. This mostly flares when she gets really tired. However the last month it happens every day. She was on lexapro 20 and cut this to 10mg. She decreased because she was stable in terms of mood. She did try gabapentin and this stopped the sensation but gave her severe headaches. She got up to 300mg qhs. The headaches were severe with vomiting. Last night was her first night off the gabapentin. She does have a migraine history. She had an ultrasound last week that showed no change in her cyst that she has. She had an EMG/NCS about 10 years ago of her legs and arm and this was negative. No weakness on the left side. No relieving factors for this. She has to take a sleeping pill to sleep. The sensation is tingling and scratching that is constant discomfort. She feels like it can make her breathless. She doesn't recall neuroimaging but she thinks she does have cervical stenosis. She was doing well in terms of migraines until trying the gabapentin. She had been headache for over 2 years. She does have cervical neck pain. She recently retired here several years ago from [place]. She has been enjoying her senior living has been staying very active.     PMH  Past Medical History:   Diagnosis Date    Asthma     Elevated lipids 9/15/2015    Essential hypertension 9/15/2015    Hypothyroidism, adult 9/15/2015    Migraine     allergy mediated in sping and fall    Sun-damaged skin        SH  Social History     Social History    Marital status:      Spouse name: N/A    Number of children: N/A    Years of education: N/A     Social History Main Topics    Smoking status: Never Smoker    Smokeless tobacco: Never Used    Alcohol use 3.5 oz/week     7 Glasses of wine per week    Drug use: No    Sexual activity: Yes     Partners: Male     Other Topics Concern    Not on file     Social History Narrative    Retired from [employer] in [place]    Stressful        Hx of situational stress     passed cancer duodenal    Mother was sick 81 yo with MS actually did well        Recently   In March 2017        1 son, doing well       FH  Family History   Problem Relation Age of Onset   [address] [facility] MS Mother     Cancer Mother      uterus/skin cancer    Cancer Father 72     colon    Parkinson's Disease Sister 61    MS Brother        ALLERGIES  Allergies   Allergen Reactions    Sulfa (Sulfonamide Antibiotics) Anaphylaxis       CURRENT MEDS  Current Outpatient Prescriptions   Medication Sig Dispense Refill    SUMAtriptan (IMITREX) 100 mg tablet Take once daily as needed for migraine 12 Tab 5    lidocaine-prilocaine (EMLA) topical cream Apply  to affected area as needed for Pain. 25 g 0    escitalopram oxalate (LEXAPRO) 10 mg tablet TAKE 1 TABLET DAILY 90 Tab 3    losartan-hydroCHLOROthiazide (HYZAAR) 100-25 mg per tablet Take 1 Tab by mouth daily. 90 Tab 3    atorvastatin (LIPITOR) 40 mg tablet Take 1 Tab by mouth nightly. 90 Tab 3    montelukast (SINGULAIR) 10 mg tablet Take 1 Tab by mouth daily. 90 Tab 3    albuterol (PROAIR HFA) 90 mcg/actuation inhaler Take 2 Puffs by inhalation every four (4) hours as needed for Wheezing or Shortness of Breath. 2 Inhaler 3    levothyroxine (SYNTHROID) 50 mcg tablet Take 1 Tab by mouth Daily (before breakfast). 90 Tab 3    gabapentin (NEURONTIN) 100 mg capsule Take 1 po at night for 3 nights then take 2 po at night for 3 nights then take 3 po at night for 3 nights then take 4 po at night for 3 nights.  120 Cap 0    BREO ELLIPTA 200-25 mcg/dose inhaler       azelastine (ASTELIN) 137 mcg (0.1 %) nasal spray       zolpidem (AMBIEN) 5 mg tablet Take 1 Tab by mouth nightly as needed for Sleep. Max Daily Amount: 5 mg. Indications: SLEEP-ONSET INSOMNIA 30 Tab 0    fluticasone (FLONASE) 50 mcg/actuation nasal spray 2 Sprays by Both Nostrils route daily.  Omega-3-DHA-EPA-Fish Oil 1,000 mg (120 mg-180 mg) cap Take  by mouth.  multivitamin (ONE A DAY) tablet Take 1 Tab by mouth daily.  ascorbic acid (VITAMIN C) 500 mg tablet Take  by mouth.          REVIEW OF SYSTEMS:     Y  N       Y  N  Y  N   Y  N  [] [x] AIDS          [] [x] Falls  [] [x] Memory Loss  [] [x]  Shortness of breath  [] [x] Anxiety          [x] [] Fatigue [] [x] Muscle Pain        [] [x]  Skipped beats  [] [x] Chest Pain   [] [x] Frequent HA [] [x] Ms Weakness     [x] []  Snoring  [] [x] Constipation [] [x]Hearing loss [] [x] Nause/Vomiting  [] [x]  Stomach Pain  [] [x] Cough          [] [x]Hepatitis [] [x] Neuropathy         [] [x]  Swallowing difficulty  [] [x] Depression  [] [x]Incontinence [] [x] Poor appetite      [] [x]  Vertigo  [] [x] Diarrhea       [x] [] Joint Pain [] [x] Rash                   [] [x]  Visual disturbances  [] [x] Fainting        [] [x] Leg Swelling [] [x] Ringing ears       [x] []  Weight changes      []Unable to obtain  ROS due to  []mental status change  []sedated   []intubated          PREVIOUS WORKUP  IMAGING: none    LABS  Results for orders placed or performed in visit on 01/17/18   VITAMIN B12 & FOLATE   Result Value Ref Range    Vitamin B12 973 232 - 1245 pg/mL    Folate 16.4 >5.5 ng/mL   METABOLIC PANEL, COMPREHENSIVE   Result Value Ref Range    Glucose 96 65 - 99 mg/dL    BUN 13 8 - 27 mg/dL    Creatinine 0.77 0.57 - 1.00 mg/dL    GFR est non-AA 79 >59 mL/min/1.73    GFR est AA 91 >59 mL/min/1.73    BUN/Creatinine ratio 17 12 - 28    Sodium 146 (H) 134 - 144 mmol/L    Potassium 4.2 3.5 - 5.2 mmol/L    Chloride 102 96 - 106 mmol/L    CO2 27 18 - 29 mmol/L    Calcium 9.8 8.7 - 10.3 mg/dL    Protein, total 6.7 6.0 - 8.5 g/dL    Albumin 4.5 3.6 - 4.8 g/dL    GLOBULIN, TOTAL 2.2 1.5 - 4.5 g/dL    A-G Ratio 2.0 1.2 - 2.2    Bilirubin, total 0.3 0.0 - 1.2 mg/dL    Alk. phosphatase 63 39 - 117 IU/L    AST (SGOT) 21 0 - 40 IU/L    ALT (SGPT) 28 0 - 32 IU/L   VITAMIN D, 25 HYDROXY   Result Value Ref Range    VITAMIN D, 25-HYDROXY 38.9 30.0 - 100.0 ng/mL   TSH 3RD GENERATION   Result Value Ref Range    TSH 4.310 0.450 - 4.500 uIU/mL       PHYSICAL EXAM  Visit Vitals    /75    Pulse 77    LMP 03/15/1998    SpO2 98%     General:  Alert, cooperative, no distress. Head:  Normocephalic, without obvious abnormality, atraumatic. Eyes:  Conjunctivae/corneas clear. Pupils equal, round, reactive to light. Extraocular movements intact, VFF, NO papilledema   Lungs:  Heart:   Non labored breathing  Regular rate and rhythm, no carotid bruits   Abdomen:   Soft, non-distended   Extremities: Extremities normal, atraumatic, no cyanosis or edema. Pulses: 2+ and symmetric all extremities. Skin: Skin color, texture, turgor normal. No rashes or lesions.    Neurologic:  Gen: Attention normal             Language: naming, repetition, fluency normal             Memory: intact recent and remote memory  Cranial Nerves:  I: smell Not tested   II: visual fields Full to confrontation   II: pupils Equal, round, reactive to light   II: optic disc No papilledema   III,VII: ptosis none   III,IV,VI: extraocular muscles  Full ROM   V: mastication normal   V: facial light touch sensation  normal   VII: facial muscle function   symmetric   VIII: hearing symmetric   IX: soft palate elevation  normal   XI: trapezius strength  5/5   XI: sternocleidomastoid strength 5/5   XI: neck flexion strength  5/5   XII: tongue  midline     Motor: normal bulk and tone, no tremor              Strength: 5/5 all four extremities  Sensory: intact to LT, decreased pinprick on left   Coordination: FTN intact, Rhomberg negative  Gait: normal gait including tandem   Reflexes: 2+ throughout       [address] is a 69 y.o. female who presents for evaluation of left sided nerve pain. She reports recently this has increased in severity. She thinks this may be secondary to decreasing her Lexapro. Patient reports that in the past she did have evaluation for this but everything was normal.  She does have a history of cervical stenosis. We will check to see if there is been any progression in this. RECOMMENDATIONS  1. MRI of the cervical spine  2. Off of gabapentin due to side effects  3. We will try to increase Lexapro back to 20 mg daily  4. If increase in Lexapro does not help the symptoms, patient will start Lyrica. Samples of Lyrica 50 mg given to patient. Side effects discussed. 5.  Discussed potential etiology of patient's symptoms. May need to do further neuroimaging if above is negative. 6.  Pt has had a previous EMG    FU 3 months    Isaiah Abdalla MD    CC: Summer Humphries MD  Fax: [phone]    This note was created using voice recognition software. Despite editing, there may be syntax errors. This note will not be viewable in [address]

## 2018-02-05 ENCOUNTER — HOSPITAL ENCOUNTER (OUTPATIENT)
Dept: MRI IMAGING | Age: 68
Discharge: HOME | End: 2018-02-05
Attending: PSYCHIATRY & NEUROLOGY
Payer: MEDICARE

## 2018-02-05 DIAGNOSIS — R20.0 NUMBNESS AND TINGLING: ICD-10-CM

## 2018-02-05 DIAGNOSIS — M54.2 CERVICALGIA: ICD-10-CM

## 2018-02-05 DIAGNOSIS — R20.2 NUMBNESS AND TINGLING: ICD-10-CM

## 2018-02-05 PROCEDURE — 72141 MRI NECK SPINE W/O DYE: CPT

## 2018-02-07 ENCOUNTER — TELEPHONE (OUTPATIENT)
Dept: NEUROLOGY | Age: 68
End: 2018-02-07

## 2018-02-07 NOTE — PROGRESS NOTES
Please let patient know that she does have some areas of pinched nerves in her neck but not severe. I think we should continue with the plan to see how she does on the increased lexapro for now.  We can review the MRI when I see her at follow up

## 2018-02-07 NOTE — TELEPHONE ENCOUNTER
----- Message from Baljeet Kwong MD sent at 2/7/2018 12:51 PM EST -----  Please let patient know that she does have some areas of pinched nerves in her neck but not severe. I think we should continue with the plan to see how she does on the increased lexapro for now.  We can review the MRI when I see her at follow up

## 2018-02-20 ENCOUNTER — TELEPHONE (OUTPATIENT)
Dept: INTERNAL MEDICINE CLINIC | Age: 68
End: 2018-02-20

## 2018-02-20 NOTE — TELEPHONE ENCOUNTER
Pt is not able to speak at this time because she has lost her voice. Her  wants to speak to the nurse and his number is [phone].       thanks

## 2018-02-22 ENCOUNTER — HOSPITAL ENCOUNTER (OUTPATIENT)
Dept: LAB | Age: 68
Discharge: HOME | End: 2018-02-22
Payer: MEDICARE

## 2018-02-22 ENCOUNTER — OFFICE VISIT (OUTPATIENT)
Dept: INTERNAL MEDICINE CLINIC | Age: 68
End: 2018-02-22

## 2018-02-22 VITALS
HEART RATE: 94 BPM | RESPIRATION RATE: 12 BRPM | HEIGHT: 68 IN | WEIGHT: 175.12 LBS | OXYGEN SATURATION: 95 % | BODY MASS INDEX: 26.54 KG/M2 | DIASTOLIC BLOOD PRESSURE: 79 MMHG | SYSTOLIC BLOOD PRESSURE: 121 MMHG | TEMPERATURE: 99.5 F

## 2018-02-22 DIAGNOSIS — E86.0 DEHYDRATION, MODERATE: ICD-10-CM

## 2018-02-22 DIAGNOSIS — I10 ESSENTIAL HYPERTENSION: ICD-10-CM

## 2018-02-22 DIAGNOSIS — J04.0 LARYNGITIS: ICD-10-CM

## 2018-02-22 DIAGNOSIS — A09 TRAVELER'S DIARRHEA: ICD-10-CM

## 2018-02-22 DIAGNOSIS — A09 TRAVELER'S DIARRHEA: Primary | ICD-10-CM

## 2018-02-22 PROCEDURE — 87046 STOOL CULTR AEROBIC BACT EA: CPT

## 2018-02-22 PROCEDURE — 87209 SMEAR COMPLEX STAIN: CPT

## 2018-02-22 PROCEDURE — 89055 LEUKOCYTE ASSESSMENT FECAL: CPT

## 2018-02-22 RX ORDER — PROMETHAZINE HYDROCHLORIDE 25 MG/ML
25 INJECTION, SOLUTION INTRAMUSCULAR; INTRAVENOUS ONCE
Qty: 1 VIAL | Refills: 0
Start: 2018-02-22 | End: 2018-02-22

## 2018-02-22 RX ORDER — ONDANSETRON 4 MG/1
8 TABLET, ORALLY DISINTEGRATING ORAL
Qty: 30 TAB | Refills: 0 | Status: SHIPPED | OUTPATIENT
Start: 2018-02-22 | End: 2018-06-28 | Stop reason: ALTCHOICE

## 2018-02-22 RX ORDER — LEVOFLOXACIN 500 MG/1
500 TABLET, FILM COATED ORAL DAILY
Qty: 3 TAB | Refills: 0 | Status: SHIPPED | OUTPATIENT
Start: 2018-02-22 | End: 2018-06-28 | Stop reason: ALTCHOICE

## 2018-02-22 NOTE — PROGRESS NOTES
Chief Complaint   Patient presents with    Follow-up     Pt was seen in the ER Sunday for bad cough, sore throat and chest pain    Diarrhea     got worse over the last 2 days     Vomiting     got worse over the last 2 days      pharyngitis  Pt presents with . She has been ill for the past. Week. Per pt she and  went on cruise. The ship had to be disinfected for norovirus before boarding. They note there were initial precautions but as the trip went on there were less restrictions and people were touching everything. Pt reports c/o sore throat and losing her voice initially. She still has not regained her voice. She notes she has a fever at 99 as she does not run this high normally. She is coughing up brown, yellow and green sputum. She has some sob with walking but no wheezing. She was given a zpak but no relief with symptoms. She was normal prior to cruise when she got sx on the 10th day. Gastroenteritis  Pt also reports sx of loose diarhhea which started on the 10th day of the cruise. She has not been able to hold down fluids. She notes this was an acute onset and had to sleep the last 2 days. She went to the ER in [facility] when the cruiseship landed and was assessed. She had labs and overall negative. She had a slight increase in WBC and slight bandemia. Since the nataliia she had to stay in a hotel to try to recover for 2 days. She is having difficulty voiding and when she tries to drink water she has to sit on the toilet when drinking water because it immediately comes out. She took imodium yesterday with benefit but then today she took imodium but just not helping. She has nausea. Her last emesis was yesterday evening and last loose stool in afternoon. No blood in stool.  + left sided abdominal cramping. Nausea and diarrhea is severe and again not responsive to imodium. She feels lightheaded and dizzy on standing      [phone], 's number.  Pt left phone on cruise [place].    Subjective: Annmarie Neville is a 77 y.o. female with hypertension. Hypertension ROS: holding medications as instructed, no medication side effects noted, no TIA's, no chest pain on exertion, no dyspnea on exertion, no swelling of ankles. New concerns: none, she is not taking meds for now.        Past Medical History:   Diagnosis Date    Arthritis     Asthma     Elevated lipids 9/15/2015    Essential hypertension 9/15/2015    Hypothyroidism, adult 9/15/2015    Migraine     allergy mediated in sping and fall    Sun-damaged skin      Past Surgical History:   Procedure Laterality Date    HX BREAST BIOPSY Left yrs ago    neg; surgical bx    HX  SECTION      HX CHOLECYSTECTOMY      HX CYST INCISION AND DRAINAGE Left over years    neg    HX TONSILLECTOMY       Social History     Social History    Marital status:      Spouse name: N/A    Number of children: N/A    Years of education: N/A     Social History Main Topics    Smoking status: Never Smoker    Smokeless tobacco: Never Used    Alcohol use 3.5 oz/week     7 Glasses of wine per week    Drug use: No    Sexual activity: Yes     Partners: Male     Other Topics Concern    None     Social History Narrative    Retired from [address] [place] [address] in [place]    Stressful        Hx of situational stress     passed cancer duodenal    Mother was sick 79 yo with MS actually did well        Recently   In 2017        1 son, doing well     Family History   Problem Relation Age of Onset   [de-identified] MS Mother     Cancer Mother      uterus/skin cancer    Cancer Father 72     colon    Parkinson's Disease Sister 61    MS Brother     Parkinson's Disease Brother      Current Outpatient Prescriptions   Medication Sig Dispense Refill    escitalopram oxalate (LEXAPRO) 20 mg tablet TAKE 1 TABLET DAILY 90 Tab 2    SUMAtriptan (IMITREX) 100 mg tablet Take once daily as needed for migraine 12 Tab 5    lidocaine-prilocaine (EMLA) topical cream Apply  to affected area as needed for Pain. 25 g 0    losartan-hydroCHLOROthiazide (HYZAAR) 100-25 mg per tablet Take 1 Tab by mouth daily. 90 Tab 3    atorvastatin (LIPITOR) 40 mg tablet Take 1 Tab by mouth nightly. 90 Tab 3    montelukast (SINGULAIR) 10 mg tablet Take 1 Tab by mouth daily. 90 Tab 3    albuterol (PROAIR HFA) 90 mcg/actuation inhaler Take 2 Puffs by inhalation every four (4) hours as needed for Wheezing or Shortness of Breath. 2 Inhaler 3    levothyroxine (SYNTHROID) 50 mcg tablet Take 1 Tab by mouth Daily (before breakfast). 90 Tab 3    BREO ELLIPTA 200-25 mcg/dose inhaler       Omega-3-DHA-EPA-Fish Oil 1,000 mg (120 mg-180 mg) cap Take  by mouth.  multivitamin (ONE A DAY) tablet Take 1 Tab by mouth daily.  ascorbic acid (VITAMIN C) 500 mg tablet Take  by mouth.  pregabalin (LYRICA) 50 mg capsule Take 1 Cap by mouth two (2) times a day. Max Daily Amount: 100 mg. 42 Cap 0    azelastine (ASTELIN) 137 mcg (0.1 %) nasal spray       zolpidem (AMBIEN) 5 mg tablet Take 1 Tab by mouth nightly as needed for Sleep. Max Daily Amount: 5 mg. Indications: SLEEP-ONSET INSOMNIA 30 Tab 0    fluticasone (FLONASE) 50 mcg/actuation nasal spray 2 Sprays by Both Nostrils route daily.        Allergies   Allergen Reactions    Sulfa (Sulfonamide Antibiotics) Anaphylaxis       Review of Systems - General ROS: positive for  - chills, fatigue, hot flashes, malaise, sleep disturbance and weight loss  negative for - weight gain  Cardiovascular ROS: no chest pain or dyspnea on exertion  Respiratory ROS: no cough, shortness of breath, or wheezing    Visit Vitals    /79 (BP 1 Location: Left arm, BP Patient Position: Sitting)    Pulse 94    Temp 99.5 °F (37.5 °C) (Oral)    Resp 12    Ht 5' 8\" (1.727 m)    Wt 175 lb 1.9 oz (79.4 kg)    LMP 03/15/1998    SpO2 95%    BMI 26.63 kg/m2     General Appearance:  Well developed, well nourished,alert and oriented x 3, and individual in mild distress. Ears/Nose/Mouth/Throat:   Hearing grossly normal.         Neck: Supple, no lad, no bruits   Chest:   Lungs clear to auscultation bilaterally. Cardiovascular:  Regular rate and rhythm, S1, S2 normal, no murmur. Abdomen:   Soft, non-tender, bowel sounds are active, no rebound   Extremities: No edema bilaterally. Skin: Warm and dry, no suspicious lesions                 Diagnoses and all orders for this visit:    1. Traveler's diarrhea  disussed with  and patient. Conservative care. Discussed will need stabilization so discussed ER for hydration vs IM phenergan and as needed zofran. Pt able to stand and get to BR but c/o lightheaedness. She has ecent travel suspicious for norovirus but also in [place] as well so ecoli potential pathogen as well. -     WBC, STOOL  -     OVA & PARASITES, STOOL  -     CAMPLYLOBACTER CULTURE; Future  -     CULTURE, STOOL, VIBRIO ONLY; Future  -     ondansetron (ZOFRAN ODT) 4 mg disintegrating tablet; Take 2 Tabs by mouth every eight (8) hours as needed for Nausea. , sed  -     promethazine (PHENERGAN) 25 mg/mL injection; 1 mL by IntraMUSCular route once for 1 dose. Indications: Nausea and Vomiting monitored no se  -     PROMETHAZINE HCL INJECTION  -     CA THER/PROPH/DIAG INJECTION, SUBCUT/IM  -     levoFLOXacin (LEVAQUIN) 500 mg tablet; Take 1 Tab by mouth daily. , sed    2. Laryngitis  I think this is viral.  Fluids rest    3. Essential hypertension  Cont to hold bp medication during gastroenteritis    4. Dehydration, moderate  Discussed ivf vs oral  Pt and  elect for trial pheneragan and zofran and ini and cant hold fluids an sx worse to ER. I spent 40 min with this patient and >50% of the time was spent on counseling and management of gastroenteritis viral vs. Bacterial. Discussed importance of drinking fluids at home and use of oral antiemetic. Sed re: meds. Discussed levoquin may need given travel to [place] and sx are progressing. I think her laryngitis is viral.          This note will not be viewable in MyChart. Follow up call to pt at 9 pm reveals pt able to drink some chicken broth and ate banana/apple. Discussed clear liquids and advancing to BRat and solids as tolerated.

## 2018-02-22 NOTE — PATIENT INSTRUCTIONS
Traveler's Diarrhea: Care Instructions  Your Care Instructions    Traveler's diarrhea is loose, watery bowel movements you can get when you travel. It also can cause vomiting and belly cramps. This kind of diarrhea is usually caused by bacteria. But sometimes it is caused by a parasite or virus. Most people get it when they eat undercooked, raw, or contaminated foods. You can also get it if you drink contaminated water or if you drink something that has contaminated ice cubes in it. In some cases, new foods can cause diarrhea. In other cases, the stress and anxiety of travel can cause it. Traveler's diarrhea usually isn't serious. Most of the time, bowel movements return to normal quickly. The most important thing is to prevent dehydration. Make sure to drink a lot of fluids. Follow-up care is a key part of your treatment and safety. Be sure to make and go to all appointments, and call your doctor if you are having problems. It's also a good idea to know your test results and keep a list of the medicines you take. How can you care for yourself at home? · Watch for signs of dehydration. This means your body has lost too much water. Dehydration is serious and needs to be treated right away. Signs of dehydration are:  ¨ Feeling more thirsty than usual.  ¨ Dry eyes and mouth. ¨ Feeling faint or lightheaded. ¨ Darker urine, and a smaller amount of urine than normal.  · To prevent dehydration, drink plenty of fluids, enough so that your urine is light yellow or clear like water. Choose water and other caffeine-free clear liquids until you feel better. If you have kidney, heart, or liver disease and have to limit fluids, talk with your doctor before you increase the amount of fluids you drink. · Start to eat small amounts of mild foods the next day, if you feel like it. ¨ Avoid spicy foods, fruits, alcohol, and caffeine until 48 hours after all symptoms go away.   ¨ Avoid chewing gum that has sorbitol. ¨ Try yogurt that has live cultures of Lactobacillus. You can check the label for this. Avoid other dairy products while you have diarrhea and for 3 days after symptoms go away. · Your doctor may recommend an over-the-counter medicine. These may include bismuth subsalicylate (Pepto-Bismol) or loperamide (Imodium). Read and follow all instructions on the label. Do not use these medicines if your doctor does not recommend them. · Be safe with medicines. If your doctor recommends prescription medicine, take it as prescribed. Call your doctor if you think you are having a problem with your medicine. You will get more details on the specific medicines your doctor prescribes. · If your doctor prescribes antibiotics, take them as directed. Do not stop taking them just because you feel better. You need to take the full course of antibiotics. When should you call for help? Call 911 anytime you think you may need emergency care. For example, call if:  ? · You passed out (lost consciousness). ? · Your stools are maroon or very bloody. ?Call your doctor now or seek immediate medical care if:  ? · You are dizzy or lightheaded, or you feel like you may faint. ? · Your stools are black and look like tar, or they have streaks of blood. ? · You have diarrhea and your belly pain or cramps are worse. ? · You have signs of needing more fluids. You have sunken eyes, a dry mouth, and pass only a little dark urine. ? Watch closely for changes in your health, and be sure to contact your doctor if:  ? · You have 12 or more loose stools in 24 hours. ? · You see pus in the diarrhea. ? · You have a new or higher fever. ? · Your diarrhea does not get better or is more frequent. Where can you learn more? Go to http://charan-kamala.info/. Enter L368 in the search box to learn more about \"Traveler's Diarrhea: Care Instructions. \"  Current as of: July 29, 2016  Content Version: 11.4  © 2878-0512 [employer]. Care instructions adapted under license by [employer] (which disclaims liability or warranty for this information). If you have questions about a medical condition or this instruction, always ask your healthcare professional. [place] [address] any warranty or liability for your use of this information.

## 2018-02-22 NOTE — MR AVS SNAPSHOT
[address] [facility] 
 
 
 [address] [place] [address] [facility] 
[phone] Patient: Arnie Fried MRN: [id number] ABM:5/8/2103 Visit Information Date & Time Provider Department Dept. Phone Encounter #  
 2/22/2018  8:00 AM Nic Motta MD [facility] [address] [id number] Your Appointments 3/29/2018 11:40 AM  
Follow Up with Uvaldo Villa MD  
[facility]) Appt Note: Nerve pain [place] [address] [address] [phone]  
  
   
 [place] [address] [id number] [address] Upcoming Health Maintenance Date Due  
 GLAUCOMA SCREENING Q2Y 8/1/2017 Influenza Age 5 to Adult 8/1/2017 FOBT Q 1 YEAR AGE 50-75 9/27/2017 MEDICARE YEARLY EXAM 4/27/2018 BREAST CANCER SCRN MAMMOGRAM 5/23/2019 DTaP/Tdap/Td series (2 - Td) 9/27/2026 Allergies as of 2/22/2018  Review Complete On: 2/22/2018 By: Nic Motta MD  
  
 Severity Noted Reaction Type Reactions Sulfa (Sulfonamide Antibiotics) High 03/15/2015    Anaphylaxis Current Immunizations  Reviewed on 1/31/2017 Name Date Influenza Vaccine 9/15/2015 Pneumococcal Conjugate (PCV-13) 10/4/2016 Not reviewed this visit You Were Diagnosed With   
  
 Codes Comments Traveler's diarrhea    -  Primary ICD-10-CM: I88 ICD-9-CM: 223. 2 Cough     ICD-10-CM: R05 ICD-9-CM: 484. 2 Vitals BP Pulse Temp Resp Height(growth percentile) Weight(growth percentile) 121/79 (BP 1 Location: Left arm, BP Patient Position: Sitting) 94 99.5 °F (37.5 °C) (Oral) 12 5' 8\" (1.727 m) 175 lb 1.9 oz (79.4 kg) LMP SpO2 BMI OB Status Smoking Status 03/15/1998 95% 26.63 kg/m2 Postmenopausal Never Smoker Vitals History BMI and BSA Data  Body Mass Index Body Surface Area  
 26.63 kg/m 2 1.95 m 2  
  
  
 Preferred Pharmacy Pharmacy Name Phone [facility], [address] [phone] Your Updated Medication List  
  
   
This list is accurate as of 2/22/18  8:59 AM.  Always use your most recent med list.  
  
  
  
  
 albuterol 90 mcg/actuation inhaler Commonly known as:  PROAIR HFA Take 2 Puffs by inhalation every four (4) hours as needed for Wheezing or Shortness of Breath. atorvastatin 40 mg tablet Commonly known as:  LIPITOR Take 1 Tab by mouth nightly. azelastine 137 mcg (0.1 %) nasal spray Commonly known as:  ASTELIN  
  
 BREO ELLIPTA 200-25 mcg/dose inhaler Generic drug:  fluticasone-vilanterol  
  
 escitalopram oxalate 20 mg tablet Commonly known as:  Arthur Daisy TAKE 1 TABLET DAILY  
  
 FLONASE 50 mcg/actuation nasal spray Generic drug:  fluticasone 2 Sprays by Both Nostrils route daily. levoFLOXacin 500 mg tablet Commonly known as:  Marrion Jacqueline Take 1 Tab by mouth daily. levothyroxine 50 mcg tablet Commonly known as:  synthroid Take 1 Tab by mouth Daily (before breakfast). lidocaine-prilocaine topical cream  
Commonly known as:  EMLA Apply  to affected area as needed for Pain.  
  
 losartan-hydroCHLOROthiazide 100-25 mg per tablet Commonly known as:  HYZAAR Take 1 Tab by mouth daily. montelukast 10 mg tablet Commonly known as:  SINGULAIR Take 1 Tab by mouth daily. multivitamin tablet Commonly known as:  ONE A DAY Take 1 Tab by mouth daily. Omega-3-DHA-EPA-Fish Oil 1,000 mg (120 mg-180 mg) Cap Take  by mouth. ondansetron 4 mg disintegrating tablet Commonly known as:  ZOFRAN ODT Take 2 Tabs by mouth every eight (8) hours as needed for Nausea. pregabalin 50 mg capsule Commonly known as:  Rebecca Speed Take 1 Cap by mouth two (2) times a day. Max Daily Amount: 100 mg.  
  
 promethazine 25 mg/mL injection Commonly known as:  PHENERGAN  
1 mL by IntraMUSCular route once for 1 dose. Indications: Nausea and Vomiting SUMAtriptan 100 mg tablet Commonly known as:  IMITREX Take once daily as needed for migraine VITAMIN C 500 mg tablet Generic drug:  ascorbic acid (vitamin C) Take  by mouth.  
  
 zolpidem 5 mg tablet Commonly known as:  AMBIEN Take 1 Tab by mouth nightly as needed for Sleep. Max Daily Amount: 5 mg. Indications: SLEEP-ONSET INSOMNIA Prescriptions Sent to Pharmacy Refills  
 ondansetron (ZOFRAN ODT) 4 mg disintegrating tablet 0 Sig: Take 2 Tabs by mouth every eight (8) hours as needed for Nausea. Class: Normal  
 Pharmacy: [place] [address], [address] Ph #: [phone] Route: Oral  
 levoFLOXacin (LEVAQUIN) 500 mg tablet 0 Sig: Take 1 Tab by mouth daily. Class: Normal  
 Pharmacy: [place] [address], [address] [facility] [address] Ph #: [phone] Route: Oral  
  
We Performed the Following OVA & PARASITES, STOOL [id number] CPT(R)] PA THER/PROPH/DIAG INJECTION, SUBCUT/IM [id number] CPT(R)] PROMETHAZINE HCL INJECTION [ [facility]] WBC, STOOL [33847 CPT(R)] To-Do List   
 02/22/2018 Microbiology:  [address] CULTURE   
  
 02/22/2018 Microbiology:  CULTURE, STOOL, VIBRIO ONLY Patient Instructions Traveler's Diarrhea: Care Instructions Your Care Instructions Traveler's diarrhea is loose, watery bowel movements you can get when you travel. It also can cause vomiting and belly cramps. This kind of diarrhea is usually caused by bacteria. But sometimes it is caused by a parasite or virus. Most people get it when they eat undercooked, raw, or contaminated foods. You can also get it if you drink contaminated water or if you drink something that has contaminated ice cubes in it. In some cases, new foods can cause diarrhea. In other cases, the stress and anxiety of travel can cause it. Traveler's diarrhea usually isn't serious. Most of the time, bowel movements return to normal quickly. The most important thing is to prevent dehydration. Make sure to drink a lot of fluids. Follow-up care is a key part of your treatment and safety. Be sure to make and go to all appointments, and call your doctor if you are having problems. It's also a good idea to know your test results and keep a list of the medicines you take. How can you care for yourself at home? · Watch for signs of dehydration. This means your body has lost too much water. Dehydration is serious and needs to be treated right away. Signs of dehydration are: ¨ Feeling more thirsty than usual. 
¨ Dry eyes and mouth. ¨ Feeling faint or lightheaded. ¨ Darker urine, and a smaller amount of urine than normal. 
· To prevent dehydration, drink plenty of fluids, enough so that your urine is light yellow or clear like water. Choose water and other caffeine-free clear liquids until you feel better. If you have kidney, heart, or liver disease and have to limit fluids, talk with your doctor before you increase the amount of fluids you drink. · Start to eat small amounts of mild foods the next day, if you feel like it. ¨ Avoid spicy foods, fruits, alcohol, and caffeine until 48 hours after all symptoms go away. ¨ Avoid chewing gum that has sorbitol. ¨ Try yogurt that has live cultures of Lactobacillus. You can check the label for this. Avoid other dairy products while you have diarrhea and for 3 days after symptoms go away. · Your doctor may recommend an over-the-counter medicine. These may include bismuth subsalicylate (Pepto-Bismol) or loperamide (Imodium). Read and follow all instructions on the label. Do not use these medicines if your doctor does not recommend them. · Be safe with medicines. If your doctor recommends prescription medicine, take it as prescribed. Call your doctor if you think you are having a problem with your medicine. You will get more details on the specific medicines your doctor prescribes. · If your doctor prescribes antibiotics, take them as directed. Do not stop taking them just because you feel better. You need to take the full course of antibiotics. When should you call for help? Call 911 anytime you think you may need emergency care. For example, call if: 
? · You passed out (lost consciousness). ? · Your stools are maroon or very bloody. ?Call your doctor now or seek immediate medical care if: 
? · You are dizzy or lightheaded, or you feel like you may faint. ? · Your stools are black and look like tar, or they have streaks of blood. ? · You have diarrhea and your belly pain or cramps are worse. ? · You have signs of needing more fluids. You have sunken eyes, a dry mouth, and pass only a little dark urine. ? Watch closely for changes in your health, and be sure to contact your doctor if: 
? · You have 12 or more loose stools in 24 hours. ? · You see pus in the diarrhea. ? · You have a new or higher fever. ? · Your diarrhea does not get better or is more frequent. Where can you learn more? Go to http://charan-kamala.info/. Enter L368 in the search box to learn more about \"Traveler's Diarrhea: Care Instructions. \" Current as of: July 29, 2016 Content Version: 11.4 © 6436-6771 [employer]. Care instructions adapted under license by [employer] (which disclaims liability or warranty for this information). If you have questions about a medical condition or this instruction, always ask your healthcare professional. [place] [address] any warranty or liability for your use of this information. Introducing [facility]! Dear Raman Flatter: Thank you for requesting a [employer] account. Our records indicate that you have previously registered for a [employer] account but its currently inactive. Please call our [employer] support line at [phone]. Additional Information If you have questions, please visit the Frequently Asked Questions section of the [employer] website at [email or web address] Remember, [employer] is NOT to be used for urgent needs. For medical emergencies, dial 911. Now available from your iPhone and Android! Please provide this summary of care documentation to your next provider. Your primary care clinician is listed as Verna Mancuso. If you have any questions after today's visit, please call [phone].

## 2018-03-03 LAB
CAMPYLOBACTER STL CULT: NORMAL
O+P SPEC MICRO: NORMAL
VIBRIO STL CULT: NORMAL
WBC STL QL MICRO: NORMAL

## 2018-03-30 ENCOUNTER — OFFICE VISIT (OUTPATIENT)
Dept: NEUROLOGY | Age: 68
End: 2018-03-30

## 2018-03-30 VITALS
HEIGHT: 68 IN | DIASTOLIC BLOOD PRESSURE: 68 MMHG | WEIGHT: 175 LBS | BODY MASS INDEX: 26.52 KG/M2 | SYSTOLIC BLOOD PRESSURE: 128 MMHG

## 2018-03-30 DIAGNOSIS — R20.2 NUMBNESS AND TINGLING: ICD-10-CM

## 2018-03-30 DIAGNOSIS — R20.0 NUMBNESS AND TINGLING: ICD-10-CM

## 2018-03-30 DIAGNOSIS — M50.90 CERVICAL DISC DISEASE: Primary | ICD-10-CM

## 2018-03-30 NOTE — LETTER
Reviewed record in preparation for visit and have necessary documentation Pt did not bring medication to office visit for review Medication list reviewed and reconciled with patient Information was given to pt on Advanced Directives, Living Will 
opportunity was given for questions Neurology Progress Note Patient ID: Cherise Barry [id number] 
77 y.o. 
1950 HISTORY PROVIDED BY: 
Patient Chief Complaint: Left sided nerve pain Subjective:  
 Ms. Parvez Portillo is here for follow up today of left sided nerve pain. Since last visit she did have an MRI of the C spine. This did show some degenerative disc disease with some neuroforaminal narrowing. Patient reports her pain does start midthoracic and then can go up or down. She had decreased her Lexapro prior to our first visit and thought this may be related. She has subsequently gone back to her previous dose and the symptoms did not resolve. She had tried gabapentin but had severe headaches for this. She is off of that at this time. We did give her prescription for Lyrica to try, but she read side effects and was concerned and did not take this either. Patient is still having some issues with headaches. Patient has not had a recent EMG/NCS, but has had one prior, so she does not want to go through this most necessary. She has not tried physical therapy and would like to try this at this point. Recap: Cherise Barry is a 69 y.o. female who I am asked to see in consultation for left sided nerve pain. She reports the pain starts in her side in the midthoracic area and goes down the left and up the head. She has had that off and on for years. This mostly flares when she gets really tired. However the last month it happens every day. She was on lexapro 20 and cut this to 10mg. She decreased because she was stable in terms of mood.  
She did try gabapentin and this stopped the sensation but gave her severe headaches. She got up to 300mg qhs. The headaches were severe with vomiting. Last night was her first night off the gabapentin. She does have a migraine history. She had an ultrasound last week that showed no change in her cyst that she has. She had an EMG/NCS about 10 years ago of her legs and arm and this was negative. No weakness on the left side. No relieving factors for this. She has to take a sleeping pill to sleep. The sensation is tingling and scratching that is constant discomfort. She feels like it can make her breathless. She doesn't recall neuroimaging but she thinks she does have cervical stenosis. She was doing well in terms of migraines until trying the gabapentin. She had been headache for over 2 years. She does have cervical neck pain. She recently retired here several years ago from [place]. She has been enjoying her [employer] has been staying very active. Objective:  
ROS: 
Per HPI- 
Otherwise 12 point ROS was negative Meds: 
Current Outpatient Prescriptions on File Prior to Visit Medication Sig Dispense Refill  escitalopram oxalate (LEXAPRO) 20 mg tablet TAKE 1 TABLET DAILY 90 Tab 2  
 SUMAtriptan (IMITREX) 100 mg tablet Take once daily as needed for migraine 12 Tab 5  lidocaine-prilocaine (EMLA) topical cream Apply  to affected area as needed for Pain. 25 g 0  
 losartan-hydroCHLOROthiazide (HYZAAR) 100-25 mg per tablet Take 1 Tab by mouth daily. 90 Tab 3  
 atorvastatin (LIPITOR) 40 mg tablet Take 1 Tab by mouth nightly. 90 Tab 3  
 montelukast (SINGULAIR) 10 mg tablet Take 1 Tab by mouth daily. 90 Tab 3  
 albuterol (PROAIR HFA) 90 mcg/actuation inhaler Take 2 Puffs by inhalation every four (4) hours as needed for Wheezing or Shortness of Breath. 2 Inhaler 3  
 levothyroxine (SYNTHROID) 50 mcg tablet Take 1 Tab by mouth Daily (before breakfast).  90 Tab 3  
 zolpidem (AMBIEN) 5 mg tablet Take 1 Tab by mouth nightly as needed for Sleep. Max Daily Amount: 5 mg. Indications: SLEEP-ONSET INSOMNIA 30 Tab 0  
 ondansetron (ZOFRAN ODT) 4 mg disintegrating tablet Take 2 Tabs by mouth every eight (8) hours as needed for Nausea. 30 Tab 0  
 levoFLOXacin (LEVAQUIN) 500 mg tablet Take 1 Tab by mouth daily. 3 Tab 0  pregabalin (LYRICA) 50 mg capsule Take 1 Cap by mouth two (2) times a day. Max Daily Amount: 100 mg. 42 Cap 0  
 BREO ELLIPTA 200-25 mcg/dose inhaler  azelastine (ASTELIN) 137 mcg (0.1 %) nasal spray  fluticasone (FLONASE) 50 mcg/actuation nasal spray 2 Sprays by Both Nostrils route daily.  Omega-3-DHA-EPA-Fish Oil 1,000 mg (120 mg-180 mg) cap Take  by mouth.  multivitamin (ONE A DAY) tablet Take 1 Tab by mouth daily.  ascorbic acid (VITAMIN C) 500 mg tablet Take  by mouth. No current facility-administered medications on file prior to visit. Imaging: MRI cervical spine: Multilevel degenerative disc disease and degenerative changes, worst at the 
C4-C5 and C5-C6 levels. Severe right neuroforaminal narrowing at C5-C6. Mild 
spinal canal stenosis at C4-C5 and C5-C6. Reviewed records in [facility] and [facility] tab today Lab Review Results for orders placed or performed in visit on 02/22/18 OVA & PARASITES, STOOL Result Value Ref Range Ova & Parasite exam    
  No ova, cysts, or parasites seen. . 
One negative specimen does not rule out the possibility of a 
parasitic infection. CAMPLYLOBACTER CULTURE Result Value Ref Range Campylobacter Culture No Campylobacter species isolated. CULTURE, STOOL, VIBRIO ONLY Result Value Ref Range Stool Culture, Vibrio No Vibrio isolated. WBC, STOOL Result Value Ref Range White blood cells, stool No white blood cells seen. None Seen Exam: 
Visit Vitals  /68  Ht 5' 8\" (1.727 m)  Wt 79.4 kg (175 lb)  BMI 26.61 kg/m2 Gen: Well developed CV: RRR 
 Lungs: non labored breathing Abd: non distending Neuro: A&O x 3, no dysarthria or aphasia CN II-XII: PERRL, EOMI, face symmetric, tongue/palate midline Motor: strength 5/5 all four ext Sensory: intact to LT Gait: normal 
 
Assessment: Asuncion Kelly is a 77 y.o. female who presents for follow up of left sided nerve pain. She did have an MRI of the cervical spine that showed some progression of her cervical stenosis with some neuroforaminal narrowing. I do suspect she has disks in her thoracic spine as well. Patient would like to try physical therapy this if she can strengthen her spine and improve her symptoms. If physical therapy does not help, we will proceed with EMG/NCS for further evaluation. Patient is not interested in any surgical intervention at this point in time. She does not want to take any neuropathic pain medications, but if she does she would like to retry the gabapentin. Plan: 1. MRI of the cervical spine as above 2. Off of gabapentin due to side effects. Patient had a significant headache with it. She is considering retrying it 3. Continue Lexapro 20 mg daily 4. Patient does not want to do Lyrica due to concern for side effects 5. Discussed potential etiology of patient's symptoms. Will do a referral to physical therapy 6. Pt has had a previous EMG. Will only repeat if she does not improve with physical therapy or we need to consider surgery FU 3 months Signed: 
Quan Bo MD 
3/31/2018 This note was created using voice recognition software. Despite editing, there may be syntax errors. This note will not be viewable in [address]

## 2018-03-30 NOTE — MR AVS SNAPSHOT
[address] [facility] [address] [facility] [id number] [phone] Patient: Cherise Barry MRN: [id number] Visit Information Date & Time Provider Department Dept. Phone Encounter #  
 3/30/2018  8:40 AM Linda Mccallum MD [facility] Neurology [facility] [phone] [id number] Upcoming Health Maintenance Date Due  
 GLAUCOMA SCREENING Q2Y 8/1/2017 Influenza Age 5 to Adult 8/1/2017 FOBT Q 1 YEAR AGE 50-75 9/27/2017 MEDICARE YEARLY EXAM 4/27/2018 BREAST CANCER SCRN MAMMOGRAM 5/23/2019 DTaP/Tdap/Td series (2 - Td) 9/27/2026 Allergies as of 3/30/2018  Review Complete On: 2/22/2018 By: Genna Gregory MD  
  
 Severity Noted Reaction Type Reactions Sulfa (Sulfonamide Antibiotics) High 03/15/2015    Anaphylaxis Current Immunizations  Reviewed on 1/31/2017 Name Date Influenza Vaccine 9/15/2015 Pneumococcal Conjugate (PCV-13) 10/4/2016 Not reviewed this visit You Were Diagnosed With   
  
 Codes Comments Cervical disc disease    -  Primary ICD-10-CM: M50.90 ICD-9-CM: 722.91 Vitals BP Height(growth percentile) Weight(growth percentile) LMP BMI OB Status 128/68 5' 8\" (1.727 m) 175 lb (79.4 kg) 03/15/1998 26.61 kg/m2 Postmenopausal  
 Smoking Status Never Smoker Vitals History BMI and BSA Data Body Mass Index Body Surface Area  
 26.61 kg/m 2 1.95 m 2 Preferred Pharmacy Pharmacy Name Phone [facility] Cecelia Turner [address], [address] [phone] Your Updated Medication List  
  
   
This list is accurate as of 3/30/18  9:09 AM.  Always use your most recent med list.  
  
  
  
  
 albuterol 90 mcg/actuation inhaler Commonly known as:  PROAIR HFA Take 2 Puffs by inhalation every four (4) hours as needed for Wheezing or Shortness of Breath. atorvastatin 40 mg tablet Commonly known as:  LIPITOR Take 1 Tab by mouth nightly. azelastine 137 mcg (0.1 %) nasal spray Commonly known as:  ASTELIN  
  
 BREO ELLIPTA 200-25 mcg/dose inhaler Generic drug:  fluticasone-vilanterol  
  
 escitalopram oxalate 20 mg tablet Commonly known as:  Karlene Humphrey TAKE 1 TABLET DAILY  
  
 FLONASE 50 mcg/actuation nasal spray Generic drug:  fluticasone 2 Sprays by Both Nostrils route daily. levoFLOXacin 500 mg tablet Commonly known as:  Floy Silvius Take 1 Tab by mouth daily. levothyroxine 50 mcg tablet Commonly known as:  synthroid Take 1 Tab by mouth Daily (before breakfast). lidocaine-prilocaine topical cream  
Commonly known as:  EMLA Apply  to affected area as needed for Pain.  
  
 losartan-hydroCHLOROthiazide 100-25 mg per tablet Commonly known as:  HYZAAR Take 1 Tab by mouth daily. montelukast 10 mg tablet Commonly known as:  SINGULAIR Take 1 Tab by mouth daily. multivitamin tablet Commonly known as:  ONE A DAY Take 1 Tab by mouth daily. omega 3-DHA-EPA-fish oil 1,000 mg (120 mg-180 mg) capsule Take  by mouth. ondansetron 4 mg disintegrating tablet Commonly known as:  ZOFRAN ODT Take 2 Tabs by mouth every eight (8) hours as needed for Nausea. pregabalin 50 mg capsule Commonly known as:  Anais Barters Take 1 Cap by mouth two (2) times a day. Max Daily Amount: 100 mg. SUMAtriptan 100 mg tablet Commonly known as:  IMITREX Take once daily as needed for migraine VITAMIN C 500 mg tablet Generic drug:  ascorbic acid (vitamin C) Take  by mouth.  
  
 zolpidem 5 mg tablet Commonly known as:  AMBIEN Take 1 Tab by mouth nightly as needed for Sleep. Max Daily Amount: 5 mg. Indications: SLEEP-ONSET INSOMNIA We Performed the Following REFERRAL TO PHYSICAL THERAPY [VEV40 Custom] Comments:  
 Abbey Silva PT for cervical stenosis treatment Referral Information Referral ID Referred By Referred To  
  
 [id number] Jim Speak Not Available Visits Status Start Date End Date 1 New Request 3/30/18 3/30/19 If your referral has a status of pending review or denied, additional information will be sent to support the outcome of this decision. Patient Instructions PRESCRIPTION REFILL POLICY [facility] Statement to Patients April 1, 2014 In an effort to ensure the large volume of patient prescription refills is processed in the most efficient and expeditious manner, we are asking our patients to assist us by calling your Pharmacy for all prescription refills, this will include also your  Mail Order Pharmacy. The pharmacy will contact our office electronically to continue the refill process. Please do not wait until the last minute to call your pharmacy. We need at least 48 hours (2days) to fill prescriptions. We also encourage you to call your pharmacy before going to  your prescription to make sure it is ready. With regard to controlled substance prescription refill requests (narcotic refills) that need to be picked up at our office, we ask your cooperation by providing us with at least 72 hours (3days) notice that you will need a refill. We will not refill narcotic prescription refill requests after 4:00pm on any weekday, Monday through Thursday, or after 2:00pm on Fridays, or on the weekends. We encourage everyone to explore another way of getting your prescription refill request processed using [employer], our patient web portal through our electronic medical record system. [employer] is an efficient and effective way to communicate your medication request directly to the office and  downloadable as an daisha on your smart phone .  Green[employer] also features a review functionality that allows you to view your medication list as well as leave messages for your physician. Are you ready to get connected? If so please review the attatched instructions or speak to any of our staff to get you set up right away! Thank you so much for your cooperation. Should you have any questions please contact our Practice Administrator. The Physicians and Staff,  [facility] Advance Directives: Care Instructions Your Care Instructions An advance directive is a legal way to state your wishes at the end of your life. It tells your family and your doctor what to do if you can no longer say what you want. There are two main types of advance directives. You can change them any time that your wishes change. · A living will tells your family and your doctor your wishes about life support and other treatment. · A durable power of  for health care lets you name a person to make treatment decisions for you when you can't speak for yourself. This person is called a health care agent. If you do not have an advance directive, decisions about your medical care may be made by a doctor or a  who doesn't know you. It may help to think of an advance directive as a gift to the people who care for you. If you have one, they won't have to make tough decisions by themselves. Follow-up care is a key part of your treatment and safety. Be sure to make and go to all appointments, and call your doctor if you are having problems. It's also a good idea to know your test results and keep a list of the medicines you take. How can you care for yourself at home? · Discuss your wishes with your loved ones and your doctor. This way, there are no surprises. · Many states have a unique form. Or you might use a universal form that has been approved by many states. This kind of form can sometimes be completed and stored online. Your electronic copy will then be available wherever you have a connection to the Internet.  In most cases, doctors will respect your wishes even if you have a form from a different state. · You don't need a  to do an advance directive. But you may want to get legal advice. · Think about these questions when you prepare an advance directive: ¨ Who do you want to make decisions about your medical care if you are not able to? Many people choose a family member or close friend. ¨ Do you know enough about life support methods that might be used? If not, talk to your doctor so you understand. ¨ What are you most afraid of that might happen? You might be afraid of having pain, losing your independence, or being kept alive by machines. ¨ Where would you prefer to die? Choices include your home, a hospital, or a nursing home. ¨ Would you like to have information about hospice care to support you and your family? ¨ Do you want to donate organs when you die? ¨ Do you want certain Mandaen practices performed before you die? If so, put your wishes in the advance directive. · Read your advance directive every year, and make changes as needed. When should you call for help? Be sure to contact your doctor if you have any questions. Where can you learn more? Go to http://charan-kamala.info/. Enter R264 in the search box to learn more about \"Advance Directives: Care Instructions. \" Current as of: September 24, 2016 Content Version: 11.4 © 3088-6976 Healthwise, Incorporated. Care instructions adapted under license by [employer] (which disclaims liability or warranty for this information). If you have questions about a medical condition or this instruction, always ask your healthcare professional. [place] [address] any warranty or liability for your use of this information. Advance Directives: Care Instructions Your Care Instructions An advance directive is a legal way to state your wishes at the end of your life. It tells your family and your doctor what to do if you can no longer say what you want. There are two main types of advance directives. You can change them any time that your wishes change. · A living will tells your family and your doctor your wishes about life support and other treatment. · A durable power of  for health care lets you name a person to make treatment decisions for you when you can't speak for yourself. This person is called a health care agent. If you do not have an advance directive, decisions about your medical care may be made by a doctor or a  who doesn't know you. It may help to think of an advance directive as a gift to the people who care for you. If you have one, they won't have to make tough decisions by themselves. Follow-up care is a key part of your treatment and safety. Be sure to make and go to all appointments, and call your doctor if you are having problems. It's also a good idea to know your test results and keep a list of the medicines you take. How can you care for yourself at home? · Discuss your wishes with your loved ones and your doctor. This way, there are no surprises. · Many states have a unique form. Or you might use a universal form that has been approved by many states. This kind of form can sometimes be completed and stored online. Your electronic copy will then be available wherever you have a connection to the Internet. In most cases, doctors will respect your wishes even if you have a form from a different state. · You don't need a  to do an advance directive. But you may want to get legal advice. · Think about these questions when you prepare an advance directive: ¨ Who do you want to make decisions about your medical care if you are not able to? Many people choose a family member or close friend. ¨ Do you know enough about life support methods that might be used? If not, talk to your doctor so you understand. ¨ What are you most afraid of that might happen? You might be afraid of having pain, losing your independence, or being kept alive by machines. ¨ Where would you prefer to die? Choices include your home, a hospital, or a nursing home. ¨ Would you like to have information about hospice care to support you and your family? ¨ Do you want to donate organs when you die? ¨ Do you want certain Mormon practices performed before you die? If so, put your wishes in the advance directive. · Read your advance directive every year, and make changes as needed. When should you call for help? Be sure to contact your doctor if you have any questions. Where can you learn more? Go to http://charan-kamala.info/. Enter R264 in the search box to learn more about \"Advance Directives: Care Instructions. \" Current as of: September 24, 2016 Content Version: 11.4 © 1571-9959 [employer]. Care instructions adapted under license by [employer] (which disclaims liability or warranty for this information). If you have questions about a medical condition or this instruction, always ask your healthcare professional. [place] [address] any warranty or liability for your use of this information. Introducing [facility]! Dear Polly Ludwig: 
Thank you for requesting a 8hands account. Our records indicate that you already have an active 8hands account. You can access your account anytime at [email or web address] Did you know that you can access your hospital and ER discharge instructions at any time in 8hands? You can also review all of your test results from your hospital stay or ER visit. Additional Information If you have questions, please visit the Frequently Asked Questions section of the 8hands website at [email or web address] Remember, 8hands is NOT to be used for urgent needs. For medical emergencies, dial 911. Now available from your iPhone and Android! Please provide this summary of care documentation to your next provider. Your primary care clinician is listed as Devyn Choi. If you have any questions after today's visit, please call [phone].

## 2018-03-30 NOTE — PATIENT INSTRUCTIONS
[address] [facility]   Statement to Patients  April 1, 2014      In an effort to ensure the large volume of patient prescription refills is processed in the most efficient and expeditious manner, we are asking our patients to assist us by calling your Pharmacy for all prescription refills, this will include also your  Mail Order Pharmacy. The pharmacy will contact our office electronically to continue the refill process. Please do not wait until the last minute to call your pharmacy. We need at least 48 hours (2days) to fill prescriptions. We also encourage you to call your pharmacy before going to  your prescription to make sure it is ready. With regard to controlled substance prescription refill requests (narcotic refills) that need to be picked up at our office, we ask your cooperation by providing us with at least 72 hours (3days) notice that you will need a refill. We will not refill narcotic prescription refill requests after 4:00pm on any weekday, Monday through Thursday, or after 2:00pm on Fridays, or on the weekends. We encourage everyone to explore another way of getting your prescription refill request processed using [employer], our patient web portal through our electronic medical record system. [employer] is an efficient and effective way to communicate your medication request directly to the office and  downloadable as an daisha on your smart phone . [employer] also features a review functionality that allows you to view your medication list as well as leave messages for your physician. Are you ready to get connected? If so please review the attatched instructions or speak to any of our staff to get you set up right away! Thank you so much for your cooperation. Should you have any questions please contact our Practice Administrator.     The Physicians and Staff,  [facility]        Advance Directives: Care Instructions  Your Care Instructions  An advance directive is a legal way to state your wishes at the end of your life. It tells your family and your doctor what to do if you can no longer say what you want. There are two main types of advance directives. You can change them any time that your wishes change. · A living will tells your family and your doctor your wishes about life support and other treatment. · A durable power of  for health care lets you name a person to make treatment decisions for you when you can't speak for yourself. This person is called a health care agent. If you do not have an advance directive, decisions about your medical care may be made by a doctor or a  who doesn't know you. It may help to think of an advance directive as a gift to the people who care for you. If you have one, they won't have to make tough decisions by themselves. Follow-up care is a key part of your treatment and safety. Be sure to make and go to all appointments, and call your doctor if you are having problems. It's also a good idea to know your test results and keep a list of the medicines you take. How can you care for yourself at home? · Discuss your wishes with your loved ones and your doctor. This way, there are no surprises. · Many states have a unique form. Or you might use a universal form that has been approved by many states. This kind of form can sometimes be completed and stored online. Your electronic copy will then be available wherever you have a connection to the Internet. In most cases, doctors will respect your wishes even if you have a form from a different state. · You don't need a  to do an advance directive. But you may want to get legal advice. · Think about these questions when you prepare an advance directive:  ¨ Who do you want to make decisions about your medical care if you are not able to? Many people choose a family member or close friend.   ¨ Do you know enough about life support methods that might be used? If not, talk to your doctor so you understand. ¨ What are you most afraid of that might happen? You might be afraid of having pain, losing your independence, or being kept alive by machines. ¨ Where would you prefer to die? Choices include your home, a hospital, or a nursing home. ¨ Would you like to have information about hospice care to support you and your family? ¨ Do you want to donate organs when you die? ¨ Do you want certain Jehovah's witness practices performed before you die? If so, put your wishes in the advance directive. · Read your advance directive every year, and make changes as needed. When should you call for help? Be sure to contact your doctor if you have any questions. Where can you learn more? Go to http://charan-kamala.info/. Enter R264 in the search box to learn more about \"Advance Directives: Care Instructions. \"  Current as of: September 24, 2016  Content Version: 11.4  © 3285-0028 [employer]. Care instructions adapted under license by [employer] (which disclaims liability or warranty for this information). If you have questions about a medical condition or this instruction, always ask your healthcare professional. [place] [address] any warranty or liability for your use of this information. Advance Directives: Care Instructions  Your Care Instructions  An advance directive is a legal way to state your wishes at the end of your life. It tells your family and your doctor what to do if you can no longer say what you want. There are two main types of advance directives. You can change them any time that your wishes change. · A living will tells your family and your doctor your wishes about life support and other treatment. · A durable power of  for health care lets you name a person to make treatment decisions for you when you can't speak for yourself.  This person is called a health care agent.  If you do not have an advance directive, decisions about your medical care may be made by a doctor or a  who doesn't know you. It may help to think of an advance directive as a gift to the people who care for you. If you have one, they won't have to make tough decisions by themselves. Follow-up care is a key part of your treatment and safety. Be sure to make and go to all appointments, and call your doctor if you are having problems. It's also a good idea to know your test results and keep a list of the medicines you take. How can you care for yourself at home? · Discuss your wishes with your loved ones and your doctor. This way, there are no surprises. · Many states have a unique form. Or you might use a universal form that has been approved by many states. This kind of form can sometimes be completed and stored online. Your electronic copy will then be available wherever you have a connection to the Internet. In most cases, doctors will respect your wishes even if you have a form from a different state. · You don't need a  to do an advance directive. But you may want to get legal advice. · Think about these questions when you prepare an advance directive:  ¨ Who do you want to make decisions about your medical care if you are not able to? Many people choose a family member or close friend. ¨ Do you know enough about life support methods that might be used? If not, talk to your doctor so you understand. ¨ What are you most afraid of that might happen? You might be afraid of having pain, losing your independence, or being kept alive by machines. ¨ Where would you prefer to die? Choices include your home, a hospital, or a nursing home. ¨ Would you like to have information about hospice care to support you and your family? ¨ Do you want to donate organs when you die? ¨ Do you want certain Pentecostal practices performed before you die?  If so, put your wishes in the advance directive. · Read your advance directive every year, and make changes as needed. When should you call for help? Be sure to contact your doctor if you have any questions. Where can you learn more? Go to http://charan-kamala.info/. Enter R264 in the search box to learn more about \"Advance Directives: Care Instructions. \"  Current as of: September 24, 2016  Content Version: 11.4  © 1794-3406 [employer]. Care instructions adapted under license by [email or web address] (which disclaims liability or warranty for this information). If you have questions about a medical condition or this instruction, always ask your healthcare professional. [address] any warranty or liability for your use of this information.

## 2018-04-01 NOTE — PROGRESS NOTES
Neurology Progress Note    Patient ID:  Dagmar Katz  [id number]  66 y.o.  1950    HISTORY PROVIDED BY:  Patient      Chief Complaint: Left sided nerve pain  Subjective:    Ms. Luis Del Rosario is here for follow up today of left sided nerve pain. Since last visit she did have an MRI of the C spine. This did show some degenerative disc disease with some neuroforaminal narrowing. Patient reports her pain does start midthoracic and then can go up or down. She had decreased her Lexapro prior to our first visit and thought this may be related. She has subsequently gone back to her previous dose and the symptoms did not resolve. She had tried gabapentin but had severe headaches for this. She is off of that at this time. We did give her prescription for Lyrica to try, but she read side effects and was concerned and did not take this either. Patient is still having some issues with headaches. Patient has not had a recent EMG/NCS, but has had one prior, so she does not want to go through this most necessary. She has not tried physical therapy and would like to try this at this point. Recap: Dagmar Katz is a 69 y.o. female who I am asked to see in consultation for left sided nerve pain. She reports the pain starts in her side in the midthoracic area and goes down the left and up the head. She has had that off and on for years. This mostly flares when she gets really tired. However the last month it happens every day. She was on lexapro 20 and cut this to 10mg. She decreased because she was stable in terms of mood. She did try gabapentin and this stopped the sensation but gave her severe headaches. She got up to 300mg qhs. The headaches were severe with vomiting. Last night was her first night off the gabapentin. She does have a migraine history. She had an ultrasound last week that showed no change in her cyst that she has. She had an EMG/NCS about 10 years ago of her legs and arm and this was negative.   No weakness on the left side. No relieving factors for this. She has to take a sleeping pill to sleep. The sensation is tingling and scratching that is constant discomfort. She feels like it can make her breathless. She doesn't recall neuroimaging but she thinks she does have cervical stenosis. She was doing well in terms of migraines until trying the gabapentin. She had been headache for over 2 years. She does have cervical neck pain. She recently retired here several years ago from [place]. She has been enjoying her jail has been staying very active. Objective:   ROS:  Per HPI-  Otherwise 12 point ROS was negative    Meds:  Current Outpatient Prescriptions on File Prior to Visit   Medication Sig Dispense Refill    escitalopram oxalate (LEXAPRO) 20 mg tablet TAKE 1 TABLET DAILY 90 Tab 2    SUMAtriptan (IMITREX) 100 mg tablet Take once daily as needed for migraine 12 Tab 5    lidocaine-prilocaine (EMLA) topical cream Apply  to affected area as needed for Pain. 25 g 0    losartan-hydroCHLOROthiazide (HYZAAR) 100-25 mg per tablet Take 1 Tab by mouth daily. 90 Tab 3    atorvastatin (LIPITOR) 40 mg tablet Take 1 Tab by mouth nightly. 90 Tab 3    montelukast (SINGULAIR) 10 mg tablet Take 1 Tab by mouth daily. 90 Tab 3    albuterol (PROAIR HFA) 90 mcg/actuation inhaler Take 2 Puffs by inhalation every four (4) hours as needed for Wheezing or Shortness of Breath. 2 Inhaler 3    levothyroxine (SYNTHROID) 50 mcg tablet Take 1 Tab by mouth Daily (before breakfast). 90 Tab 3    zolpidem (AMBIEN) 5 mg tablet Take 1 Tab by mouth nightly as needed for Sleep. Max Daily Amount: 5 mg. Indications: SLEEP-ONSET INSOMNIA 30 Tab 0    ondansetron (ZOFRAN ODT) 4 mg disintegrating tablet Take 2 Tabs by mouth every eight (8) hours as needed for Nausea. 30 Tab 0    levoFLOXacin (LEVAQUIN) 500 mg tablet Take 1 Tab by mouth daily.  3 Tab 0    pregabalin (LYRICA) 50 mg capsule Take 1 Cap by mouth two (2) times a day. Max Daily Amount: 100 mg. 42 Cap 0    BREO ELLIPTA 200-25 mcg/dose inhaler       azelastine (ASTELIN) 137 mcg (0.1 %) nasal spray       fluticasone (FLONASE) 50 mcg/actuation nasal spray 2 Sprays by Both Nostrils route daily.  Omega-3-DHA-EPA-Fish Oil 1,000 mg (120 mg-180 mg) cap Take  by mouth.  multivitamin (ONE A DAY) tablet Take 1 Tab by mouth daily.  ascorbic acid (VITAMIN C) 500 mg tablet Take  by mouth. No current facility-administered medications on file prior to visit. Imaging:  MRI cervical spine: Multilevel degenerative disc disease and degenerative changes, worst at the  C4-C5 and C5-C6 levels. Severe right neuroforaminal narrowing at C5-C6. Mild  spinal canal stenosis at C4-C5 and C5-C6. Reviewed records in [facility] [employer] tab today    Lab Review   Results for orders placed or performed in visit on 02/22/18   OVA & PARASITES, STOOL   Result Value Ref Range    Ova & Parasite exam       No ova, cysts, or parasites seen. .  One negative specimen does not rule out the possibility of a  parasitic infection. CAMPLYLOBACTER CULTURE   Result Value Ref Range    Campylobacter Culture No Campylobacter species isolated. CULTURE, STOOL, VIBRIO ONLY   Result Value Ref Range    Stool Culture, Vibrio No Vibrio isolated. WBC, STOOL   Result Value Ref Range    White blood cells, stool No white blood cells seen. None Seen       Exam:  Visit Vitals    /68    Ht 5' 8\" (1.727 m)    Wt 79.4 kg (175 lb)    BMI 26.61 kg/m2     Gen: Well developed  CV: RRR  Lungs: non labored breathing  Abd: non distending  Neuro: A&O x 3, no dysarthria or aphasia  CN II-XII: PERRL, EOMI, face symmetric, tongue/palate midline  Motor: strength 5/5 all four ext  Sensory: intact to LT  Gait: normal    Assessment:   Ian Pal is a 77 y.o. female who presents for follow up of left sided nerve pain.   She did have an MRI of the cervical spine that showed some progression of her cervical stenosis with some neuroforaminal narrowing. I do suspect she has disks in her thoracic spine as well. Patient would like to try physical therapy this if she can strengthen her spine and improve her symptoms. If physical therapy does not help, we will proceed with EMG/NCS for further evaluation. Patient is not interested in any surgical intervention at this point in time. She does not want to take any neuropathic pain medications, but if she does she would like to retry the gabapentin. Plan:     1. MRI of the cervical spine as above  2. Off of gabapentin due to side effects. Patient had a significant headache with it. She is considering retrying it  3. Continue Lexapro 20 mg daily  4. Patient does not want to do Lyrica due to concern for side effects  5. Discussed potential etiology of patient's symptoms. Will do a referral to physical therapy  6. Pt has had a previous EMG. Will only repeat if she does not improve with physical therapy or we need to consider surgery    FU 3 months    Signed:  Bren Hamlin MD  3/31/2018    This note was created using voice recognition software. Despite editing, there may be syntax errors. This note will not be viewable in [address]

## 2018-05-09 ENCOUNTER — HOSPITAL ENCOUNTER (OUTPATIENT)
Dept: PHYSICAL THERAPY | Age: 68
Discharge: HOME | End: 2018-05-09
Payer: MEDICARE

## 2018-05-09 PROCEDURE — 97110 THERAPEUTIC EXERCISES: CPT | Performed by: PHYSICAL THERAPIST

## 2018-05-09 PROCEDURE — 97014 ELECTRIC STIMULATION THERAPY: CPT | Performed by: PHYSICAL THERAPIST

## 2018-05-09 PROCEDURE — G8985 CARRY GOAL STATUS: HCPCS | Performed by: PHYSICAL THERAPIST

## 2018-05-09 PROCEDURE — 97161 PT EVAL LOW COMPLEX 20 MIN: CPT | Performed by: PHYSICAL THERAPIST

## 2018-05-09 PROCEDURE — G8984 CARRY CURRENT STATUS: HCPCS | Performed by: PHYSICAL THERAPIST

## 2018-05-09 PROCEDURE — 97140 MANUAL THERAPY 1/> REGIONS: CPT | Performed by: PHYSICAL THERAPIST

## 2018-05-09 NOTE — PROGRESS NOTES
PT INITIAL EVALUATION NOTE - [facility] 2-15    Patient Name: Juana Reyna  Date:2018  : 1950  [x]  Patient  Verified  Payor: Kaurmiguel a Hagan / Plan: [place] MED[place] & B / Product Type: Medicare /    In time:8:05 AM  Out time:9:00 AM  Total Treatment Time (min): 55  Total Timed Codes (min): 25  1:1 Treatment Time ( only): 45   Visit #: 1     Treatment Area: Neck pain [M54.2]    SUBJECTIVE  Pain Level (0-10 scale): 2  Any medication changes, allergies to medications, adverse drug reactions, diagnosis change, or new procedure performed?: [] No    [x] Yes (see summary sheet for update)  Subjective:    Left-sided neck pain  PLOF: No limitations with carrying, pushing, lifting objects overhead with the left UE  Mechanism of Injury: Insidious, began in 2018. The pain radiates from the neck into the shoulder blade (periscapular region) and down into the back. She also reports numbness/tingling that will radiate into the shoulder and upper arm. Previous Treatment/Compliance: She consulted her PCP, who diagnosed DDD at C5-C6 via x-ray, and referred her to PT. PMHx/Surgical Hx: No other significant PMH  Work Hx: Retired, very active with yoga and exercise classes  Living Situation: lives at home with   Pt Goals: to reduce pain and improve mobility  Barriers: Chronicity  Motivation: very motivated  Substance use: none  FABQ Score: low  Cognition: A & O x 3        OBJECTIVE/EXAMINATION  Posture: Forward head and rounded shoulders in sitting, head is held in R SB  Palpation: TTP along the left upper trapezius, upper rhomboid  Joint Mobility: Hypomobile along the lower cervical spine, reports soreness at c-spine, but no referral of pain        Cervical AROM (degrees):         R  L    Flexion    45        Extension   40        Side Bending   30  40        Rotation   50  60  Patient reports muscular pain with R SB and rotation              UPPER QUARTER   MUSCLE STRENGTH  KEY       R  L  0 - No Contraction  C5 Deltoid/Biceps 5/5  5/5   1 - Trace   C6 Wrist Ext  5/5  4/5   2 - Poor   C7 Triceps  5/5  4/5   3 - Fair    C8 Thumb Ext  5/5  5/5  4 - Good   T1 Hand Inst  5/5  5/5    5 - Normal          Neurological: Sensation: Intact  Special Tests:    Cervical Compression:Negative   Cervical Distraction:Negative   Spurlings:Positive for scapular pain   Upper Limb Neural Tension:    Median: Negative    Radial: Negative    Ulnar:Negative        Modality rationale: decrease pain and increase tissue extensibility to improve the patients ability to turn her head without pain   Min Type Additional Details   15 [x] Estim: []Att   [x]Unatt        []TENS instruct                  [x]IFC  []Premod   []NMES                     []Other:  []w/US   []w/ice   [x]w/heat  Position:seated  Location:neck    []  Traction: [] Cervical       []Lumbar                       [] Prone          []Supine                       []Intermittent   []Continuous Lbs:  [] before manual  [] after manual  []w/heat    []  Ultrasound: []Continuous   [] Pulsed at:                           []1MHz   []3MHz Location:  W/cm2:    [] Paraffin         Location:   []w/heat    []  Ice     []  Heat  []  Ice massage Position:  Location:    []  Laser  []  Other: Position:  Location:      []  Vasopneumatic Device Pressure:       [] lo [] med [] hi   Temperature:      [x] Skin assessment post-treatment:  [x]intact []redness- no adverse reaction    []redness - adverse reaction:     10 min Therapeutic Exercise:  [x] See flow sheet :   Rationale: increase ROM and improve coordination to improve the patients ability to turn her head without pain    15 min Manual Therapy:   Grade III P/A mobilizations at the lower cervical spine  Passive stretching for the left upper trapezius in supine  Grade III scapular mobilizations in all direction in right sidelying  TPR to the left upper rhomboid in right sidelying   Rationale: decrease pain, increase ROM, increase tissue extensibility and decrease trigger points to improve the patients ability to turn her head without pain          With   [] TE   [] TA   [] neuro   [] other: Patient Education: [x] Review HEP    [] Progressed/Changed HEP based on:   [] positioning   [] body mechanics   [] transfers   [] heat/ice application    [] other:      Other Objective/Functional Measures:    Pain Level (0-10 scale) post treatment: 0    ASSESSMENT/Changes in Function:     [x]  See Plan of Kwabena Galarza, PT , DPT, OCS, Cert.  DN   5/9/2018  10:09 AM

## 2018-05-09 NOTE — PROGRESS NOTES
[address] [place] [address]  Phone: [phone]  Fax: [phone]    Plan of Care/ Statement of Necessity for Physical Therapy Services 2-15    Patient name: Lauralee Duverney  : 1950  Provider#: [id number]  Referral source: John Eric MD      Medical/Treatment Diagnosis: Neck pain [M54.2]     Prior Hospitalization: see medical history     Comorbidities: None  Prior Level of Function: see initial eval  Medications: Verified on Patient Summary List    Start of Care: 18      Onset Date: 2018       The [address] [place] [address] and following information is based on the information from the initial evaluation. Assessment/ key information: Patient presents with chronic left-sided neck pain with an insidious onset. Today she presented with decreased cervical AROM in sidebending and rotation along with decreased tolerance to overhead lifting with the left UE. Evaluation Complexity History LOW Complexity : Zero comorbidities / personal factors that will impact the outcome / POC; Examination MEDIUM Complexity : 3 Standardized tests and measures addressing body structure, function, activity limitation and / or participation in recreation  ;Presentation MEDIUM Complexity : Evolving with changing characteristics  ; Clinical Decision Making MEDIUM Complexity : FOTO score of 26-74  Overall Complexity Rating: LOW     Problem List: pain affecting function, decrease ROM, decrease strength, impaired gait/ balance, decrease ADL/ functional abilitiies, decrease activity tolerance and decrease flexibility/ joint mobility   Treatment Plan may include any combination of the following: Therapeutic exercise, Therapeutic activities, Neuromuscular re-education, Physical agent/modality, Gait/balance training, Manual therapy, Patient education, Self Care training, Functional mobility training, Home safety training and Stair training  Patient / Family readiness to learn indicated by: asking questions, trying to perform skills and interest  Persons(s) to be included in education: patient (P)  Barriers to Learning/Limitations: None  Patient Goal (s): I want to be able to turn my head without as much pain.   Patient Self Reported Health Status: excellent  Rehabilitation Potential: excellent    Short Term Goals: To be accomplished in 8 treatments:  1. Patient will be able to demonstrate cervical AROM rotation >60 degrees in both directions to allow for improved ability to drive without pain. 2. Patient will be able to carry 20# at her left side for 50 ft. With no pain or limitation. 3. Patient will be able to lift 1# over her head with the L UE with <2/10 neck pain. Long Term Goals: To be accomplished in 16 treatments:  1. Patient will be able to demonstrate cervical AROM rotation >70 degrees in both directions with no pain or limitation. 2. Patient will be able to carry 30# at her left side for 50 ft. With no pain or limitation. 3. Patient will be able to lift 5# over her head with the L UE with <2/10 neck pain. Frequency / Duration: Patient to be seen 2 times per week for 8 weeks. Patient/ Caregiver education and instruction: self care, activity modification and exercises    [x]  Plan of care has been reviewed with PTA      G-Codes (GP)  Carry   Current  CK= 40-59%    Goal  CJ= 20-39%    The severity rating is based on clinical judgment and the FOTO Score score. Certification Period: 5/9/18 - 8/9/18    Sarah Varela, PT , DPT, OCS, Cert. DN   5/9/2018 10:17 AM    ________________________________________________________________________    I certify that the above Therapy Services are being furnished while the patient is under my care. I agree with the treatment plan and certify that this therapy is necessary.     [de-identified] Signature:____________________  Date:____________Time: _________

## 2018-05-11 ENCOUNTER — HOSPITAL ENCOUNTER (OUTPATIENT)
Dept: PHYSICAL THERAPY | Age: 68
Discharge: HOME | End: 2018-05-11
Payer: MEDICARE

## 2018-05-11 PROCEDURE — 97110 THERAPEUTIC EXERCISES: CPT | Performed by: PHYSICAL THERAPIST

## 2018-05-11 PROCEDURE — 97140 MANUAL THERAPY 1/> REGIONS: CPT | Performed by: PHYSICAL THERAPIST

## 2018-05-11 PROCEDURE — 97014 ELECTRIC STIMULATION THERAPY: CPT | Performed by: PHYSICAL THERAPIST

## 2018-05-11 NOTE — PROGRESS NOTES
PT DAILY TREATMENT NOTE - [facility] 2-15    Patient Name: Isa Jones  Date:2018  : 1950  [x]  Patient  Verified  Payor: Cristela Marely / Plan: [place] MEDICARE PART A & B / Product Type: Medicare /    In time:9:00 AM  Out time:9:40 AM  Total Treatment Time (min): 40  Total Timed Codes (min): 25  1:1 Treatment Time ( [address] only): 25   Visit #: 2     Treatment Area: Neck pain [M54.2]    SUBJECTIVE  Pain Level (0-10 scale): 2  Any medication changes, allergies to medications, adverse drug reactions, diagnosis change, or new procedure performed?: [x] No    [] Yes (see summary sheet for update)  Subjective functional status/changes:   [] No changes reported  Patient reports that she was very sore yesterday, but today she is feeling better overall.     OBJECTIVE           Modality rationale: decrease pain and increase tissue extensibility to improve the patients ability to turn her head without pain   Min Type Additional Details   15 [x] Estim: []Att   [x]Unatt        []TENS instruct                  [x]IFC  []Premod   []NMES                     []Other:  []w/US   []w/ice   [x]w/heat  Position:seated  Location:neck     []  Traction: [] Cervical       []Lumbar                       [] Prone          []Supine                       []Intermittent   []Continuous Lbs:  [] before manual  [] after manual  []w/heat     []  Ultrasound: []Continuous   [] Pulsed at:                           []1MHz   []3MHz Location:  W/cm2:     [] Paraffin      Location:   []w/heat     []  Ice     []  Heat  []  Ice massage Position:  Location:     []  Laser  []  Other: Position:  Location:     []  Vasopneumatic Device Pressure:       [] lo [] med [] hi   Temperature:       [x] Skin assessment post-treatment:  [x]intact []redness- no adverse reaction    []redness - adverse reaction:      10 min Therapeutic Exercise:  [x] See flow sheet :   Rationale: increase ROM and improve coordination to improve the patients ability to turn her head without pain     15 min Manual Therapy:   Grade III P/A mobilizations at the lower cervical spine  Passive stretching for the left upper trapezius in supine  Grade III scapular mobilizations in all direction in right sidelying  TPR to the left upper rhomboid in right sidelying   Rationale: decrease pain, increase ROM, increase tissue extensibility and decrease trigger points to improve the patients ability to turn her head without pain              With   [] TE   [] TA   [] neuro   [] other: Patient Education: [x] Review HEP    [] Progressed/Changed HEP based on:   [] positioning   [] body mechanics   [] transfers   [] heat/ice application    [] other:      Other Objective/Functional Measures:     Pain Level (0-10 scale) post treatment: 0    ASSESSMENT/Changes in Function:     Patient will continue to benefit from skilled PT services to modify and progress therapeutic interventions, address functional mobility deficits, address ROM deficits, address strength deficits, analyze and address soft tissue restrictions, analyze and cue movement patterns, analyze and modify body mechanics/ergonomics and assess and modify postural abnormalities to attain remaining goals. []  See Plan of Care  []  See progress note/recertification  []  See Discharge Summary         Progress towards goals / Updated goals:  Patient tolerated today's therapy well and will assess next visit on progress towards short term goals. PLAN  [x]  Upgrade activities as tolerated     [x]  Continue plan of care  []  Update interventions per flow sheet       []  Discharge due to:_  []  Other:_      Jose Blood, PT , DPT, OCS, Cert.  DN   5/11/2018  10:50 AM

## 2018-05-15 ENCOUNTER — APPOINTMENT (OUTPATIENT)
Dept: PHYSICAL THERAPY | Age: 68
End: 2018-05-15
Payer: MEDICARE

## 2018-05-17 ENCOUNTER — HOSPITAL ENCOUNTER (OUTPATIENT)
Dept: PHYSICAL THERAPY | Age: 68
Discharge: HOME | End: 2018-05-17
Payer: MEDICARE

## 2018-05-17 PROCEDURE — 97014 ELECTRIC STIMULATION THERAPY: CPT | Performed by: PHYSICAL THERAPIST

## 2018-05-17 PROCEDURE — 97140 MANUAL THERAPY 1/> REGIONS: CPT | Performed by: PHYSICAL THERAPIST

## 2018-05-17 PROCEDURE — 97110 THERAPEUTIC EXERCISES: CPT | Performed by: PHYSICAL THERAPIST

## 2018-05-17 NOTE — PROGRESS NOTES
PT DAILY TREATMENT NOTE - [facility] 2-15    Patient Name: Joni Sal  Date:2018  : 1950  [x]  Patient  Verified  Payor: James Pritchard / Plan: [place] MEDICARE PART A & B / Product Type: Medicare /    In time:3:30 PM  Out time:4:10 PM  Total Treatment Time (min): 40  Total Timed Codes (min): 25  1:1 Treatment Time ( [address] only): 25   Visit #: 3    Treatment Area: Neck pain [M54.2]    SUBJECTIVE  Pain Level (0-10 scale):  2  Any medication changes, allergies to medications, adverse drug reactions, diagnosis change, or new procedure performed?: [x] No    [] Yes (see summary sheet for update)  Subjective functional status/changes:   [x] No changes reported      OBJECTIVE           Modality rationale: decrease pain and increase tissue extensibility to improve the patients ability to turn her head without pain   Min Type Additional Details   15 [x] Estim: []Att   [x]Unatt        []TENS instruct                  [x]IFC  []Premod   []NMES                     []Other:  []w/US   []w/ice   [x]w/heat  Position:seated  Location:neck     []  Traction: [] Cervical       []Lumbar                       [] Prone          []Supine                       []Intermittent   []Continuous Lbs:  [] before manual  [] after manual  []w/heat     []  Ultrasound: []Continuous   [] Pulsed at:                           []1MHz   []3MHz Location:  W/cm2:     [] Paraffin      Location:   []w/heat     []  Ice     []  Heat  []  Ice massage Position:  Location:     []  Laser  []  Other: Position:  Location:     []  Vasopneumatic Device Pressure:       [] lo [] med [] hi   Temperature:       [x] Skin assessment post-treatment:  [x]intact []redness- no adverse reaction    []redness - adverse reaction:      10 min Therapeutic Exercise:  [x] See flow sheet :   Rationale: increase ROM and improve coordination to improve the patients ability to turn her head without pain     15 min Manual Therapy:   Grade III P/A mobilizations at the lower cervical spine  Passive stretching for the left upper trapezius in supine  Grade III scapular mobilizations in all direction in right sidelying  TPR to the left upper rhomboid in right sidelying   Rationale: decrease pain, increase ROM, increase tissue extensibility and decrease trigger points to improve the patients ability to turn her head without pain              With   [] TE   [] TA   [] neuro   [] other: Patient Education: [x] Review HEP    [] Progressed/Changed HEP based on:   [] positioning   [] body mechanics   [] transfers   [] heat/ice application    [] other:      Other Objective/Functional Measures:     Pain Level (0-10 scale) post treatment: 0    ASSESSMENT/Changes in Function:     Patient will continue to benefit from skilled PT services to modify and progress therapeutic interventions, address functional mobility deficits, address ROM deficits, address strength deficits, analyze and address soft tissue restrictions, analyze and cue movement patterns, analyze and modify body mechanics/ergonomics and assess and modify postural abnormalities to attain remaining goals. []  See Plan of Care  []  See progress note/recertification  []  See Discharge Summary         Progress towards goals / Updated goals:  Patient has not made significant progress towards short term goals, but is tolerating therapy well and should begin to see improvement soon. PLAN  [x]  Upgrade activities as tolerated     [x]  Continue plan of care  []  Update interventions per flow sheet       []  Discharge due to:_  []  Other:_      Joelle Huddleston, PT , DPT, OCS, Cert.  DN   5/17/2018  10:50 AM

## 2018-05-24 ENCOUNTER — APPOINTMENT (OUTPATIENT)
Dept: PHYSICAL THERAPY | Age: 68
End: 2018-05-24
Payer: MEDICARE

## 2018-05-29 ENCOUNTER — APPOINTMENT (OUTPATIENT)
Dept: PHYSICAL THERAPY | Age: 68
End: 2018-05-29
Payer: MEDICARE

## 2018-05-31 ENCOUNTER — APPOINTMENT (OUTPATIENT)
Dept: PHYSICAL THERAPY | Age: 68
End: 2018-05-31
Payer: MEDICARE

## 2018-06-05 ENCOUNTER — APPOINTMENT (OUTPATIENT)
Dept: PHYSICAL THERAPY | Age: 68
End: 2018-06-05

## 2018-06-07 ENCOUNTER — APPOINTMENT (OUTPATIENT)
Dept: PHYSICAL THERAPY | Age: 68
End: 2018-06-07

## 2018-06-08 ENCOUNTER — TELEPHONE (OUTPATIENT)
Dept: INTERNAL MEDICINE CLINIC | Age: 68
End: 2018-06-08

## 2018-06-08 DIAGNOSIS — E78.2 MIXED HYPERLIPIDEMIA: ICD-10-CM

## 2018-06-08 DIAGNOSIS — J45.41 MODERATE PERSISTENT ASTHMA WITH ACUTE EXACERBATION: ICD-10-CM

## 2018-06-08 DIAGNOSIS — G43.809 OTHER MIGRAINE WITHOUT STATUS MIGRAINOSUS, NOT INTRACTABLE: ICD-10-CM

## 2018-06-08 DIAGNOSIS — I10 ESSENTIAL HYPERTENSION: ICD-10-CM

## 2018-06-08 DIAGNOSIS — F32.89 OTHER DEPRESSION: ICD-10-CM

## 2018-06-08 DIAGNOSIS — J01.10 ACUTE NON-RECURRENT FRONTAL SINUSITIS: ICD-10-CM

## 2018-06-08 DIAGNOSIS — E03.9 HYPOTHYROIDISM, ADULT: ICD-10-CM

## 2018-06-08 RX ORDER — ATORVASTATIN CALCIUM 40 MG/1
40 TABLET, FILM COATED ORAL
Qty: 90 TAB | Refills: 0 | Status: SHIPPED | OUTPATIENT
Start: 2018-06-08 | End: 2018-12-03 | Stop reason: SDUPTHER

## 2018-06-08 RX ORDER — LOSARTAN POTASSIUM AND HYDROCHLOROTHIAZIDE 25; 100 MG/1; MG/1
1 TABLET ORAL DAILY
Qty: 90 TAB | Refills: 0 | Status: SHIPPED | OUTPATIENT
Start: 2018-06-08 | End: 2019-02-28 | Stop reason: SDUPTHER

## 2018-06-08 RX ORDER — ALBUTEROL SULFATE 90 UG/1
2 AEROSOL, METERED RESPIRATORY (INHALATION)
Qty: 3 INHALER | Refills: 0 | Status: SHIPPED | OUTPATIENT
Start: 2018-06-08 | End: 2018-10-02 | Stop reason: SDUPTHER

## 2018-06-08 RX ORDER — LEVOTHYROXINE SODIUM 50 UG/1
50 TABLET ORAL
Qty: 90 TAB | Refills: 0 | Status: SHIPPED | OUTPATIENT
Start: 2018-06-08 | End: 2018-12-01 | Stop reason: SDUPTHER

## 2018-06-08 RX ORDER — FLUTICASONE FUROATE AND VILANTEROL TRIFENATATE 200; 25 UG/1; UG/1
1 POWDER RESPIRATORY (INHALATION) DAILY
Qty: 3 INHALER | Refills: 0 | Status: SHIPPED | OUTPATIENT
Start: 2018-06-08 | End: 2018-10-02 | Stop reason: SDUPTHER

## 2018-06-08 RX ORDER — ESCITALOPRAM OXALATE 20 MG/1
TABLET ORAL
Qty: 90 TAB | Refills: 0 | Status: SHIPPED | OUTPATIENT
Start: 2018-06-08 | End: 2018-10-02 | Stop reason: SDUPTHER

## 2018-06-08 RX ORDER — SUMATRIPTAN 100 MG/1
TABLET, FILM COATED ORAL
Qty: 36 TAB | Refills: 0 | Status: SHIPPED | OUTPATIENT
Start: 2018-06-08 | End: 2018-10-02 | Stop reason: SDUPTHER

## 2018-06-08 RX ORDER — ZOLPIDEM TARTRATE 5 MG/1
5 TABLET ORAL
Qty: 30 TAB | Refills: 0 | Status: SHIPPED | OUTPATIENT
Start: 2018-06-08 | End: 2018-12-01 | Stop reason: SDUPTHER

## 2018-06-08 RX ORDER — MONTELUKAST SODIUM 10 MG/1
10 TABLET ORAL DAILY
Qty: 90 TAB | Refills: 3 | Status: SHIPPED | OUTPATIENT
Start: 2018-06-08 | End: 2018-12-03 | Stop reason: SDUPTHER

## 2018-06-08 NOTE — TELEPHONE ENCOUNTER
----- Message from Luann Cohen sent at 6/7/2018 12:30 PM EDT -----  Regarding: RE: Prescription Question  Contact: [phone]  Unable today? My  has his prescriptions filled electronically by your office to [address] [place] [address].  ----- Message -----  From: Surinder Teague LPN  Sent: 9/6/8447 87:33 AM EDT  To: Luann Cohen  Subject: RE: Prescription Question    Louann  Unfortunately, we cannot electronically link with [facility]. Would you like to  printed scripts here or I can mail to you.      ----- Message -----     From: Luann Cohen     Sent: 6/7/2018  9:56 AM EDT       To: Arnie Johnston MD  Subject: Prescription Question    Please change my preferred pharmacy to:      [facility] at [address] [place] [address], [address] [place] [address] Street    I need new prescriptions for the following meds sent to them. albuterol 90mcg   atorvastatin 40mg   Breo Ellipta 200-25mcg   esitalopram oxalate 20mg   levothyroxine 50mcg   losartan-hydroChlorothiazide 100-25mg   montelukast 10mg   sumatriptan 100mg   zolpidem 5mg    Thank you so much for making this change and submitting the prescriptions.     Have a great day,  Ambrosio Mai  [phone]

## 2018-06-21 NOTE — PROGRESS NOTES
[address] [facility], [address]  Phone: [phone] Fax: [phone]      Discharge Summary 2-15    Patient name: Neel Florian  : 1950  Provider#: [id number]  Referral source: Giovany Bishop MD      Medical/Treatment Diagnosis: Neck pain [M54.2]     Prior Hospitalization: see medical history     Comorbidities: See Plan of Care  Prior Level of Function: See Plan of Care  Medications: Verified on Patient Summary List    Start of Care: 18      Onset Date:2018   Visits from Start of Care: 3     Missed Visits: 1 no show  Reporting Period : 18 to 18    Assessment/Summary of care: Patient was evaluated on 18 for her chronic left-sided neck pain and was treated with manual therapy, modalities, and gentle therapeutic exercise. She returned for two f/u visits with moderate improvement in her overall pain. She did not return after her last visit on 18 and at this time will be discharged from PT, but she is welcomed to return to therapy if it is deemed appropriate. Short Term Goals: To be accomplished in 8 treatments:  1. Patient will be able to demonstrate cervical AROM rotation >60 degrees in both directions to allow for improved ability to drive without pain. Met.  2. Patient will be able to carry 20# at her left side for 50 ft. With no pain or limitation. Met.  3. Patient will be able to lift 1# over her head with the L UE with <2/10 neck pain. Met. Long Term Goals: To be accomplished in 16 treatments:  1. Patient will be able to demonstrate cervical AROM rotation >70 degrees in both directions with no pain or limitation. Not met. 2. Patient will be able to carry 30# at her left side for 50 ft. With no pain or limitation. Not met. 3. Patient will be able to lift 5# over her head with the L UE with <2/10 neck pain. Not met.     G-Codes (GP)  Carry    Goal  CJ= 20-39%   D/C  CK= 40-59%    The severity rating is based on clinical judgment and the FOTO Score score. RECOMMENDATIONS:  [x]Discontinue therapy: [x]Patient has reached or is progressing toward set goals     []Patient is non-compliant or has abdicated     []Due to lack of appreciable progress towards set goals     []Other  Brain Harada, PT , DPT, OCS, Cert.  DN   6/21/2018 12:31 PM

## 2018-06-28 ENCOUNTER — OFFICE VISIT (OUTPATIENT)
Dept: INTERNAL MEDICINE CLINIC | Age: 68
End: 2018-06-28

## 2018-06-28 VITALS
SYSTOLIC BLOOD PRESSURE: 124 MMHG | OXYGEN SATURATION: 97 % | TEMPERATURE: 98.7 F | HEART RATE: 86 BPM | DIASTOLIC BLOOD PRESSURE: 66 MMHG | RESPIRATION RATE: 14 BRPM | BODY MASS INDEX: 27.46 KG/M2 | HEIGHT: 68 IN | WEIGHT: 181.2 LBS

## 2018-06-28 DIAGNOSIS — I10 ESSENTIAL HYPERTENSION: ICD-10-CM

## 2018-06-28 DIAGNOSIS — Z01.818 PREOP GENERAL PHYSICAL EXAM: ICD-10-CM

## 2018-06-28 DIAGNOSIS — N81.4 UTERINE PROLAPSE: Primary | ICD-10-CM

## 2018-06-28 DIAGNOSIS — E03.9 HYPOTHYROIDISM, ADULT: ICD-10-CM

## 2018-06-28 DIAGNOSIS — J45.40 MODERATE PERSISTENT ASTHMA WITHOUT COMPLICATION: ICD-10-CM

## 2018-06-28 NOTE — PROGRESS NOTES
HISTORY OF PRESENT ILLNESS  Damir Castillo is a 77 y.o. female. HPI  Damir Castillo was referred for evaluation by: Dr. Maricruz Davis for Pre- Op Evaluation. Please see encounter details and orders for consultative summary. Type of surgery : Anterior and Posterior repair with cystoscopy  Surgery site : Pelvis  Anesthesia type: General anesthesia  Date of procedure:  7/10/2018    Allergies: Allergies   Allergen Reactions    Sulfa (Sulfonamide Antibiotics) Anaphylaxis     Latex allergy: no  Prior reactions to anesthesia:  None    Functional status:  she is able to ambulate up 2 flights of step without shortness of breath, chest pain  Prior cardiac evaluation:   None    Asthma has been well controlled with daily use of Breo; has not needed to use her rescue inhaler recently since spring allergies have decreased in intensity. Subjective: Damir Castillo is a 77 y.o. female with hypertension. Hypertension ROS: taking medications as instructed, no medication side effects noted, no TIA's, no chest pain on exertion, no dyspnea on exertion, no swelling of ankles. New concerns: well controlled on current regimen. Hypothyroidism has been stable on current dose of Levothyroxine. Current Outpatient Prescriptions   Medication Sig    albuterol (PROAIR HFA) 90 mcg/actuation inhaler Take 2 Puffs by inhalation every four (4) hours as needed for Wheezing or Shortness of Breath for up to 90 days.  atorvastatin (LIPITOR) 40 mg tablet Take 1 Tab by mouth nightly for 90 days.  BREO ELLIPTA 200-25 mcg/dose inhaler Take 1 Puff by inhalation daily for 90 days.  escitalopram oxalate (LEXAPRO) 20 mg tablet TAKE 1 TABLET DAILY    levothyroxine (SYNTHROID) 50 mcg tablet Take 1 Tab by mouth Daily (before breakfast) for 90 days.  losartan-hydroCHLOROthiazide (HYZAAR) 100-25 mg per tablet Take 1 Tab by mouth daily for 90 days.  montelukast (SINGULAIR) 10 mg tablet Take 1 Tab by mouth daily.     SUMAtriptan (IMITREX) 100 mg tablet Take once daily as needed for migraine    zolpidem (AMBIEN) 5 mg tablet Take 1 Tab by mouth nightly as needed for Sleep for up to 30 days. Max Daily Amount: 5 mg. Indications: SLEEP-ONSET INSOMNIA    Omega-3-DHA-EPA-Fish Oil 1,000 mg (120 mg-180 mg) cap Take  by mouth.  multivitamin (ONE A DAY) tablet Take 1 Tab by mouth daily.  ascorbic acid (VITAMIN C) 500 mg tablet Take  by mouth.  azelastine (ASTELIN) 137 mcg (0.1 %) nasal spray      No current facility-administered medications for this visit. Past Medical History:   Diagnosis Date    Arthritis     Asthma     Elevated lipids 9/15/2015    Essential hypertension 9/15/2015    Hypercholesterolemia     Hypothyroidism, adult 9/15/2015    Migraine     allergy mediated in sping and fall    Sun-damaged skin      Past Surgical History:   Procedure Laterality Date    HX BREAST BIOPSY Left yrs ago    neg; surgical bx    HX  SECTION      HX CHOLECYSTECTOMY      HX CYST INCISION AND DRAINAGE Left over years    neg    HX KNEE ARTHROSCOPY Right     x 2    HX SHOULDER ARTHROSCOPY Left     HX TONSILLECTOMY       Social History   Substance Use Topics    Smoking status: Never Smoker    Smokeless tobacco: Never Used    Alcohol use 3.5 oz/week     7 Glasses of wine per week       Blood pressure 124/66, pulse 86, temperature 98.7 °F (37.1 °C), temperature source Oral, resp. rate 14, height 5' 8\" (1.727 m), weight 181 lb 3.2 oz (82.2 kg), last menstrual period 03/15/1998, SpO2 97 %. Review of Systems   Constitutional: Negative for chills, fever and malaise/fatigue. HENT: Negative for congestion and ear pain. Respiratory: Negative for cough and shortness of breath. Cardiovascular: Negative for chest pain and palpitations. Gastrointestinal: Negative for abdominal pain, blood in stool, constipation, diarrhea, nausea and vomiting. Genitourinary: Negative for dysuria and frequency.    Musculoskeletal: Negative for back pain and myalgias. Skin: Negative for rash. Neurological: Negative for dizziness, tingling and headaches. Physical Exam   Constitutional: She is oriented to person, place, and time. She appears well-developed and well-nourished. HENT:   Head: Normocephalic and atraumatic. Right Ear: External ear normal.   Left Ear: External ear normal.   Nose: Nose normal.   Mouth/Throat: Oropharynx is clear and moist.   Eyes: Conjunctivae are normal.   Neck: Normal range of motion. Neck supple. No thyromegaly present. Cardiovascular: Normal rate, regular rhythm and normal heart sounds. Pulmonary/Chest: Effort normal and breath sounds normal. She has no wheezes. Abdominal: Soft. Bowel sounds are normal. There is no tenderness. There is no rebound. Musculoskeletal: She exhibits no edema. Lymphadenopathy:     She has no cervical adenopathy. Neurological: She is alert and oriented to person, place, and time. Skin: Skin is warm and dry. No rash noted. Psychiatric: She has a normal mood and affect. Her behavior is normal.   Nursing note and vitals reviewed. ASSESSMENT and PLAN  Diagnoses and all orders for this visit:    1. Uterine prolapse    2. Preop general physical exam -- considered medically stable for upcoming Pelvic surgery under General anesthesia. She is to have preoperative labs done at [address] [place] [address] tomorrow    3. Hypothyroidism, adult -- stable    4. Essential hypertension --stable    5.  Moderate persistent asthma without complication -- well controlled on ICS/LABA daily; on maintenance therapy with allergy injections      lab results and schedule of future lab studies reviewed with patient  reviewed diet, exercise and weight control  reviewed medications and side effects in detail

## 2018-06-28 NOTE — PATIENT INSTRUCTIONS
Pelvic Prolapse: Before Your Surgery  What is surgery for pelvic prolapse? Your pelvic muscles hold your pelvic organs in place. If they become weak, your uterus, bowel, or bladder may press against your vagina. This is called a pelvic prolapse. Surgery puts your organ back in place. It also adds support to your muscles. You will be asleep during the surgery. You will not feel pain. The doctor can do the surgery in two ways. In open surgery, the doctor makes a cut in your belly or inside the vagina. The cut is called an incision. In laparoscopic surgery, the doctor puts a lighted tube and other surgical tools through small incisions near your belly button and groin. This tube is called a scope. It lets the doctor see your organs. If you have open surgery, you will go home in 1 to 4 days. It usually takes about 4 to 6 weeks to fully recover. If you have laparoscopic surgery, you will go home in 1 or 2 days. It usually takes 1 to 2 weeks to fully recover. At home, you may need to wear a catheter. This is a tube in your bladder. It carries urine out of your body. After surgery, you may have less pain during sex. The surgery may also help with any bladder or bowel problems you may have had. If you still have your uterus, your ability to have children will not be affected. Follow-up care is a key part of your treatment and safety. Be sure to make and go to all appointments, and call your doctor if you are having problems. It's also a good idea to know your test results and keep a list of the medicines you take. What happens before surgery? ?Surgery can be stressful. This information will help you understand what you can expect. And it will help you safely prepare for surgery. ? Preparing for surgery  ? · Understand exactly what surgery is planned, along with the risks, benefits, and other options. · Tell your doctors ALL the medicines, vitamins, supplements, and herbal remedies you take.  Some of these can increase the risk of bleeding or interact with anesthesia. ? · If you take blood thinners, such as warfarin (Coumadin), clopidogrel (Plavix), or aspirin, be sure to talk to your doctor. He or she will tell you if you should stop taking these medicines before your surgery. Make sure that you understand exactly what your doctor wants you to do.   ? · Your doctor will tell you which medicines to take or stop before your surgery. You may need to stop taking certain medicines a week or more before surgery. So talk to your doctor as soon as you can.   ? · If you have an advance directive, let your doctor know. It may include a living will and a durable power of  for health care. Bring a copy to the hospital. If you don't have one, you may want to prepare one. It lets your doctor and loved ones know your health care wishes. Doctors advise that everyone prepare these papers before any type of surgery or procedure. ? · You may need to take a laxative or enema before surgery. Your doctor will tell you how to do this. What happens on the day of surgery? · Follow the instructions exactly about when to stop eating and drinking. If you don't, your surgery may be canceled. If your doctor told you to take your medicines on the day of surgery, take them with only a sip of water. ? · Take a bath or shower before you come in for your surgery. Do not apply lotions, perfumes, deodorants, or nail polish. ? · Do not shave the surgical site yourself. ? · Take off all jewelry and piercings. And take out contact lenses, if you wear them. ? At the hospital or surgery center   · Bring a picture ID. ? · The area for surgery is often marked to make sure there are no errors. ? · You will be kept comfortable and safe by your anesthesia provider. You will be asleep during the surgery. ? · The surgery will take 45 minutes to 2 hours. Going home   · Be sure you have someone to drive you home.  Anesthesia and pain medicine make it unsafe for you to drive. ? · You will be given more specific instructions about recovering from your surgery. They will cover things like diet, wound care, follow-up care, driving, and getting back to your normal routine. When should you call your doctor? · You have questions or concerns. ? · You don't understand how to prepare for your surgery. ? · You become ill before the surgery (such as fever, flu, or a cold). ? · You need to reschedule or have changed your mind about having the surgery. Where can you learn more? Go to http://charan-kamala.info/. Enter I213 in the search box to learn more about \"Pelvic Prolapse: Before Your Surgery. \"  Current as of: October 13, 2016  Content Version: 11.4  © 7629-5279 Healthwise, Incorporated. Care instructions adapted under license by [employer] (which disclaims liability or warranty for this information). If you have questions about a medical condition or this instruction, always ask your healthcare professional. [address] any warranty or liability for your use of this information.

## 2018-06-28 NOTE — MR AVS SNAPSHOT
[address] [facility] [address] 
 
 
 [address] [place] [address] 
[phone] Patient: Joni Sal MRN: [id number] DYF:[id number] Visit Information Date & Time Provider Department Dept. Phone Encounter #  
 6/28/2018  9:00 AM Petty Quiroz NP [facility] [address] [facility] [id number] Your Appointments 7/10/2018  9:40 AM  
Follow Up with Sandrine Pedroza MD  
[address] [facility] ([facility]) Appt Note: 3 month follow up [place] [address] [facility] 99 [id number] [phone]  
  
   
 [place] [address] [facility] [address] [id number] [address] Upcoming Health Maintenance Date Due  
 GLAUCOMA SCREENING Q2Y 8/1/2017 FOBT Q 1 YEAR AGE 50-75 9/27/2017 MEDICARE YEARLY EXAM 4/27/2018 Influenza Age 5 to Adult 8/1/2018 BREAST CANCER SCRN MAMMOGRAM 5/23/2019 DTaP/Tdap/Td series (2 - Td) 9/27/2026 Allergies as of 6/28/2018  Review Complete On: 6/28/2018 By: Petty Quiroz NP Severity Noted Reaction Type Reactions Sulfa (Sulfonamide Antibiotics) High 03/15/2015    Anaphylaxis Current Immunizations  Reviewed on 1/31/2017 Name Date Influenza Vaccine 9/15/2015 Pneumococcal Conjugate (PCV-13) 10/4/2016 Not reviewed this visit You Were Diagnosed With   
  
 Codes Comments Uterine prolapse    -  Primary ICD-10-CM: N81.4 ICD-9-CM: 618.1 Preop general physical exam     ICD-10-CM: I80.048 ICD-9-CM: V72.83 Hypothyroidism, adult     ICD-10-CM: E03.9 ICD-9-CM: 244.9 Essential hypertension     ICD-10-CM: I10 
ICD-9-CM: 401.9 Moderate persistent asthma without complication     H-90-XC: J45.40 ICD-9-CM: 493.90 Vitals BP Pulse Temp Resp Height(growth percentile) Weight(growth percentile)  124/66 (BP 1 Location: Left arm, BP Patient Position: Sitting) 86 98.7 °F (37.1 °C) (Oral) 14 5' 8\" (1.727 m) 181 lb 3.2 oz (82.2 kg) LMP SpO2 BMI OB Status Smoking Status 03/15/1998 97% 27.55 kg/m2 Postmenopausal Never Smoker BMI and BSA Data Body Mass Index Body Surface Area  
 27.55 kg/m 2 1.99 m 2 Preferred Pharmacy Pharmacy Name Phone [facility] [address] [phone] Your Updated Medication List  
  
   
This list is accurate as of 6/28/18  9:47 AM.  Always use your most recent med list.  
  
  
  
  
 albuterol 90 mcg/actuation inhaler Commonly known as:  PROAIR HFA Take 2 Puffs by inhalation every four (4) hours as needed for Wheezing or Shortness of Breath for up to 90 days. atorvastatin 40 mg tablet Commonly known as:  LIPITOR Take 1 Tab by mouth nightly for 90 days. azelastine 137 mcg (0.1 %) nasal spray Commonly known as:  ASTELIN  
  
 BREO ELLIPTA 200-25 mcg/dose inhaler Generic drug:  fluticasone-vilanterol Take 1 Puff by inhalation daily for 90 days. escitalopram oxalate 20 mg tablet Commonly known as:  Kings[place] Roof TAKE 1 TABLET DAILY  
  
 levothyroxine 50 mcg tablet Commonly known as:  synthroid Take 1 Tab by mouth Daily (before breakfast) for 90 days. losartan-hydroCHLOROthiazide 100-25 mg per tablet Commonly known as:  HYZAAR Take 1 Tab by mouth daily for 90 days. montelukast 10 mg tablet Commonly known as:  SINGULAIR Take 1 Tab by mouth daily. multivitamin tablet Commonly known as:  ONE A DAY Take 1 Tab by mouth daily. omega 3-DHA-EPA-fish oil 1,000 mg (120 mg-180 mg) capsule Take  by mouth. SUMAtriptan 100 mg tablet Commonly known as:  IMITREX Take once daily as needed for migraine VITAMIN C 500 mg tablet Generic drug:  ascorbic acid (vitamin C) Take  by mouth.  
  
 zolpidem 5 mg tablet Commonly known as:  AMBIEN Take 1 Tab by mouth nightly as needed for Sleep for up to 30 days.  Max Daily Amount: 5 mg. Indications: SLEEP-ONSET INSOMNIA Patient Instructions Pelvic Prolapse: Before Your Surgery What is surgery for pelvic prolapse? Your pelvic muscles hold your pelvic organs in place. If they become weak, your uterus, bowel, or bladder may press against your vagina. This is called a pelvic prolapse. Surgery puts your organ back in place. It also adds support to your muscles. You will be asleep during the surgery. You will not feel pain. The doctor can do the surgery in two ways. In open surgery, the doctor makes a cut in your belly or inside the vagina. The cut is called an incision. In laparoscopic surgery, the doctor puts a lighted tube and other surgical tools through small incisions near your belly button and groin. This tube is called a scope. It lets the doctor see your organs. If you have open surgery, you will go home in 1 to 4 days. It usually takes about 4 to 6 weeks to fully recover. If you have laparoscopic surgery, you will go home in 1 or 2 days. It usually takes 1 to 2 weeks to fully recover. At home, you may need to wear a catheter. This is a tube in your bladder. It carries urine out of your body. After surgery, you may have less pain during sex. The surgery may also help with any bladder or bowel problems you may have had. If you still have your uterus, your ability to have children will not be affected. Follow-up care is a key part of your treatment and safety. Be sure to make and go to all appointments, and call your doctor if you are having problems. It's also a good idea to know your test results and keep a list of the medicines you take. What happens before surgery? ?Surgery can be stressful. This information will help you understand what you can expect. And it will help you safely prepare for surgery. ? Preparing for surgery ? · Understand exactly what surgery is planned, along with the risks, benefits, and other options. · Tell your doctors ALL the medicines, vitamins, supplements, and herbal remedies you take. Some of these can increase the risk of bleeding or interact with anesthesia. ? · If you take blood thinners, such as warfarin (Coumadin), clopidogrel (Plavix), or aspirin, be sure to talk to your doctor. He or she will tell you if you should stop taking these medicines before your surgery. Make sure that you understand exactly what your doctor wants you to do.  
? · Your doctor will tell you which medicines to take or stop before your surgery. You may need to stop taking certain medicines a week or more before surgery. So talk to your doctor as soon as you can.  
? · If you have an advance directive, let your doctor know. It may include a living will and a durable power of  for health care. Bring a copy to the hospital. If you don't have one, you may want to prepare one. It lets your doctor and loved ones know your health care wishes. Doctors advise that everyone prepare these papers before any type of surgery or procedure. ? · You may need to take a laxative or enema before surgery. Your doctor will tell you how to do this. What happens on the day of surgery? · Follow the instructions exactly about when to stop eating and drinking. If you don't, your surgery may be canceled. If your doctor told you to take your medicines on the day of surgery, take them with only a sip of water. ? · Take a bath or shower before you come in for your surgery. Do not apply lotions, perfumes, deodorants, or nail polish. ? · Do not shave the surgical site yourself. ? · Take off all jewelry and piercings. And take out contact lenses, if you wear them. ? At the hospital or surgery center · Bring a picture ID. ? · The area for surgery is often marked to make sure there are no errors. ? · You will be kept comfortable and safe by your anesthesia provider. You will be asleep during the surgery. ? · The surgery will take 45 minutes to 2 hours. Going home · Be sure you have someone to drive you home. Anesthesia and pain medicine make it unsafe for you to drive. ? · You will be given more specific instructions about recovering from your surgery. They will cover things like diet, wound care, follow-up care, driving, and getting back to your normal routine. When should you call your doctor? · You have questions or concerns. ? · You don't understand how to prepare for your surgery. ? · You become ill before the surgery (such as fever, flu, or a cold). ? · You need to reschedule or have changed your mind about having the surgery. Where can you learn more? Go to http://charan-kamala.info/. Enter B768 in the search box to learn more about \"Pelvic Prolapse: Before Your Surgery. \" Current as of: October 13, 2016 Content Version: 11.4 © 9086-6085 [employer]. Care instructions adapted under license by [employer] (which disclaims liability or warranty for this information). If you have questions about a medical condition or this instruction, always ask your healthcare professional. [place] [address] any warranty or liability for your use of this information. Introducing [facility]! Dear Meliton Conroy: 
Thank you for requesting a NurseBuddy account. Our records indicate that you already have an active NurseBuddy account. You can access your account anytime at [email or web address] Did you know that you can access your hospital and ER discharge instructions at any time in NurseBuddy? You can also review all of your test results from your hospital stay or ER visit. Additional Information If you have questions, please visit the Frequently Asked Questions section of the NurseBuddy website at [email or web address] Remember, NurseBuddy is NOT to be used for urgent needs. For medical emergencies, dial 911. Now available from your iPhone and Android! Please provide this summary of care documentation to your next provider. Your primary care clinician is listed as Miguel Pederson. If you have any questions after today's visit, please call [phone].

## 2018-10-02 DIAGNOSIS — G43.809 OTHER MIGRAINE WITHOUT STATUS MIGRAINOSUS, NOT INTRACTABLE: ICD-10-CM

## 2018-10-02 DIAGNOSIS — J45.41 MODERATE PERSISTENT ASTHMA WITH ACUTE EXACERBATION: ICD-10-CM

## 2018-10-02 DIAGNOSIS — F32.89 OTHER DEPRESSION: ICD-10-CM

## 2018-10-02 RX ORDER — ESCITALOPRAM OXALATE 20 MG/1
TABLET ORAL
Qty: 90 TAB | Refills: 0 | Status: SHIPPED | OUTPATIENT
Start: 2018-10-02 | End: 2018-12-03 | Stop reason: SDUPTHER

## 2018-10-02 RX ORDER — FLUTICASONE FUROATE AND VILANTEROL TRIFENATATE 200; 25 UG/1; UG/1
1 POWDER RESPIRATORY (INHALATION) DAILY
Qty: 3 INHALER | Refills: 0 | Status: SHIPPED | OUTPATIENT
Start: 2018-10-02 | End: 2018-12-03 | Stop reason: SDUPTHER

## 2018-10-02 RX ORDER — ALBUTEROL SULFATE 90 UG/1
2 AEROSOL, METERED RESPIRATORY (INHALATION)
Qty: 3 INHALER | Refills: 0 | Status: SHIPPED | OUTPATIENT
Start: 2018-10-02 | End: 2018-12-01 | Stop reason: SDUPTHER

## 2018-10-02 RX ORDER — SUMATRIPTAN 100 MG/1
TABLET, FILM COATED ORAL
Qty: 36 TAB | Refills: 0 | Status: SHIPPED | OUTPATIENT
Start: 2018-10-02 | End: 2018-12-01 | Stop reason: SDUPTHER

## 2018-10-02 NOTE — TELEPHONE ENCOUNTER
From: Jhoan Anderson  To: Huong Joaquin MD  Sent: 10/2/2018 9:27 AM EDT  Subject: Medication Renewal Request    Original authorizing provider: MD Moreno Dozier.  Kvng Walker would like a refill of the following medications:  albuterol (PROAIR HFA) 90 mcg/actuation inhaler [Sariah Castro MD]  BREO ELLIPTA 200-25 mcg/dose inhaler [Sariah Castro MD]  escitalopram oxalate (LEXAPRO) 20 mg tablet [Sariah Castro MD]  SUMAtriptan (IMITREX) 100 mg tablet Huong Joaquin MD]    Preferred pharmacy: [facility] [place] [address] [place] [address] [place] [address]    Comment:

## 2018-12-01 DIAGNOSIS — J45.41 MODERATE PERSISTENT ASTHMA WITH ACUTE EXACERBATION: ICD-10-CM

## 2018-12-01 DIAGNOSIS — G43.809 OTHER MIGRAINE WITHOUT STATUS MIGRAINOSUS, NOT INTRACTABLE: ICD-10-CM

## 2018-12-01 DIAGNOSIS — I10 ESSENTIAL HYPERTENSION: ICD-10-CM

## 2018-12-01 DIAGNOSIS — E03.9 HYPOTHYROIDISM, ADULT: ICD-10-CM

## 2018-12-01 DIAGNOSIS — F32.89 OTHER DEPRESSION: ICD-10-CM

## 2018-12-03 DIAGNOSIS — F32.89 OTHER DEPRESSION: ICD-10-CM

## 2018-12-03 DIAGNOSIS — I10 ESSENTIAL HYPERTENSION: ICD-10-CM

## 2018-12-03 DIAGNOSIS — G43.809 OTHER MIGRAINE WITHOUT STATUS MIGRAINOSUS, NOT INTRACTABLE: ICD-10-CM

## 2018-12-03 DIAGNOSIS — E78.2 MIXED HYPERLIPIDEMIA: ICD-10-CM

## 2018-12-03 DIAGNOSIS — J01.10 ACUTE NON-RECURRENT FRONTAL SINUSITIS: ICD-10-CM

## 2018-12-03 DIAGNOSIS — E03.9 HYPOTHYROIDISM, ADULT: ICD-10-CM

## 2018-12-03 DIAGNOSIS — J45.41 MODERATE PERSISTENT ASTHMA WITH ACUTE EXACERBATION: ICD-10-CM

## 2018-12-03 RX ORDER — ZOLPIDEM TARTRATE 5 MG/1
5 TABLET ORAL
Qty: 30 TAB | Refills: 0 | Status: SHIPPED | OUTPATIENT
Start: 2018-12-03 | End: 2018-12-05 | Stop reason: ALTCHOICE

## 2018-12-03 RX ORDER — SUMATRIPTAN 100 MG/1
TABLET, FILM COATED ORAL
Qty: 36 TAB | Refills: 0 | Status: SHIPPED | OUTPATIENT
Start: 2018-12-03 | End: 2018-12-05 | Stop reason: ALTCHOICE

## 2018-12-03 RX ORDER — ALBUTEROL SULFATE 90 UG/1
2 AEROSOL, METERED RESPIRATORY (INHALATION)
Qty: 3 INHALER | Refills: 0 | Status: SHIPPED | OUTPATIENT
Start: 2018-12-03 | End: 2018-12-05 | Stop reason: ALTCHOICE

## 2018-12-03 RX ORDER — LEVOTHYROXINE SODIUM 50 UG/1
50 TABLET ORAL
Qty: 90 TAB | Refills: 0 | Status: SHIPPED | OUTPATIENT
Start: 2018-12-03 | End: 2018-12-05 | Stop reason: ALTCHOICE

## 2018-12-03 RX ORDER — LOSARTAN POTASSIUM AND HYDROCHLOROTHIAZIDE 25; 100 MG/1; MG/1
1 TABLET ORAL DAILY
Qty: 90 TAB | Refills: 0 | Status: SHIPPED | OUTPATIENT
Start: 2018-12-03 | End: 2018-12-04 | Stop reason: ALTCHOICE

## 2018-12-03 RX ORDER — LOSARTAN POTASSIUM AND HYDROCHLOROTHIAZIDE 25; 100 MG/1; MG/1
1 TABLET ORAL DAILY
Qty: 90 TAB | Refills: 0 | OUTPATIENT
Start: 2018-12-03 | End: 2019-03-03

## 2018-12-04 ENCOUNTER — TELEPHONE (OUTPATIENT)
Dept: INTERNAL MEDICINE CLINIC | Age: 68
End: 2018-12-04

## 2018-12-04 DIAGNOSIS — I10 ESSENTIAL HYPERTENSION: Primary | ICD-10-CM

## 2018-12-04 RX ORDER — HYDROCHLOROTHIAZIDE 25 MG/1
25 TABLET ORAL DAILY
Qty: 30 TAB | Refills: 1 | Status: SHIPPED | OUTPATIENT
Start: 2018-12-04 | End: 2019-02-28 | Stop reason: ALTCHOICE

## 2018-12-04 RX ORDER — OLMESARTAN MEDOXOMIL 20 MG/1
20 TABLET ORAL DAILY
Qty: 30 TAB | Refills: 1 | Status: SHIPPED | OUTPATIENT
Start: 2018-12-04 | End: 2019-02-28 | Stop reason: ALTCHOICE

## 2018-12-04 NOTE — TELEPHONE ENCOUNTER
----- Message from Monty Epstein sent at 12/4/2018  9:15 AM EST -----  Regarding: Dr. Jordon Pineda  Pt requesting a call back in regards to Rx Losartan. She says the Rx is on recall and she would like further instructions on what bp medication she is to take in its place. Best contact [phone].

## 2018-12-05 RX ORDER — LOSARTAN POTASSIUM AND HYDROCHLOROTHIAZIDE 25; 100 MG/1; MG/1
1 TABLET ORAL DAILY
Qty: 90 TAB | Refills: 0 | OUTPATIENT
Start: 2018-12-05 | End: 2019-03-05

## 2018-12-05 RX ORDER — MONTELUKAST SODIUM 10 MG/1
10 TABLET ORAL DAILY
Qty: 90 TAB | Refills: 3 | Status: SHIPPED | OUTPATIENT
Start: 2018-12-05 | End: 2019-04-15 | Stop reason: SDUPTHER

## 2018-12-05 RX ORDER — FLUTICASONE FUROATE AND VILANTEROL TRIFENATATE 200; 25 UG/1; UG/1
1 POWDER RESPIRATORY (INHALATION) DAILY
Qty: 3 INHALER | Refills: 0 | Status: SHIPPED | OUTPATIENT
Start: 2018-12-05 | End: 2019-04-15 | Stop reason: SDUPTHER

## 2018-12-05 RX ORDER — SUMATRIPTAN 100 MG/1
TABLET, FILM COATED ORAL
Qty: 36 TAB | Refills: 0 | Status: SHIPPED | OUTPATIENT
Start: 2018-12-05 | End: 2019-04-15 | Stop reason: SDUPTHER

## 2018-12-05 RX ORDER — ESCITALOPRAM OXALATE 20 MG/1
TABLET ORAL
Qty: 90 TAB | Refills: 0 | Status: SHIPPED | OUTPATIENT
Start: 2018-12-05 | End: 2019-04-15 | Stop reason: SDUPTHER

## 2018-12-05 RX ORDER — ALBUTEROL SULFATE 90 UG/1
2 AEROSOL, METERED RESPIRATORY (INHALATION)
Qty: 3 INHALER | Refills: 0 | Status: SHIPPED | OUTPATIENT
Start: 2018-12-05 | End: 2019-04-15 | Stop reason: SDUPTHER

## 2018-12-05 RX ORDER — LEVOTHYROXINE SODIUM 50 UG/1
50 TABLET ORAL
Qty: 90 TAB | Refills: 0 | Status: SHIPPED | OUTPATIENT
Start: 2018-12-05 | End: 2019-02-28 | Stop reason: SDUPTHER

## 2018-12-05 RX ORDER — ATORVASTATIN CALCIUM 40 MG/1
40 TABLET, FILM COATED ORAL
Qty: 90 TAB | Refills: 0 | Status: SHIPPED | OUTPATIENT
Start: 2018-12-05 | End: 2019-04-15 | Stop reason: SDUPTHER

## 2018-12-05 RX ORDER — ZOLPIDEM TARTRATE 5 MG/1
5 TABLET ORAL
Qty: 30 TAB | Refills: 0 | Status: SHIPPED | OUTPATIENT
Start: 2018-12-05 | End: 2019-04-15 | Stop reason: SDUPTHER

## 2018-12-05 NOTE — TELEPHONE ENCOUNTER
I spoke with patient to advise of the below message. She verbalized understanding and appt has been scheduled.

## 2018-12-27 ENCOUNTER — HOSPITAL ENCOUNTER (OUTPATIENT)
Dept: GENERAL RADIOLOGY | Age: 68
Discharge: HOME | End: 2018-12-27
Attending: ALLERGY & IMMUNOLOGY
Payer: MEDICARE

## 2018-12-27 DIAGNOSIS — R05.3 CHRONIC COUGH: ICD-10-CM

## 2018-12-27 PROCEDURE — 71046 X-RAY EXAM CHEST 2 VIEWS: CPT

## 2019-01-11 ENCOUNTER — HOSPITAL ENCOUNTER (OUTPATIENT)
Dept: MAMMOGRAPHY | Age: 69
Discharge: HOME | End: 2019-01-11
Attending: INTERNAL MEDICINE
Payer: MEDICARE

## 2019-01-11 DIAGNOSIS — Z12.31 VISIT FOR SCREENING MAMMOGRAM: ICD-10-CM

## 2019-01-11 PROCEDURE — 77067 SCR MAMMO BI INCL CAD: CPT

## 2019-02-28 ENCOUNTER — OFFICE VISIT (OUTPATIENT)
Dept: INTERNAL MEDICINE CLINIC | Age: 69
End: 2019-02-28

## 2019-02-28 DIAGNOSIS — E03.9 HYPOTHYROIDISM, ADULT: ICD-10-CM

## 2019-02-28 DIAGNOSIS — J02.9 PHARYNGITIS, UNSPECIFIED ETIOLOGY: Primary | ICD-10-CM

## 2019-02-28 DIAGNOSIS — J01.10 ACUTE NON-RECURRENT FRONTAL SINUSITIS: ICD-10-CM

## 2019-02-28 DIAGNOSIS — I10 ESSENTIAL HYPERTENSION: ICD-10-CM

## 2019-02-28 DIAGNOSIS — J11.1 FLU SYNDROME: ICD-10-CM

## 2019-02-28 LAB
FLUAV+FLUBV AG NOSE QL IA.RAPID: NEGATIVE POS/NEG
FLUAV+FLUBV AG NOSE QL IA.RAPID: NEGATIVE POS/NEG
S PYO AG THROAT QL: NEGATIVE
VALID INTERNAL CONTROL?: YES
VALID INTERNAL CONTROL?: YES

## 2019-02-28 RX ORDER — LOSARTAN POTASSIUM AND HYDROCHLOROTHIAZIDE 25; 100 MG/1; MG/1
1 TABLET ORAL DAILY
Qty: 90 TAB | Refills: 3 | Status: SHIPPED | OUTPATIENT
Start: 2019-02-28 | End: 2019-07-25 | Stop reason: SDUPTHER

## 2019-02-28 RX ORDER — PREDNISONE 20 MG/1
TABLET ORAL
Qty: 18 TAB | Refills: 0 | Status: SHIPPED | OUTPATIENT
Start: 2019-02-28 | End: 2019-08-08 | Stop reason: ALTCHOICE

## 2019-02-28 RX ORDER — LEVOTHYROXINE SODIUM 50 UG/1
50 TABLET ORAL
Qty: 90 TAB | Refills: 3 | Status: SHIPPED | OUTPATIENT
Start: 2019-02-28 | End: 2019-07-25 | Stop reason: SDUPTHER

## 2019-02-28 RX ORDER — DOXYCYCLINE 100 MG/1
100 TABLET ORAL 2 TIMES DAILY
Qty: 20 TAB | Refills: 0 | Status: SHIPPED | OUTPATIENT
Start: 2019-02-28 | End: 2019-08-08 | Stop reason: ALTCHOICE

## 2019-02-28 NOTE — PATIENT INSTRUCTIONS
Influenza (Flu): Care Instructions Your Care Instructions Influenza (flu) is an infection in the lungs and breathing passages. It is caused by the influenza virus. There are different strains, or types, of the flu virus from year to year. Unlike the common cold, the flu comes on suddenly and the symptoms, such as a cough, congestion, fever, chills, fatigue, aches, and pains, are more severe. These symptoms may last up to 10 days. Although the flu can make you feel very sick, it usually doesn't cause serious health problems. Home treatment is usually all you need for flu symptoms. But your doctor may prescribe antiviral medicine to prevent other health problems, such as pneumonia, from developing. Older people and those who have a long-term health condition, such as lung disease, are most at risk for having pneumonia or other health problems. Follow-up care is a key part of your treatment and safety. Be sure to make and go to all appointments, and call your doctor if you are having problems. It's also a good idea to know your test results and keep a list of the medicines you take. How can you care for yourself at home? · Get plenty of rest. 
· Drink plenty of fluids, enough so that your urine is light yellow or clear like water. If you have kidney, heart, or liver disease and have to limit fluids, talk with your doctor before you increase the amount of fluids you drink. · Take an over-the-counter pain medicine if needed, such as acetaminophen (Tylenol), ibuprofen (Advil, Motrin), or naproxen (Aleve), to relieve fever, headache, and muscle aches. Read and follow all instructions on the label. No one younger than 20 should take aspirin. It has been linked to Reye syndrome, a serious illness. · Do not smoke. Smoking can make the flu worse. If you need help quitting, talk to your doctor about stop-smoking programs and medicines. These can increase your chances of quitting for good. · Breathe moist air from a hot shower or from a sink filled with hot water to help clear a stuffy nose. · Before you use cough and cold medicines, check the label. These medicines may not be safe for young children or for people with certain health problems. · If the skin around your nose and lips becomes sore, put some petroleum jelly on the area. · To ease coughing: ? Drink fluids to soothe a scratchy throat. ? Suck on cough drops or plain hard candy. ? Take an over-the-counter cough medicine that contains dextromethorphan to help you get some sleep. Read and follow all instructions on the label. ? Raise your head at night with an extra pillow. This may help you rest if coughing keeps you awake. · Take any prescribed medicine exactly as directed. Call your doctor if you think you are having a problem with your medicine. To avoid spreading the flu · Wash your hands regularly, and keep your hands away from your face. · Stay home from school, work, and other public places until you are feeling better and your fever has been gone for at least 24 hours. The fever needs to have gone away on its own without the help of medicine. · Ask people living with you to talk to their doctors about preventing the flu. They may get antiviral medicine to keep from getting the flu from you. · To prevent the flu in the future, get a flu vaccine every fall. Encourage people living with you to get the vaccine. · Cover your mouth when you cough or sneeze. When should you call for help? Call 911 anytime you think you may need emergency care.  For example, call if: 
  · You have severe trouble breathing.  
 Call your doctor now or seek immediate medical care if: 
  · You have new or worse trouble breathing.  
  · You seem to be getting much sicker.  
  · You feel very sleepy or confused.  
  · You have a new or higher fever.  
  · You get a new rash.  
 Watch closely for changes in your health, and be sure to contact your doctor if: 
  · You begin to get better and then get worse.  
  · You are not getting better after 1 week. Where can you learn more? Go to http://charan-kamala.info/. Enter X150 in the search box to learn more about \"Influenza (Flu): Care Instructions. \" Current as of: September 5, 2018 Content Version: 11.9 © 4319-2800 [employer]. Care instructions adapted under license by [employer] (which disclaims liability or warranty for this information). If you have questions about a medical condition or this instruction, always ask your healthcare professional. [place] [address] any warranty or liability for your use of this information.

## 2019-02-28 NOTE — PROGRESS NOTES
Chief Complaint Patient presents with  Sinus Pain  Sore Throat  Nasal Congestion Flulike symptoms Patient reports she has an acute onset of achy joints, sore throat, generalized malaise and congestion. She does not have a headache or fever. She is not taking over-the-counter but is taking vitamin C. She is using her inhaler right now. Sinusitis Patient reports a pressure in her head. She notes that if she does not get on antibiotics a little progressively get worse. Asthma Current control: Fair Current level: mild intermittent Current symptoms: cough night cough decreased exercise tolerance Current controller: Brio Last flare up: Now. Number of flareups in past year:1 associated with sinus infections Chest tight but not wheezing Thyroid Disease: Darrick Hastings is a 68 y.o. female here for follow up of hypothyroidism. Lab Results Component Value Date/Time TSH 4.310 01/17/2018 01:47 PM  
 
Residual symptoms denies fatigue, weight changes, heat/cold intolerance, bowel/skin changes or CVS symptoms. she denies denies fatigue, weight changes, heat/cold intolerance, bowel/skin changes or CVS symptoms Thyroid medication has been unchanged since last medication check and labs. Endocrine 10/2018 Tsh wnl per endocrine. Patient was seen by endocrinology to workup her fatigue. Her TSH was normal. 
 
 
Subjective: Darrick Hastings is a 68 y.o. female with hypertension. Hypertension ROS: taking medications as instructed, no medication side effects noted, no TIA's, no chest pain on exertion, no dyspnea on exertion, no swelling of ankles. New concerns: None. 10/2018 lab CMP was wnl by endocrine Past Medical History:  
Diagnosis Date  Arthritis  Asthma  Elevated lipids 9/15/2015  Essential hypertension 9/15/2015  Hypercholesterolemia  Hypothyroidism, adult 9/15/2015  Migraine   
 allergy mediated in sping and fall  Sun-damaged skin Past Surgical History:  
Procedure Laterality Date  HX BREAST BIOPSY Left yrs ago  
 neg; surgical bx  HX  SECTION    
 HX CHOLECYSTECTOMY  HX CYST INCISION AND DRAINAGE Left over years  
 neg  HX KNEE ARTHROSCOPY Right   
 x 2  
 HX SHOULDER ARTHROSCOPY Left  HX TONSILLECTOMY Social History Socioeconomic History  Marital status:  Spouse name: Not on file  Number of children: Not on file  Years of education: Not on file  Highest education level: Not on file Tobacco Use  Smoking status: Never Smoker  Smokeless tobacco: Never Used Substance and Sexual Activity  Alcohol use: Yes Alcohol/week: 3.5 oz Types: 7 Glasses of wine per week  Drug use: No  
 Sexual activity: Yes  
  Partners: Male Social History Narrative Retired from [employer] in [place] Stressful Hx of situational stress  passed cancer duodenal  
 Mother was sick 79 yo with MS actually did well Recently   In 2017  
   
 1 son, doing well Family History Problem Relation Age of Onset  MS Mother  Cancer Mother   
     uterus/skin cancer  Cancer Father 72  
     colon  Parkinson's Disease Sister 61  MS Brother  Parkinson's Disease Brother Current Outpatient Medications Medication Sig Dispense Refill  atorvastatin (LIPITOR) 40 mg tablet Take 1 Tab by mouth nightly for 90 days. 90 Tab 0  
 levothyroxine (SYNTHROID) 50 mcg tablet Take 1 Tab by mouth Daily (before breakfast) for 90 days. 90 Tab 0  
 montelukast (SINGULAIR) 10 mg tablet Take 1 Tab by mouth daily. 90 Tab 3  
 albuterol (PROAIR HFA) 90 mcg/actuation inhaler Take 2 Puffs by inhalation every four (4) hours as needed for Wheezing or Shortness of Breath for up to 90 days. 3 Inhaler 0  
 BREO ELLIPTA 200-25 mcg/dose inhaler Take 1 Puff by inhalation daily for 90 days.  3 Inhaler 0  
  escitalopram oxalate (LEXAPRO) 20 mg tablet TAKE 1 TABLET DAILY 90 Tab 0  
 SUMAtriptan (IMITREX) 100 mg tablet Take once daily as needed for migraine 36 Tab 0  
 Omega-3-DHA-EPA-Fish Oil 1,000 mg (120 mg-180 mg) cap Take  by mouth.  multivitamin (ONE A DAY) tablet Take 1 Tab by mouth daily.  ascorbic acid (VITAMIN C) 500 mg tablet Take  by mouth.  zolpidem (AMBIEN) 5 mg tablet Take 1 Tab by mouth nightly as needed for Sleep for up to 30 days. Max Daily Amount: 5 mg. 30 Tab 0  
 olmesartan (BENICAR) 20 mg tablet Take 1 Tab by mouth daily. 30 Tab 1  
 hydroCHLOROthiazide (HYDRODIURIL) 25 mg tablet Take 1 Tab by mouth daily. 30 Tab 1  
 losartan-hydroCHLOROthiazide (HYZAAR) 100-25 mg per tablet Take 1 Tab by mouth daily for 90 days. 90 Tab 0  
 azelastine (ASTELIN) 137 mcg (0.1 %) nasal spray Allergies Allergen Reactions  Sulfa (Sulfonamide Antibiotics) Anaphylaxis Review of Systems - General ROS: positive for  - malaise and sleep disturbance 
negative for - fever Cardiovascular ROS: no chest pain or dyspnea on exertion Respiratory ROS: positive for - cough and shortness of breath 
negative for - stridor or tachypnea Visit Vitals LMP 03/15/1998 General Appearance:  Well developed, well nourished,alert and oriented x 3, and individual in no acute distress. Ears/Nose/Mouth/Throat:   Hearing grossly normal. 
  
    Neck: Supple, no lad, no bruits Chest:   Lungs clear to auscultation bilaterally. Cardiovascular:  Regular rate and rhythm, S1, S2 normal, no murmur. Abdomen:   Soft, non-tender, bowel sounds are active. Extremities: No edema bilaterally. Skin: Warm and dry, no suspicious lesions, hyperpigmented macules on face from dermatology Diagnoses and all orders for this visit: 1. Pharyngitis, unspecified etiology Strep negative conservative care -     AMB POC RAPID STREP A 
 
2. Flu syndrome Flu negative it appears patient does have a viral syndrome treat conservatively -     AMB POC JEREMIAS INFLUENZA A/B TEST 3. Acute non-recurrent frontal sinusitis We will treat with prednisone and doxy as she has improved in the past with this -     predniSONE (DELTASONE) 20 mg tablet; Take 3 tabs daily x 3 days then 2 tabs daily x 3 days then 1 tab daily x 2 days, 1/2 tab daily x 2 days 4. Hypothyroidism, adult Labs were done in endocrinologist.  They were within normal limits -     levothyroxine (SYNTHROID) 50 mcg tablet; Take 1 Tab by mouth Daily (before breakfast) for 90 days. 5. Essential hypertension Labs were done with an endocrinologist.  I asked patient to pull these for me for our records 
-     losartan-hydroCHLOROthiazide (HYZAAR) 100-25 mg per tablet; Take 1 Tab by mouth daily for 90 days. Other orders 
-     doxycycline (ADOXA) 100 mg tablet; Take 1 Tab by mouth two (2) times a day.

## 2019-07-25 DIAGNOSIS — I10 ESSENTIAL HYPERTENSION: ICD-10-CM

## 2019-07-25 DIAGNOSIS — E03.9 HYPOTHYROIDISM, ADULT: ICD-10-CM

## 2019-07-25 DIAGNOSIS — J45.41 MODERATE PERSISTENT ASTHMA WITH ACUTE EXACERBATION: ICD-10-CM

## 2019-07-25 DIAGNOSIS — E78.2 MIXED HYPERLIPIDEMIA: ICD-10-CM

## 2019-07-25 DIAGNOSIS — G43.809 OTHER MIGRAINE WITHOUT STATUS MIGRAINOSUS, NOT INTRACTABLE: ICD-10-CM

## 2019-07-25 DIAGNOSIS — F32.89 OTHER DEPRESSION: ICD-10-CM

## 2019-07-25 DIAGNOSIS — J01.10 ACUTE NON-RECURRENT FRONTAL SINUSITIS: ICD-10-CM

## 2019-07-25 RX ORDER — MONTELUKAST SODIUM 10 MG/1
10 TABLET ORAL DAILY
Qty: 90 TAB | Refills: 3 | Status: SHIPPED | OUTPATIENT
Start: 2019-07-25 | End: 2019-08-08 | Stop reason: SDUPTHER

## 2019-07-25 RX ORDER — ESCITALOPRAM OXALATE 20 MG/1
TABLET ORAL
Qty: 90 TAB | Refills: 0 | Status: SHIPPED | OUTPATIENT
Start: 2019-07-25 | End: 2019-08-08 | Stop reason: SDUPTHER

## 2019-07-25 RX ORDER — LOSARTAN POTASSIUM AND HYDROCHLOROTHIAZIDE 25; 100 MG/1; MG/1
1 TABLET ORAL DAILY
Qty: 90 TAB | Refills: 3 | Status: SHIPPED | OUTPATIENT
Start: 2019-07-25 | End: 2019-08-08 | Stop reason: SDUPTHER

## 2019-07-25 RX ORDER — FLUTICASONE FUROATE AND VILANTEROL TRIFENATATE 200; 25 UG/1; UG/1
1 POWDER RESPIRATORY (INHALATION) DAILY
Qty: 3 INHALER | Refills: 0 | Status: SHIPPED | OUTPATIENT
Start: 2019-07-25 | End: 2019-08-08 | Stop reason: SDUPTHER

## 2019-07-25 RX ORDER — ALBUTEROL SULFATE 90 UG/1
2 AEROSOL, METERED RESPIRATORY (INHALATION)
Qty: 3 INHALER | Refills: 0 | Status: SHIPPED | OUTPATIENT
Start: 2019-07-25 | End: 2019-10-22 | Stop reason: SDUPTHER

## 2019-07-25 RX ORDER — ZOLPIDEM TARTRATE 5 MG/1
5 TABLET ORAL
Qty: 25 TAB | Refills: 0 | OUTPATIENT
Start: 2019-07-25 | End: 2019-10-22 | Stop reason: SDUPTHER

## 2019-07-25 RX ORDER — PREDNISONE 20 MG/1
TABLET ORAL
Qty: 18 TAB | Refills: 0 | OUTPATIENT
Start: 2019-07-25

## 2019-07-25 RX ORDER — SUMATRIPTAN 100 MG/1
TABLET, FILM COATED ORAL
Qty: 36 TAB | Refills: 0 | Status: SHIPPED | OUTPATIENT
Start: 2019-07-25 | End: 2019-08-08 | Stop reason: SDUPTHER

## 2019-07-25 RX ORDER — LEVOTHYROXINE SODIUM 50 UG/1
50 TABLET ORAL
Qty: 90 TAB | Refills: 3 | Status: SHIPPED | OUTPATIENT
Start: 2019-07-25 | End: 2019-08-08 | Stop reason: SDUPTHER

## 2019-07-25 RX ORDER — ATORVASTATIN CALCIUM 40 MG/1
40 TABLET, FILM COATED ORAL
Qty: 90 TAB | Refills: 0 | Status: SHIPPED | OUTPATIENT
Start: 2019-07-25 | End: 2019-08-08 | Stop reason: SDUPTHER

## 2019-07-26 NOTE — TELEPHONE ENCOUNTER
Rx below called into local pharmacy per Dr Rox Sutton     zolpidem [facility] [employer] [facility] [place]) 5 mg tablet          Sig: Take 1 Tab by mouth nightly as needed for Sleep for up to 20 days. Max Daily Amount: 5 mg.  Indications: Difficulty Falling Asleep    Disp:  25 Tab    Refills:  0    Start: 7/25/2019 - 8/14/2019    Class: Phone In    Authorized by: Godfrey Narvaez MD    For: Other depression

## 2019-08-08 ENCOUNTER — OFFICE VISIT (OUTPATIENT)
Dept: INTERNAL MEDICINE CLINIC | Age: 69
End: 2019-08-08

## 2019-08-08 ENCOUNTER — HOSPITAL ENCOUNTER (OUTPATIENT)
Dept: LAB | Age: 69
Discharge: HOME | End: 2019-08-08
Payer: MEDICARE

## 2019-08-08 VITALS
TEMPERATURE: 98.6 F | RESPIRATION RATE: 14 BRPM | OXYGEN SATURATION: 94 % | WEIGHT: 179.8 LBS | BODY MASS INDEX: 27.25 KG/M2 | HEIGHT: 68 IN | DIASTOLIC BLOOD PRESSURE: 75 MMHG | HEART RATE: 95 BPM | SYSTOLIC BLOOD PRESSURE: 117 MMHG

## 2019-08-08 DIAGNOSIS — E55.9 VITAMIN D DEFICIENCY: ICD-10-CM

## 2019-08-08 DIAGNOSIS — F32.89 OTHER DEPRESSION: ICD-10-CM

## 2019-08-08 DIAGNOSIS — Z13.39 SCREENING FOR ALCOHOLISM: ICD-10-CM

## 2019-08-08 DIAGNOSIS — F43.9 SITUATIONAL STRESS: ICD-10-CM

## 2019-08-08 DIAGNOSIS — M94.9 DISORDER OF BONE AND CARTILAGE: ICD-10-CM

## 2019-08-08 DIAGNOSIS — M89.9 DISORDER OF BONE AND CARTILAGE: ICD-10-CM

## 2019-08-08 DIAGNOSIS — E78.2 MIXED HYPERLIPIDEMIA: ICD-10-CM

## 2019-08-08 DIAGNOSIS — I10 ESSENTIAL HYPERTENSION: ICD-10-CM

## 2019-08-08 DIAGNOSIS — Z12.11 SCREEN FOR COLON CANCER: ICD-10-CM

## 2019-08-08 DIAGNOSIS — R51.9 CHRONIC INTRACTABLE HEADACHE, UNSPECIFIED HEADACHE TYPE: ICD-10-CM

## 2019-08-08 DIAGNOSIS — Z00.00 MEDICARE ANNUAL WELLNESS VISIT, SUBSEQUENT: Primary | ICD-10-CM

## 2019-08-08 DIAGNOSIS — G89.29 CHRONIC INTRACTABLE HEADACHE, UNSPECIFIED HEADACHE TYPE: ICD-10-CM

## 2019-08-08 DIAGNOSIS — J45.41 MODERATE PERSISTENT ASTHMA WITH ACUTE EXACERBATION: ICD-10-CM

## 2019-08-08 DIAGNOSIS — Z13.31 SCREENING FOR DEPRESSION: ICD-10-CM

## 2019-08-08 DIAGNOSIS — E03.9 ACQUIRED HYPOTHYROIDISM: ICD-10-CM

## 2019-08-08 PROCEDURE — 36415 COLL VENOUS BLD VENIPUNCTURE: CPT

## 2019-08-08 PROCEDURE — 82306 VITAMIN D 25 HYDROXY: CPT

## 2019-08-08 PROCEDURE — 84443 ASSAY THYROID STIM HORMONE: CPT

## 2019-08-08 PROCEDURE — 80053 COMPREHEN METABOLIC PANEL: CPT

## 2019-08-08 PROCEDURE — 85027 COMPLETE CBC AUTOMATED: CPT

## 2019-08-08 PROCEDURE — 80061 LIPID PANEL: CPT

## 2019-08-08 RX ORDER — MONTELUKAST SODIUM 10 MG/1
10 TABLET ORAL DAILY
Qty: 90 TAB | Refills: 3 | Status: SHIPPED | OUTPATIENT
Start: 2019-08-08 | End: 2020-08-27 | Stop reason: SDUPTHER

## 2019-08-08 RX ORDER — ATORVASTATIN CALCIUM 40 MG/1
40 TABLET, FILM COATED ORAL
Qty: 90 TAB | Refills: 3 | Status: SHIPPED | OUTPATIENT
Start: 2019-08-08 | End: 2021-11-01

## 2019-08-08 RX ORDER — LOSARTAN POTASSIUM AND HYDROCHLOROTHIAZIDE 25; 100 MG/1; MG/1
1 TABLET ORAL DAILY
Qty: 90 TAB | Refills: 3 | Status: SHIPPED | OUTPATIENT
Start: 2019-08-08 | End: 2020-03-16 | Stop reason: SDUPTHER

## 2019-08-08 RX ORDER — FLUTICASONE FUROATE AND VILANTEROL TRIFENATATE 200; 25 UG/1; UG/1
1 POWDER RESPIRATORY (INHALATION) DAILY
Qty: 3 INHALER | Refills: 0 | Status: SHIPPED | OUTPATIENT
Start: 2019-08-08 | End: 2019-08-14 | Stop reason: ALTCHOICE

## 2019-08-08 RX ORDER — SUMATRIPTAN 100 MG/1
TABLET, FILM COATED ORAL
Qty: 36 TAB | Refills: 0 | Status: SHIPPED | OUTPATIENT
Start: 2019-08-08 | End: 2019-10-22 | Stop reason: SDUPTHER

## 2019-08-08 RX ORDER — GABAPENTIN 100 MG/1
100 CAPSULE ORAL
Qty: 90 CAP | Refills: 1 | Status: SHIPPED | OUTPATIENT
Start: 2019-08-08 | End: 2019-12-18 | Stop reason: SDUPTHER

## 2019-08-08 RX ORDER — LEVOTHYROXINE SODIUM 50 UG/1
50 TABLET ORAL
Qty: 90 TAB | Refills: 3 | Status: SHIPPED | OUTPATIENT
Start: 2019-08-08 | End: 2020-03-16 | Stop reason: SDUPTHER

## 2019-08-08 RX ORDER — ESCITALOPRAM OXALATE 20 MG/1
TABLET ORAL
Qty: 90 TAB | Refills: 3 | Status: SHIPPED | OUTPATIENT
Start: 2019-08-08 | End: 2019-10-22 | Stop reason: SDUPTHER

## 2019-08-08 NOTE — PATIENT INSTRUCTIONS
Medicare Wellness Visit, Female The best way to live healthy is to have a lifestyle where you eat a well-balanced diet, exercise regularly, limit alcohol use, and quit all forms of tobacco/nicotine, if applicable. Regular preventive services are another way to keep healthy. Preventive services (vaccines, screening tests, monitoring & exams) can help personalize your care plan, which helps you manage your own care. Screening tests can find health problems at the earliest stages, when they are easiest to treat. [facility] follows the current, evidence-based guidelines published by the [facility] Gaudencio Andree (RUSTSTF) when recommending preventive services for our patients. Because we follow these guidelines, sometimes recommendations change over time as research supports it. (For example, mammograms used to be recommended annually. Even though Medicare will still pay for an annual mammogram, the newer guidelines recommend a mammogram every two years for women of average risk.) Of course, you and your doctor may decide to screen more often for some diseases, based on your risk and your health status. Preventive services for you include: - Medicare offers their members a free annual wellness visit, which is time for you and your primary care provider to discuss and plan for your preventive service needs. Take advantage of this benefit every year! 
-All adults over the age of 72 should receive the recommended pneumonia vaccines. Current USPSTF guidelines recommend a series of two vaccines for the best pneumonia protection.  
-All adults should have a flu vaccine yearly and a tetanus vaccine every 10 years. All adults age 61 and older should receive a shingles vaccine once in their lifetime.   
-A bone mass density test is recommended when a woman turns 65 to screen for osteoporosis. This test is only recommended one time, as a screening. Some providers will use this same test as a disease monitoring tool if you already have osteoporosis. -All adults age 38-68 who are overweight should have a diabetes screening test once every three years.  
-Other screening tests and preventive services for persons with diabetes include: an eye exam to screen for diabetic retinopathy, a kidney function test, a foot exam, and stricter control over your cholesterol.  
-Cardiovascular screening for adults with routine risk involves an electrocardiogram (ECG) at intervals determined by your doctor.  
-Colorectal cancer screenings should be done for adults age 54-65 with no increased risk factors for colorectal cancer. There are a number of acceptable methods of screening for this type of cancer. Each test has its own benefits and drawbacks. Discuss with your doctor what is most appropriate for you during your annual wellness visit. The different tests include: colonoscopy (considered the best screening method), a fecal occult blood test, a fecal DNA test, and sigmoidoscopy. -Breast cancer screenings are recommended every other year for women of normal risk, age 54-69. 
-Cervical cancer screenings for women over age 72 are only recommended with certain risk factors.  
-All adults born between [facility] should be screened once for Hepatitis C. Here is a list of your current Health Maintenance items (your personalized list of preventive services) with a due date: 
Health Maintenance Due Topic Date Due  Shingles Vaccine (1 of 2) 02/09/1998  Glaucoma Screening   08/01/2017  Stool testing for trace blood  09/27/2017  Pneumococcal Vaccine (2 of 2 - PPSV23) 10/04/2017 [place] Annual Well Visit  04/27/2018  Flu Vaccine  08/01/2019

## 2019-08-08 NOTE — PROGRESS NOTES
Patient presents for her Medicare wellness visit. She is also here to follow-up with regards to her thyroid and blood pressure. She also has been experiencing some situational stress. This is the Subsequent Medicare Annual Wellness Exam, performed 12 months or more after the Initial AWV or the last Subsequent AWV    I have reviewed the patient's medical history in detail and updated the computerized patient record. History     Past Medical History:   Diagnosis Date    Arthritis     Asthma     Elevated lipids 9/15/2015    Essential hypertension 9/15/2015    Hypercholesterolemia     Hypothyroidism, adult 9/15/2015    Migraine     allergy mediated in sping and fall    Sun-damaged skin       Past Surgical History:   Procedure Laterality Date    HX BREAST BIOPSY Left yrs ago    neg; surgical bx    HX  SECTION      HX CHOLECYSTECTOMY      HX CYST INCISION AND DRAINAGE Left over years    neg    HX KNEE ARTHROSCOPY Right     x 2    HX SHOULDER ARTHROSCOPY Left     HX TONSILLECTOMY       Current Outpatient Medications   Medication Sig Dispense Refill    pneumococcal 23-dave ps vaccine (PNEUMOVAX 23) 25 mcg/0.5 mL injection 0.5 mL by IntraMUSCular route once for 1 dose. 0.5 mL 0    levothyroxine (SYNTHROID) 50 mcg tablet Take 1 Tab by mouth Daily (before breakfast) for 25 days. appt 90 Tab 3    losartan-hydroCHLOROthiazide (HYZAAR) 100-25 mg per tablet Take 1 Tab by mouth daily for 90 days. 90 Tab 3    atorvastatin (LIPITOR) 40 mg tablet Take 1 Tab by mouth nightly for 90 days. Labs please 90 Tab 0    montelukast (SINGULAIR) 10 mg tablet Take 1 Tab by mouth daily. 90 Tab 3    zolpidem (AMBIEN) 5 mg tablet Take 1 Tab by mouth nightly as needed for Sleep for up to 20 days. Max Daily Amount: 5 mg.  Indications: Difficulty Falling Asleep 25 Tab 0    albuterol (PROAIR HFA) 90 mcg/actuation inhaler Take 2 Puffs by inhalation every four (4) hours as needed for Wheezing or Shortness of Breath for up to 90 days. 3 Inhaler 0    BREO ELLIPTA 200-25 mcg/dose inhaler Take 1 Puff by inhalation daily for 90 days. 3 Inhaler 0    escitalopram oxalate (LEXAPRO) 20 mg tablet TAKE 1 TABLET DAILY 90 Tab 0    SUMAtriptan (IMITREX) 100 mg tablet Take once daily as needed for migraine 36 Tab 0    Omega-3-DHA-EPA-Fish Oil 1,000 mg (120 mg-180 mg) cap Take  by mouth.  multivitamin (ONE A DAY) tablet Take 1 Tab by mouth daily.  ascorbic acid (VITAMIN C) 500 mg tablet Take  by mouth.  azelastine (ASTELIN) 137 mcg (0.1 %) nasal spray        Allergies   Allergen Reactions    Sulfa (Sulfonamide Antibiotics) Anaphylaxis     Family History   Problem Relation Age of Onset   [place] MS Mother     Cancer Mother         uterus/skin cancer    Cancer Father 72        colon    Parkinson's Disease Sister 61    MS Brother     Parkinson's Disease Brother      Social History     Tobacco Use    Smoking status: Never Smoker    Smokeless tobacco: Never Used   Substance Use Topics    Alcohol use: Yes     Alcohol/week: 5.8 standard drinks     Types: 7 Glasses of wine per week     Patient Active Problem List   Diagnosis Code    Hypothyroidism, adult E03.9    Essential hypertension I10    Elevated lipids E78.5    Skin lesions, generalized L98.9    Asthma J45.909    Recurrent [facility] ([facility] F33.9       Depression Risk Factor Screening:     3 most recent PHQ Screens 2/28/2019   Little interest or pleasure in doing things Not at all   Feeling down, depressed, irritable, or hopeless Not at all   Total Score PHQ 2 0     Alcohol Risk Factor Screening: You do not drink alcohol or very rarely. Patient reports she drinks 1 glass of wine per night. On occasion she will drink a little bit more with her sister. Functional Ability and Level of Safety:   Hearing Loss  Hearing is good. Activities of Daily Living  The home contains: no safety equipment.   Patient does total self care    Fall Risk  Fall Risk Assessment, last 12 mths 8/8/2019   Able to walk? Yes   Fall in past 12 months? No   Fall with injury? -   Number of falls in past 12 months -   Fall Risk Score -       Abuse Screen  Patient is not abused    Cognitive Screening   Evaluation of Cognitive Function:  Has your family/caregiver stated any concerns about your memory: no  Normal    Patient Care Team   Patient Care Team:  Rashmi Linton MD as PCP - General (Internal Medicine)  Hallie Mahoney MD as Physician (Sleep Medicine)    Assessment/Plan   Education and counseling provided:  Are appropriate based on today's review and evaluation  End-of-Life planning (with patient's consent) advanced care directive book given to patient. We briefly discussed and she will make her son the medical power of   Pneumococcal Vaccine prescription ordered  Influenza Vaccine patient gets annual vaccines   screening Mammography due for mammogram next year  Colorectal cancer screening tests patient will go for colonoscopy as she has a family history of colon cancer  Cardiovascular screening blood test cholesterol checked  Bone mass measurement (DEXA) ordered  Screening for glaucoma patient sees ophthalmology  Diagnoses and all orders for this visit:    1. Medicare annual wellness visit, subsequent  -     pneumococcal 23-dave ps vaccine (PNEUMOVAX 23) 25 mcg/0.5 mL injection; 0.5 mL by IntraMUSCular route once for 1 dose. 2. Screening for alcoholism  Will cut her wine down to 1 glass per night  -     MS ANNUAL ALCOHOL SCREEN 15 MIN    3. Screening for depression  Situational stress  -     DEPRESSION SCREEN ANNUAL    4. Screen for colon cancer  -     REFERRAL FOR COLONOSCOPY    5. Disorder of bone and cartilage  -     DEXA BONE DENSITY STUDY AXIAL; Future      Chief Complaint   Patient presents with    Complete Physical       Patient presents for her Medicare wellness but is also here to follow-up with regards to her blood pressure thyroid and also notes situational stress. Please see above for Medicare wellness visit. Asthma  Current control: Fair   Current level: mild intermittent  Current symptoms: cough night cough decreased exercise tolerance  Current controller: Breo  Last flare up: Now. Number of flareups in past year:1 associated with sinus infections  Stable she is rinsing her mouth after use      Thyroid Disease:  Ammy Villasenor is a 68 y.o. female here for follow up of hypothyroidism. Lab Results   Component Value Date/Time    TSH 4.310 01/17/2018 01:47 PM     Residual symptoms denies fatigue, weight changes, heat/cold intolerance, bowel/skin changes or CVS symptoms. she denies denies fatigue, weight changes, heat/cold intolerance, bowel/skin changes or CVS symptoms  Thyroid medication has been unchanged since last medication check and labs. She denies weight changes but has had vibratory sensations which were worked up by neurology. She notes that she has had some depression and fatigue. Subjective: Ammy Villasenor is a 68 y.o. female with hypertension. Hypertension ROS: taking medications as instructed, no medication side effects noted, no TIA's, no chest pain on exertion, no dyspnea on exertion, no swelling of ankles. New concerns: None. Hyperlipidemia  Patient denies side effects of medicines. She denies muscle aches and pain. Situational stress  Patient reports that she feels she may have some depression. She has been  to a man for 2 years who is different from when she was dating him. She reports he has a temper. She feels like she oftentimes feels like she is walking on egg shells. She notes that her [employer] friends were aware of his temper but did not tell her. She is currently on Lexapro.       Past Medical History:   Diagnosis Date    Arthritis     Asthma     Elevated lipids 9/15/2015    Essential hypertension 9/15/2015    Hypercholesterolemia     Hypothyroidism, adult 9/15/2015    Migraine     allergy mediated in sping and fall    Sun-damaged skin      Past Surgical History:   Procedure Laterality Date    HX BREAST BIOPSY Left yrs ago    neg; surgical bx    HX  SECTION      HX CHOLECYSTECTOMY      HX CYST INCISION AND DRAINAGE Left over years    neg    HX KNEE ARTHROSCOPY Right     x 2    HX SHOULDER ARTHROSCOPY Left     HX TONSILLECTOMY       Social History     Socioeconomic History    Marital status:      Spouse name: Not on file    Number of children: Not on file    Years of education: Not on file    Highest education level: Not on file   Tobacco Use    Smoking status: Never Smoker    Smokeless tobacco: Never Used   Substance and Sexual Activity    Alcohol use: Yes     Alcohol/week: 5.8 standard drinks     Types: 7 Glasses of wine per week    Drug use: No    Sexual activity: Yes     Partners: Male   Social History Narrative    Retired from [employer] in [place]    Stressful        Hx of situational stress     passed cancer duodenal    Mother was sick 81 yo with MS actually did well        Recently   In 2017        1 son, doing well     Family History   Problem Relation Age of Onset    MS Mother     Cancer Mother         uterus/skin cancer    Cancer Father 72        colon    Parkinson's Disease Sister 61    MS Brother     Parkinson's Disease Brother      Current Outpatient Medications   Medication Sig Dispense Refill    pneumococcal 23-dave ps vaccine (PNEUMOVAX 23) 25 mcg/0.5 mL injection 0.5 mL by IntraMUSCular route once for 1 dose. 0.5 mL 0    gabapentin (NEURONTIN) 100 mg capsule Take 1 Cap by mouth nightly. Max Daily Amount: 100 mg. 90 Cap 1    escitalopram oxalate (LEXAPRO) 20 mg tablet TAKE 1 TABLET DAILY 90 Tab 3    levothyroxine (SYNTHROID) 50 mcg tablet Take 1 Tab by mouth Daily (before breakfast) for 25 days. appt 90 Tab 3    losartan-hydroCHLOROthiazide (HYZAAR) 100-25 mg per tablet Take 1 Tab by mouth daily for 90 days.  90 Tab 3    atorvastatin (LIPITOR) 40 mg tablet Take 1 Tab by mouth nightly for 90 days. Labs please 90 Tab 3    montelukast (SINGULAIR) 10 mg tablet Take 1 Tab by mouth daily. 90 Tab 3    SUMAtriptan (IMITREX) 100 mg tablet Take once daily as needed for migraine 36 Tab 0    BREO ELLIPTA 200-25 mcg/dose inhaler Take 1 Puff by inhalation daily for 90 days. Rinse mouth after use 3 Inhaler 0    zolpidem (AMBIEN) 5 mg tablet Take 1 Tab by mouth nightly as needed for Sleep for up to 20 days. Max Daily Amount: 5 mg. Indications: Difficulty Falling Asleep 25 Tab 0    albuterol (PROAIR HFA) 90 mcg/actuation inhaler Take 2 Puffs by inhalation every four (4) hours as needed for Wheezing or Shortness of Breath for up to 90 days. 3 Inhaler 0    Omega-3-DHA-EPA-Fish Oil 1,000 mg (120 mg-180 mg) cap Take  by mouth.  multivitamin (ONE A DAY) tablet Take 1 Tab by mouth daily.  ascorbic acid (VITAMIN C) 500 mg tablet Take  by mouth.  azelastine (ASTELIN) 137 mcg (0.1 %) nasal spray        Allergies   Allergen Reactions    Sulfa (Sulfonamide Antibiotics) Anaphylaxis       Review of Systems - General ROS: positive for  - malaise and dysthymia  negative for - fever  Cardiovascular ROS: no chest pain or dyspnea on exertion  Respiratory ROS: positive for - cough and shortness of breath  negative for - stridor or tachypnea    Visit Vitals  /75 (BP 1 Location: Left arm, BP Patient Position: Sitting)   Pulse 95   Temp 98.6 °F (37 °C) (Oral)   Resp 14   Ht 5' 8\" (1.727 m)   Wt 179 lb 12.8 oz (81.6 kg)   LMP 03/15/1998   SpO2 94%   BMI 27.34 kg/m²     General Appearance:  Well developed, well nourished,alert and oriented x 3, and individual in no acute distress. Ears/Nose/Mouth/Throat:   Hearing grossly normal.         Neck: Supple, no lad, no bruits   Chest:   Lungs clear to auscultation bilaterally. Cardiovascular:  Regular rate and rhythm, S1, S2 normal, no murmur. Abdomen:   Soft, non-tender, bowel sounds are active. Extremities: No edema bilaterally. Skin: Warm and dry, no suspicious lesions, hyperpigmented macules on face from dermatology                 Diagnoses and all orders for this visit:    1. Medicare annual wellness visit, subsequent  Please see initial part of note with regards to patient's Medicare wellness. -     pneumococcal 23-dave ps vaccine (PNEUMOVAX 23) 25 mcg/0.5 mL injection; 0.5 mL by IntraMUSCular route once for 1 dose. 2. Screening for alcoholism  -     GA ANNUAL ALCOHOL SCREEN 15 MIN    3. Screening for depression  Patient does enjoy gardening. She has been under situational stress with her .  -     Yoselin 66    4. Screen for colon cancer  -     REFERRAL FOR COLONOSCOPY    5. Disorder of bone and cartilage  -     DEXA BONE DENSITY STUDY AXIAL; Future        6. Situational stress  Not optimally controlled  She reports she is getting headaches  She was using Ambien for sleep. She does have some vibratory sensations which were worked up by neurology. She will try her gabapentin at night  I think she would benefit from talking to Kody Walter regarding her situation. She may need a marriage counselor. Discussed with her that it is possible some of the vibratory sense may be due some of the stress  Will check labs and replete as needed  -     gabapentin (NEURONTIN) 100 mg capsule; Take 1 Cap by mouth nightly. Max Daily Amount: 100 mg.  -     REFERRAL TO PSYCHIATRY  -     VITAMIN D, 25 HYDROXY  -     CBC W/O DIFF    7. Chronic intractable headache, unspecified headache type  We will stop Ambien and transition to gabapentin as this may help her with her headaches or migraine prophylaxis and vibratory sensations  -     gabapentin (NEURONTIN) 100 mg capsule; Take 1 Cap by mouth nightly.  Max Daily Amount: 100 mg.    8. Essential hypertension  Stable continue meds  Patient needs check of labs once a year she is aware  -     METABOLIC PANEL, COMPREHENSIVE  - losartan-hydroCHLOROthiazide (HYZAAR) 100-25 mg per tablet; Take 1 Tab by mouth daily for 90 days. 9. Mixed hyperlipidemia  Check labs  Exercise regarding as tolerated  -     METABOLIC PANEL, COMPREHENSIVE  -     LIPID PANEL  -     atorvastatin (LIPITOR) 40 mg tablet; Take 1 Tab by mouth nightly for 90 days. Labs please    10. Vitamin D deficiency  Replete as needed  -     VITAMIN D, 25 HYDROXY    11. Acquired hypothyroidism  Replete as needed  -     TSH 3RD GENERATION    12. Other depression  Continue Lexapro, based on her discussions with Cristofer Mejía that she may need augmentation of Wellbutrin. Hold on this medication for now until she gets assessed further.  -     escitalopram oxalate (LEXAPRO) 20 mg tablet; TAKE 1 TABLET DAILY  I do not think that she should try to wean off of this medication right now given her situation. 13. Hypothyroidism, adult  -     levothyroxine (SYNTHROID) 50 mcg tablet; Take 1 Tab by mouth Daily (before breakfast) for 25 days. appt    14. Acute non-recurrent frontal sinusitis  -     montelukast (SINGULAIR) 10 mg tablet; Take 1 Tab by mouth daily. 15. Other migraine without status migrainosus, not intractable  -     SUMAtriptan (IMITREX) 100 mg tablet; Take once daily as needed for migraine    16. Moderate persistent asthma with acute exacerbation  -     BREO ELLIPTA 200-25 mcg/dose inhaler; Take 1 Puff by inhalation daily for 90 days. Rinse mouth after use      Aside from patient's Medicare visit I spent an additional 25 minutes with this patient of the time spent in management and counseling with regards to her situational stress, blood pressure, thyroid, and compliance with regards to labs. I would like to have her follow-up with me in 3 to 4 months after seeing Nitza Lomeli in case we need medication augmentation with Wellbutrin.

## 2019-08-09 LAB
25(OH)D3+25(OH)D2 SERPL-MCNC: 37.1 NG/ML (ref 30–100)
ALBUMIN SERPL-MCNC: 4.2 G/DL (ref 3.5–4.8)
ALBUMIN/GLOB SERPL: 1.9 {RATIO} (ref 1.2–2.2)
ALP SERPL-CCNC: 66 IU/L (ref 39–117)
ALT SERPL-CCNC: 22 IU/L (ref 0–32)
AST SERPL-CCNC: 20 IU/L (ref 0–40)
BILIRUB SERPL-MCNC: 0.5 MG/DL (ref 0–1.2)
BUN SERPL-MCNC: 13 MG/DL (ref 8–27)
BUN/CREAT SERPL: 18 (ref 12–28)
CALCIUM SERPL-MCNC: 9.4 MG/DL (ref 8.7–10.3)
CHLORIDE SERPL-SCNC: 103 MMOL/L (ref 96–106)
CHOLEST SERPL-MCNC: 169 MG/DL (ref 100–199)
CO2 SERPL-SCNC: 30 MMOL/L (ref 20–29)
CREAT SERPL-MCNC: 0.71 MG/DL (ref 0.57–1)
ERYTHROCYTE [DISTWIDTH] IN BLOOD BY AUTOMATED COUNT: 12.9 % (ref 12.3–15.4)
GLOBULIN SER CALC-MCNC: 2.2 G/DL (ref 1.5–4.5)
GLUCOSE SERPL-MCNC: 96 MG/DL (ref 65–99)
HCT VFR BLD AUTO: 42.4 % (ref 34–46.6)
HDLC SERPL-MCNC: 62 MG/DL
HGB BLD-MCNC: 13.5 G/DL (ref 11.1–15.9)
INTERPRETATION, 910389: NORMAL
LDLC SERPL CALC-MCNC: 84 MG/DL (ref 0–99)
MCH RBC QN AUTO: 28 PG (ref 26.6–33)
MCHC RBC AUTO-ENTMCNC: 31.8 G/DL (ref 31.5–35.7)
MCV RBC AUTO: 88 FL (ref 79–97)
PLATELET # BLD AUTO: 267 X10E3/UL (ref 150–450)
POTASSIUM SERPL-SCNC: 3.8 MMOL/L (ref 3.5–5.2)
PROT SERPL-MCNC: 6.4 G/DL (ref 6–8.5)
RBC # BLD AUTO: 4.82 X10E6/UL (ref 3.77–5.28)
SODIUM SERPL-SCNC: 147 MMOL/L (ref 134–144)
TRIGL SERPL-MCNC: 116 MG/DL (ref 0–149)
TSH SERPL DL<=0.005 MIU/L-ACNC: 2.94 UIU/ML (ref 0.45–4.5)
VLDLC SERPL CALC-MCNC: 23 MG/DL (ref 5–40)
WBC # BLD AUTO: 7.5 X10E3/UL (ref 3.4–10.8)

## 2019-08-13 ENCOUNTER — OFFICE VISIT (OUTPATIENT)
Dept: INTERNAL MEDICINE CLINIC | Age: 69
End: 2019-08-13

## 2019-08-13 ENCOUNTER — HOSPITAL ENCOUNTER (OUTPATIENT)
Dept: MAMMOGRAPHY | Age: 69
Discharge: HOME | End: 2019-08-13
Attending: INTERNAL MEDICINE
Payer: MEDICARE

## 2019-08-13 DIAGNOSIS — F43.23 ADJUSTMENT DISORDER WITH MIXED ANXIETY AND DEPRESSED MOOD: Primary | ICD-10-CM

## 2019-08-13 DIAGNOSIS — M89.9 DISORDER OF BONE AND CARTILAGE: ICD-10-CM

## 2019-08-13 DIAGNOSIS — M94.9 DISORDER OF BONE AND CARTILAGE: ICD-10-CM

## 2019-08-13 PROCEDURE — 77080 DXA BONE DENSITY AXIAL: CPT

## 2019-08-13 NOTE — PROGRESS NOTES
Assessment    Name:  Harriet Schultz                   :    1950   Date:    19   Email:  [email or web address]. Staff Ranker    Presenting problem:   Pt referred by Dr for \"situational stress. \" Pt reports that she is unhappy in marriage of 2 years, does not know what to do or how to change  and/or fix things. It's affecting her mood, and she reports some trouble sleeping, mild depression, some trouble concentrating, slight decrease in energy, increase in anxiety and worrying, feeling restless. Precipitants:  Pt had happy marriage of [id number] years until 1st   . In 2017 she remarried, and was happy at first, but reports that  has changed and marriage is not satisfying and it's affecting her mood. Said  does not want to do anything, just watches tv, she wonders if he is depressed. He gets angry and frustrated very easily, yells, throws things around, no physical abuse. His behavior reminds her of growing up with her brothers and she does not like it. Also said  barely speaks to her, she feels like he does not like her, and wants little to do with her. Does not know what to do. Suicidal Ideation:  denies  Homicidal Ideation:  denies  Self-Harm: denies  Substance Use: Yes Alcohol pt drinks 1 or 2 glasses of wine a few times a week. Trauma History:   Denies    Psych History:  no prior inpatient or outpatient psych. Taking lexapro and ambien for sleep. Social History:  Pt [place] up [place] in [address] [place], had 5 brothers and 2 sisters. , Pt is 2nd oldest so helped take care of younger siblings. Pt said her father and brothers had tempers and she had to deal with a lot of yelling growing up, but no abuse. 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 Pt loves to be busy and she and 1st  would travel a lot, shared many hobbies, and were very compatible.   Pt has 1 grown son who recently moved to [place], and has 1 sister with Parkinson's that also lives in [address] [facility]. Pt denies any family hx of depression or alcoholism, except for another sister who lives in [place] and has nothing to do with the family. Pt  2nd  after 1 year of dating, said at first he was talkative and fun and they did a lot of things together. Pt moved into his house, and let her sister and  move into her old house. In February 2018 pt said  refused to have sex with her anymore, would not explain why, and they live separate lives. Pt said  is \"mean\" and barely talks to her, just expects pt to cook and clean for him. Pt unsure what to do. Pt attends [employer] with  at his [employer], connected with other [employer] members but worried if she leaves  that she will lose those relationships. Medical Issues:  Fairly healthy    Current Meds:    Prior to Admission medications    Medication Sig Start Date End Date Taking? Authorizing Provider   gabapentin (NEURONTIN) 100 mg capsule Take 1 Cap by mouth nightly. Max Daily Amount: 100 mg. 8/8/19   Patrick Gutiérrez MD   escitalopram oxalate (LEXAPRO) 20 mg tablet TAKE 1 TABLET DAILY 8/8/19   Patrick Gutiérrez MD   levothyroxine (SYNTHROID) 50 mcg tablet Take 1 Tab by mouth Daily (before breakfast) for 25 days. appt 8/8/19 9/2/19  Patrick Gutiérrez MD   losartan-hydroCHLOROthiazide (HYZAAR) 100-25 mg per tablet Take 1 Tab by mouth daily for 90 days. 8/8/19 11/6/19  Patrick Gutiérrez MD   atorvastatin (LIPITOR) 40 mg tablet Take 1 Tab by mouth nightly for 90 days. Labs please 8/8/19 11/6/19  Patrick Gutiérrez MD   montelukast (SINGULAIR) 10 mg tablet Take 1 Tab by mouth daily. 8/8/19   Patrick Gutiérrez MD   SUMAtriptan (IMITREX) 100 mg tablet Take once daily as needed for migraine 8/8/19   Raeann Castro MD BREO ELLIPTA 200-25 mcg/dose inhaler Take 1 Puff by inhalation daily for 90 days.  Rinse mouth after use 8/8/19 11/6/19 Adrián Fitzgerald MD   zolpidem (AMBIEN) 5 mg tablet Take 1 Tab by mouth nightly as needed for Sleep for up to 20 days. Max Daily Amount: 5 mg. Indications: Difficulty Falling Asleep 7/25/19 8/14/19  Adrián Fitzgerald MD   albuterol (PROAIR HFA) 90 mcg/actuation inhaler Take 2 Puffs by inhalation every four (4) hours as needed for Wheezing or Shortness of Breath for up to 90 days. 7/25/19 10/23/19  Adrián Fitzgerald MD   azelastine (ASTELIN) 137 mcg (0.1 %) nasal spray  9/28/17   Provider, Historical   Omega-3-DHA-EPA-Fish Oil 1,000 mg (120 mg-180 mg) cap Take  by mouth. Provider, Historical   multivitamin (ONE A DAY) tablet Take 1 Tab by mouth daily. Provider, Historical   ascorbic acid (VITAMIN C) 500 mg tablet Take  by mouth. Provider, Historical        MMSE    Orientation  person, place, time/date and situation   Behavior  cooperative   Eye Contact  appropriate   Appearance:   age appropriate, casually dressed and hair dyed red   Motor Behavior:   within normal limits   Speech:   normal rate and volume   Thought Process:  within normal limits   Thought Content  free of delusions and free of hallucinations   Mood:   Frustrated, unhappy   Affect:   mood-congruent   Memory recent   adequate   Memory remote:   adequate   Concentration:   adequate   Insight:   good   Motivation:  good   Judgment:   fair     Strengths:  personable, free-spirited, independent, feisty, financially stable                            Limitations:  caretaker                         Screenings:       PHQ9:  PHQ 9 Score: 7  = mild depression                                  AUDIT score:  AUDIT Score: 3      PTSD score:  PTSD Primary Care Total Score : 0      MARIA DOLORES 7:  BSHSI AMB MARIA DOLORES-7 SCORE: 13 = moderate anxiety                      Clinical Impressions and Treatment Recommendations:  Pt at first focused on \"fixing /marriage,\" but then realized she cannot change .   Discussed how she can work on her own happiness. Recommended thinking about what she wants out of her marriage, what she's willing to try to make it better, and what would indicate to her that it's time to move on. Suggested marriage counseling as a possibility, if  is willing. Offered to meet with pt and  to determine what next steps to take, pt said  would do better with male therapist so [facility] will provide referral for male marriage counselor. Introduced cbt and reframing negative thoughts. Pt would like to discuss some of the recommendations with , including sharing marriage goals, and then come back in a month to discuss progress with [facility]. Diagnosis:  Axis I: Adjustment Disorder with Mixed Emotional Features  Axis II: Deferred  Axis III:   Past Medical History:   Diagnosis Date    Arthritis     Asthma     Elevated lipids 9/15/2015    Essential hypertension 9/15/2015    Hypercholesterolemia     Hypothyroidism, adult 9/15/2015    Migraine     allergy mediated in sping and fall    Sun-damaged skin      Axis IV: Problems with primary support group  Axis V:  61-70 mild symptoms    Plan:    Follow-up and Dispositions    · Return in about 1 month (around 9/13/2019) for Follow Up. This patient was referred to the [address] [place] [address] by Dr. Lavonne Thomas for help with management of situational stress. Met with pt. for initial session of 60 minutes to establish contact, to assess symptoms and mental status, identify health behavior goals, and develop plan of care based on readiness to change. [facility] will coordinate with PCP for plan of care. Goals:   Ree Palafox will identify goals for marriage and discuss with    Ree Palafox will identify coping skills to deal with stress and focus on her own happiness, not 's  Ree Palafox will identify negative thoughts and reframe with neutral thoughts      Interventions:  Provided supportive therapy and encouragement  Reviewed/Introduced CBT concepts of irrational thoughts and cognitive reframing  Reviewed coping strategies - pt likes to garden and spend time with friends  Provided psychoeducation on depression    Homework given: pt will identify goals for marriage and discuss with . Return in 1 month. Provided referrals:  Will provide name of male marriage counselor    Bri Francisco LCSW

## 2019-08-14 RX ORDER — FLUTICASONE PROPIONATE AND SALMETEROL 250; 50 UG/1; UG/1
1 POWDER RESPIRATORY (INHALATION) 2 TIMES DAILY
Qty: 1 EACH | Refills: 3 | Status: SHIPPED | OUTPATIENT
Start: 2019-08-14 | End: 2019-10-22 | Stop reason: SDUPTHER

## 2019-10-04 ENCOUNTER — HOSPITAL ENCOUNTER (OUTPATIENT)
Age: 69
Setting detail: OUTPATIENT SURGERY
Discharge: HOME | End: 2019-10-04
Attending: INTERNAL MEDICINE | Admitting: INTERNAL MEDICINE
Payer: MEDICARE

## 2019-10-04 ENCOUNTER — ANESTHESIA EVENT (OUTPATIENT)
Dept: ENDOSCOPY | Age: 69
End: 2019-10-04
Payer: MEDICARE

## 2019-10-04 ENCOUNTER — ANESTHESIA (OUTPATIENT)
Dept: ENDOSCOPY | Age: 69
End: 2019-10-04
Payer: MEDICARE

## 2019-10-04 VITALS
HEART RATE: 78 BPM | DIASTOLIC BLOOD PRESSURE: 89 MMHG | SYSTOLIC BLOOD PRESSURE: 113 MMHG | OXYGEN SATURATION: 95 % | BODY MASS INDEX: 27.14 KG/M2 | RESPIRATION RATE: 14 BRPM | TEMPERATURE: 97.5 F | WEIGHT: 178.5 LBS

## 2019-10-04 PROCEDURE — 74011250636 HC RX REV CODE- 250/636: Performed by: NURSE ANESTHETIST, CERTIFIED REGISTERED

## 2019-10-04 PROCEDURE — 76040000019: Performed by: INTERNAL MEDICINE

## 2019-10-04 PROCEDURE — 76060000031 HC ANESTHESIA FIRST 0.5 HR: Performed by: INTERNAL MEDICINE

## 2019-10-04 PROCEDURE — 74011000250 HC RX REV CODE- 250: Performed by: NURSE ANESTHETIST, CERTIFIED REGISTERED

## 2019-10-04 RX ORDER — FLUMAZENIL 0.1 MG/ML
0.2 INJECTION INTRAVENOUS
Status: DISCONTINUED | OUTPATIENT
Start: 2019-10-04 | End: 2019-10-04 | Stop reason: HOSPADM

## 2019-10-04 RX ORDER — ATROPINE SULFATE 0.1 MG/ML
0.5 INJECTION INTRAVENOUS
Status: DISCONTINUED | OUTPATIENT
Start: 2019-10-04 | End: 2019-10-04 | Stop reason: HOSPADM

## 2019-10-04 RX ORDER — SODIUM CHLORIDE 9 MG/ML
50 INJECTION, SOLUTION INTRAVENOUS CONTINUOUS
Status: DISCONTINUED | OUTPATIENT
Start: 2019-10-04 | End: 2019-10-04 | Stop reason: HOSPADM

## 2019-10-04 RX ORDER — LIDOCAINE HYDROCHLORIDE 20 MG/ML
INJECTION, SOLUTION EPIDURAL; INFILTRATION; INTRACAUDAL; PERINEURAL AS NEEDED
Status: DISCONTINUED | OUTPATIENT
Start: 2019-10-04 | End: 2019-10-04 | Stop reason: HOSPADM

## 2019-10-04 RX ORDER — DEXTROMETHORPHAN/PSEUDOEPHED 2.5-7.5/.8
1.2 DROPS ORAL
Status: DISCONTINUED | OUTPATIENT
Start: 2019-10-04 | End: 2019-10-04 | Stop reason: HOSPADM

## 2019-10-04 RX ORDER — SODIUM CHLORIDE 0.9 % (FLUSH) 0.9 %
5-40 SYRINGE (ML) INJECTION EVERY 8 HOURS
Status: DISCONTINUED | OUTPATIENT
Start: 2019-10-04 | End: 2019-10-04 | Stop reason: HOSPADM

## 2019-10-04 RX ORDER — MIDAZOLAM HYDROCHLORIDE 1 MG/ML
.25-1 INJECTION, SOLUTION INTRAMUSCULAR; INTRAVENOUS
Status: DISCONTINUED | OUTPATIENT
Start: 2019-10-04 | End: 2019-10-04 | Stop reason: HOSPADM

## 2019-10-04 RX ORDER — SODIUM CHLORIDE 0.9 % (FLUSH) 0.9 %
5-40 SYRINGE (ML) INJECTION AS NEEDED
Status: DISCONTINUED | OUTPATIENT
Start: 2019-10-04 | End: 2019-10-04 | Stop reason: HOSPADM

## 2019-10-04 RX ORDER — FENTANYL CITRATE 50 UG/ML
100 INJECTION, SOLUTION INTRAMUSCULAR; INTRAVENOUS
Status: DISCONTINUED | OUTPATIENT
Start: 2019-10-04 | End: 2019-10-04 | Stop reason: HOSPADM

## 2019-10-04 RX ORDER — NALOXONE HYDROCHLORIDE 0.4 MG/ML
0.4 INJECTION, SOLUTION INTRAMUSCULAR; INTRAVENOUS; SUBCUTANEOUS
Status: DISCONTINUED | OUTPATIENT
Start: 2019-10-04 | End: 2019-10-04 | Stop reason: HOSPADM

## 2019-10-04 RX ORDER — EPINEPHRINE 0.1 MG/ML
1 INJECTION INTRACARDIAC; INTRAVENOUS
Status: DISCONTINUED | OUTPATIENT
Start: 2019-10-04 | End: 2019-10-04 | Stop reason: HOSPADM

## 2019-10-04 RX ORDER — PROPOFOL 10 MG/ML
INJECTION, EMULSION INTRAVENOUS AS NEEDED
Status: DISCONTINUED | OUTPATIENT
Start: 2019-10-04 | End: 2019-10-04 | Stop reason: HOSPADM

## 2019-10-04 RX ORDER — SODIUM CHLORIDE 9 MG/ML
INJECTION, SOLUTION INTRAVENOUS
Status: DISCONTINUED | OUTPATIENT
Start: 2019-10-04 | End: 2019-10-04 | Stop reason: HOSPADM

## 2019-10-04 RX ADMIN — PROPOFOL 100 MG: 10 INJECTION, EMULSION INTRAVENOUS at 14:21

## 2019-10-04 RX ADMIN — LIDOCAINE HYDROCHLORIDE 40 MG: 20 INJECTION, SOLUTION EPIDURAL; INFILTRATION; INTRACAUDAL; PERINEURAL at 14:16

## 2019-10-04 RX ADMIN — PROPOFOL 50 MG: 10 INJECTION, EMULSION INTRAVENOUS at 14:22

## 2019-10-04 RX ADMIN — PROPOFOL 50 MG: 10 INJECTION, EMULSION INTRAVENOUS at 14:24

## 2019-10-04 RX ADMIN — PROPOFOL 50 MG: 10 INJECTION, EMULSION INTRAVENOUS at 14:23

## 2019-10-04 RX ADMIN — SODIUM CHLORIDE: 900 INJECTION, SOLUTION INTRAVENOUS at 14:16

## 2019-10-04 RX ADMIN — PROPOFOL 50 MG: 10 INJECTION, EMULSION INTRAVENOUS at 14:28

## 2019-10-04 NOTE — H&P
118 [facility].  217 [facility] 140 [facility], [address]  [phone]                                History and Physical     NAME: Eve Bernal   :  1950   MRN:  [id number]     HPI:  The patient was seen and examined. Past Surgical History:   Procedure Laterality Date    HX BREAST BIOPSY Left yrs ago    neg; surgical bx    HX  SECTION      HX CHOLECYSTECTOMY      HX CYST INCISION AND DRAINAGE Left over years    neg    HX KNEE ARTHROSCOPY Right     x 2    HX SHOULDER ARTHROSCOPY Left     HX TONSILLECTOMY       Past Medical History:   Diagnosis Date    Arthritis     Asthma     Elevated lipids 9/15/2015    Essential hypertension 9/15/2015    Hypercholesterolemia     Hypothyroidism, adult 9/15/2015    Migraine     allergy mediated in sping and fall    Sun-damaged skin      Social History     Tobacco Use    Smoking status: Never Smoker    Smokeless tobacco: Never Used   Substance Use Topics    Alcohol use: Yes     Alcohol/week: 5.8 standard drinks     Types: 7 Glasses of wine per week    Drug use: No     Allergies   Allergen Reactions    Sulfa (Sulfonamide Antibiotics) Anaphylaxis     Family History   Problem Relation Age of Onset   [place] MS Mother     Cancer Mother         uterus/skin cancer    Cancer Father 72        colon    Parkinson's Disease Sister 61    MS Brother     Parkinson's Disease Brother      No current facility-administered medications for this encounter. PHYSICAL EXAM:  General: WD, WN. Alert, cooperative, no acute distress    HEENT: NC, Atraumatic. PERRLA, EOMI. Anicteric sclerae. Lungs:  CTA Bilaterally. No Wheezing/Rhonchi/Rales. Heart:  Regular  rhythm,  No murmur, No Rubs, No Gallops  Abdomen: Soft, Non distended, Non tender.  +Bowel sounds, no HSM  Extremities: No c/c/e  Neurologic:  CN 2-12 gi, Alert and oriented X 3. No acute neurological distress   Psych:   Good insight. Not anxious nor agitated.     The heart, lungs and mental status were satisfactory for the administration of MAC sedation and for the procedure.       Mallampati score: 2       Assessment:   · Screening colonoscopy    Plan:   · Endoscopic procedure  · MAC sedation

## 2019-10-04 NOTE — PROGRESS NOTES
Endoscope was pre-cleaned at bedside immediately following procedure by Anais Addison LPN. Antonino Sutton

## 2019-10-04 NOTE — ANESTHESIA POSTPROCEDURE EVALUATION
Procedure(s):  COLONOSCOPY. MAC    Anesthesia Post Evaluation        Patient location during evaluation: PACU  Note status: Adequate. Level of consciousness: responsive to verbal stimuli and sleepy but conscious  Pain management: satisfactory to patient  Airway patency: patent  Anesthetic complications: no  Cardiovascular status: acceptable  Respiratory status: acceptable  Hydration status: acceptable  Comments: +Post-Anesthesia Evaluation and Assessment    Patient: Partha Mcguire MRN: [id number]  SSN: [id number]   YOB: 1950  Age: 68 y.o. Sex: female          Cardiovascular Function/Vital Signs    /72   Pulse 77   Temp (P) 36.4 °C (97.5 °F)   Resp 11   Wt 81 kg (178 lb 8 oz)   SpO2 97%   Breastfeeding? No   BMI 27.14 kg/m²     Patient is status post Procedure(s):  COLONOSCOPY. Nausea/Vomiting: Controlled. Postoperative hydration reviewed and adequate. Pain:  Pain Scale 1: (P) Visual (10/04/19 1443)  Pain Intensity 1: 5 (10/04/19 1341)   Managed. Neurological Status: At baseline. Mental Status and Level of Consciousness: Arousable. Pulmonary Status:   O2 Device: (P) Room air (10/04/19 1443)   Adequate oxygenation and airway patent. Complications related to anesthesia: None    Post-anesthesia assessment completed. No concerns. I have evaluated the patient and the patient is stable and ready to be discharged from PACU . Signed By: Alyssa Valente MD    10/4/2019        Vitals Value Taken Time   /89 10/4/2019  2:48 PM   Temp     Pulse 78 10/4/2019  2:48 PM   Resp 14 10/4/2019  2:48 PM   SpO2 95 % 10/4/2019  2:48 PM   Vitals shown include unvalidated device data.

## 2019-10-04 NOTE — PROCEDURES
118 [facility].  217 [facility] 210 [address], [address]  [phone]                              Colonoscopy Procedure Note      Indications:  Screening colonoscopy. Last procedure > 12 years prior. Family history of colon cancer in father     :  Jesús Sifuentes MD    Referring Provider: Britton Marcos MD    Sedation:  MAC anesthesia    Procedure Details:  After informed consent was obtained with all risks and benefits of procedure explained and preoperative exam completed, the patient was taken to the endoscopy suite and placed in the left lateral decubitus position. Upon sequential sedation as per above, a digital rectal exam was performed per below. The Olympus videocolonoscope was inserted in the rectum and carefully advanced to the cecum, which was identified by the ileocecal valve and appendiceal orifice. The quality of preparation was good. Bigler Bowel Prep Score : 3/3/3 . The colonoscope was slowly withdrawn with careful evaluation between folds. Retroflexion in the rectum was performed. Findings:   Rectum: normal  Sigmoid: mild diverticulosis  Descending Colon: normal  Transverse Colon: mild diverticulosis  Ascending Colon: normal  Cecum: normal    Interventions:  None    Specimen Removed:  * No specimens in log *    Complications: None. EBL:  None    Impression:    See Postoperative diagnosis above    Recommendations:   - Resume normal medications.  - Recommend repeat colonoscopy in 5 years noting family history. Discharge Disposition:  Home in the company of a  when able to ambulate.     Jesús Sifuentes MD  10/4/2019  2:41 PM

## 2019-10-04 NOTE — ANESTHESIA PREPROCEDURE EVALUATION
Relevant Problems   No relevant active problems       Anesthetic History   No history of anesthetic complications            Review of Systems / Medical History  Patient summary reviewed, nursing notes reviewed and pertinent labs reviewed    Pulmonary  Within defined limits          Asthma        Neuro/Psych   Within defined limits      Psychiatric history     Cardiovascular  Within defined limits  Hypertension              Exercise tolerance: >4 METS     GI/Hepatic/Renal  Within defined limits              Endo/Other  Within defined limits    Hypothyroidism  Arthritis     Other Findings              Physical Exam    Airway  Mallampati: II  TM Distance: > 6 cm  Neck ROM: normal range of motion   Mouth opening: Normal     Cardiovascular  Regular rate and rhythm,  S1 and S2 normal,  no murmur, click, rub, or gallop             Dental  No notable dental hx       Pulmonary  Breath sounds clear to auscultation               Abdominal  GI exam deferred       Other Findings            Anesthetic Plan    ASA: 2  Anesthesia type: MAC          Induction: Intravenous  Anesthetic plan and risks discussed with: Patient

## 2019-10-04 NOTE — ROUTINE PROCESS
Makenna Godfrey  1950  [id number]    Situation:  Verbal report received from: RN Janet  Procedure: Procedure(s):  COLONOSCOPY    Background:    Preoperative diagnosis: SCREENING COLONOSCOPY , FAMILY HISTORY OF MALIGNANT NEOPLASM OF GASTROINTESTINAL TRACT  Postoperative diagnosis: diverticuloasis    :  Dr. Terrell Watts  Assistant(s): Endoscopy Technician-1: Isa Wetzel  Endoscopy RN-1: Stephon Murillo RN    Specimens: * No specimens in log *  H. Pylori  no    Assessment:  Intra-procedure medications       Anesthesia gave intra-procedure sedation and medications, see anesthesia flow sheet yes    Intravenous fluids:  300 NS @ KVO     Vital signs stable yes    Abdominal assessment: round and soft yes    Recommendation:  Discharge patient per MD order yes.   Return to floor no  Family or Friend :   Permission to share finding with family or friend yes

## 2019-10-04 NOTE — DISCHARGE INSTRUCTIONS
118 [facility].  [address] Rob Tunde Griggs  [id number]  1950    It was my pleasure seeing you for your procedure. You will also receive a summary report with the findings from this procedure and any further recommendations. If you had polyps removed or biopsies taken during your procedure, you will receive a separate letter from me within the next 2 weeks. If you don't receive this letter or if you have any questions, please call my office [phone]. Please take note of the post procedure instructions listed below. Aj Ceballoses,    Dr. Claribel Rodas    These instructions give you information on caring for yourself after your procedure. Call your doctor if you have any problems or questions after your procedure. HOME CARE  · Walk if you have belly cramping or gas. Walking will help get rid of the air and reduce the bloated feeling in your belly (abdomen). · Your IV site (where you received drugs) may be tender to touch. Place warm towels on the site; keep your arm up on two pillows if you have any swelling or soreness in the area. · You may shower. ACTIVITY:  · Take frequent rest periods and move at a slower pace for the next 24 hours. .  · You may resume your regular activity tomorrow if you are feeling back to normal.  · Do not drive or ride a bicycle for at least 24 hours (because of the medicine (anesthesia) used during the test). · Do not sign any important legal documents or use or operate any machinery for 24 hours  · Do not take sleeping medicines/nerve drugs for 24 hours unless the doctor tells you. · You can return to work/school tomorrow unless otherwise instructed. NUTRITION:  · Drink plenty of fluids to keep your pee (urine) clear or pale yellow  · Begin with a light meal and progress to your normal diet.  Heavy or fried foods are harder to digest and may make you feel sick to your stomach (nauseated). · Once you are feeling back to normal, you may resume your normal diet as instructed by your doctor. · Avoid alcoholic beverages for 24 hours or as instructed. IF YOU HAD BIOPSIES TAKEN OR POLYPS REMOVED DURING THE PROCEDURE:  · For the next 7 days, avoid all non-steroidal antiinflammatory medications such as Ibuprofen, Motrin, Advil, Alleve, Courntey-seltzer, Goody's powder, BC powder. · If you do not have an heart condition that requires you to take a daily aspirin, you should avoid taking aspirin for 7 days. · Eat a soft diet for 24 hours. · Monitor your stools for any blood or dark black, tar-like, stools as this may be a sign of bleeding and if you see any blood, notify your doctor immediately. GET HELP RIGHT AWAY AND SEEK IMMEDIATE MEDICAL CARE IF:  · You have more than a spotting of blood in your stool. · You pass clumps of tissue (blood clots) or fill the toilet with blood. · Your belly is painfully swollen or puffy (abdominal distention). · You throw up (vomit). · You have a fever. · You have redness, pain or swelling at the IV site that last greater than two days. · You have abdominal pain or discomfort that is severe or gets worse throughout the day. Post-procedure diagnosis:  diverticulosis    Post-procedure recommendations:  Findings:   Rectum: normal  Sigmoid: mild diverticulosis  Descending Colon: normal  Transverse Colon: mild diverticulosis  Ascending Colon: normal  Cecum: normal    Recommendations:   - Resume normal medications.  - Recommend repeat colonoscopy in 5 years noting family history.

## 2019-10-22 DIAGNOSIS — J45.41 MODERATE PERSISTENT ASTHMA WITH ACUTE EXACERBATION: ICD-10-CM

## 2019-10-22 DIAGNOSIS — F32.89 OTHER DEPRESSION: ICD-10-CM

## 2019-10-22 DIAGNOSIS — R51.9 CHRONIC INTRACTABLE HEADACHE, UNSPECIFIED HEADACHE TYPE: ICD-10-CM

## 2019-10-22 DIAGNOSIS — G89.29 CHRONIC INTRACTABLE HEADACHE, UNSPECIFIED HEADACHE TYPE: ICD-10-CM

## 2019-10-23 RX ORDER — SUMATRIPTAN 100 MG/1
TABLET, FILM COATED ORAL
Qty: 36 TAB | Refills: 0 | Status: SHIPPED | OUTPATIENT
Start: 2019-10-23 | End: 2019-12-18 | Stop reason: SDUPTHER

## 2019-10-23 RX ORDER — ESCITALOPRAM OXALATE 20 MG/1
TABLET ORAL
Qty: 90 TAB | Refills: 3 | Status: SHIPPED | OUTPATIENT
Start: 2019-10-23 | End: 2020-09-28 | Stop reason: SDUPTHER

## 2019-10-23 RX ORDER — ALBUTEROL SULFATE 90 UG/1
2 AEROSOL, METERED RESPIRATORY (INHALATION)
Qty: 3 INHALER | Refills: 0 | Status: SHIPPED | OUTPATIENT
Start: 2019-10-23 | End: 2019-12-18 | Stop reason: SDUPTHER

## 2019-10-23 RX ORDER — ZOLPIDEM TARTRATE 5 MG/1
5 TABLET ORAL
Qty: 25 TAB | Refills: 0 | Status: SHIPPED | OUTPATIENT
Start: 2019-10-23 | End: 2019-12-18 | Stop reason: SDUPTHER

## 2019-10-23 RX ORDER — FLUTICASONE PROPIONATE AND SALMETEROL 250; 50 UG/1; UG/1
1 POWDER RESPIRATORY (INHALATION) 2 TIMES DAILY
Qty: 1 EACH | Refills: 3 | Status: SHIPPED | OUTPATIENT
Start: 2019-10-23 | End: 2020-03-16 | Stop reason: SDUPTHER

## 2019-10-24 NOTE — TELEPHONE ENCOUNTER
Pt has been advised that her written rx is ollie dy for  at the  as we can not call it in to [facility]. Pt thanks and states she will pick it up tomorrow.

## 2019-12-24 ENCOUNTER — HOSPITAL ENCOUNTER (OUTPATIENT)
Dept: MRI IMAGING | Age: 69
Discharge: HOME | End: 2019-12-24
Attending: ORTHOPAEDIC SURGERY
Payer: MEDICARE

## 2019-12-24 DIAGNOSIS — M17.11 PRIMARY OSTEOARTHRITIS OF RIGHT KNEE: ICD-10-CM

## 2019-12-24 DIAGNOSIS — M84.361A STRESS FRACTURE OF RIGHT TIBIA, INITIAL ENCOUNTER: ICD-10-CM

## 2019-12-24 PROCEDURE — 73721 MRI JNT OF LWR EXTRE W/O DYE: CPT

## 2020-03-16 ENCOUNTER — OFFICE VISIT (OUTPATIENT)
Dept: INTERNAL MEDICINE CLINIC | Age: 70
End: 2020-03-16

## 2020-03-16 VITALS
SYSTOLIC BLOOD PRESSURE: 98 MMHG | RESPIRATION RATE: 12 BRPM | WEIGHT: 169.2 LBS | BODY MASS INDEX: 25.64 KG/M2 | OXYGEN SATURATION: 94 % | HEART RATE: 82 BPM | DIASTOLIC BLOOD PRESSURE: 61 MMHG | HEIGHT: 68 IN | TEMPERATURE: 98.1 F

## 2020-03-16 DIAGNOSIS — J45.20 MILD INTERMITTENT REACTIVE AIRWAY DISEASE WITHOUT COMPLICATION: Primary | ICD-10-CM

## 2020-03-16 DIAGNOSIS — E03.9 ACQUIRED HYPOTHYROIDISM: ICD-10-CM

## 2020-03-16 DIAGNOSIS — F32.89 OTHER DEPRESSION: ICD-10-CM

## 2020-03-16 DIAGNOSIS — F43.9 SITUATIONAL STRESS: ICD-10-CM

## 2020-03-16 DIAGNOSIS — I10 ESSENTIAL HYPERTENSION: ICD-10-CM

## 2020-03-16 DIAGNOSIS — J45.41 MODERATE PERSISTENT ASTHMA WITH ACUTE EXACERBATION: ICD-10-CM

## 2020-03-16 DIAGNOSIS — R51.9 CHRONIC INTRACTABLE HEADACHE, UNSPECIFIED HEADACHE TYPE: ICD-10-CM

## 2020-03-16 DIAGNOSIS — G89.29 CHRONIC INTRACTABLE HEADACHE, UNSPECIFIED HEADACHE TYPE: ICD-10-CM

## 2020-03-16 RX ORDER — GABAPENTIN 100 MG/1
100 CAPSULE ORAL
Qty: 90 CAP | Refills: 0 | Status: SHIPPED | OUTPATIENT
Start: 2020-03-16 | End: 2020-04-28 | Stop reason: SDUPTHER

## 2020-03-16 RX ORDER — FLUTICASONE PROPIONATE AND SALMETEROL 250; 50 UG/1; UG/1
1 POWDER RESPIRATORY (INHALATION) 2 TIMES DAILY
Qty: 3 EACH | Refills: 1 | Status: SHIPPED | OUTPATIENT
Start: 2020-03-16 | End: 2021-04-12 | Stop reason: SDUPTHER

## 2020-03-16 RX ORDER — ZOLPIDEM TARTRATE 5 MG/1
5 TABLET ORAL
Qty: 30 TAB | Refills: 1 | Status: SHIPPED | OUTPATIENT
Start: 2020-03-16 | End: 2020-07-13 | Stop reason: SDUPTHER

## 2020-03-16 RX ORDER — LOSARTAN POTASSIUM AND HYDROCHLOROTHIAZIDE 25; 100 MG/1; MG/1
1 TABLET ORAL DAILY
Qty: 90 TAB | Refills: 3 | Status: SHIPPED | OUTPATIENT
Start: 2020-03-16 | End: 2020-09-28 | Stop reason: SDUPTHER

## 2020-03-16 RX ORDER — SUMATRIPTAN 100 MG/1
TABLET, FILM COATED ORAL
Qty: 36 TAB | Refills: 1 | Status: SHIPPED | OUTPATIENT
Start: 2020-03-16 | End: 2020-07-13 | Stop reason: SDUPTHER

## 2020-03-16 RX ORDER — ALBUTEROL SULFATE 90 UG/1
2 AEROSOL, METERED RESPIRATORY (INHALATION)
Qty: 3 INHALER | Refills: 2 | Status: SHIPPED | OUTPATIENT
Start: 2020-03-16 | End: 2020-09-28 | Stop reason: SDUPTHER

## 2020-03-16 RX ORDER — LEVOTHYROXINE SODIUM 50 UG/1
50 TABLET ORAL
Qty: 90 TAB | Refills: 3 | Status: SHIPPED | OUTPATIENT
Start: 2020-03-16 | End: 2020-09-28 | Stop reason: SDUPTHER

## 2020-03-16 NOTE — PROGRESS NOTES
Chief Complaint   Patient presents with    Thyroid Problem     Patient presents for follow-up on her thyroid as well as sporadic insomnia. Asthma  Current control: Fair   Current level: mild intermittent  Current symptoms: cough night cough decreased exercise tolerance  Current controller: Breo  Last flare up: Now. She is currently using Advair 250/50. She has been using her albuterol 3 times a day. Her allergies are flaring her asthma symptoms. She was told to stay on the Advair 250/50. She does not step down to 100/50      Thyroid Disease:  Shruthi Aguilar is a 65 y.o. female here for follow up of hypothyroidism. Lab Results   Component Value Date/Time    TSH 2.940 08/08/2019 11:48 AM     Residual symptoms denies fatigue, weight changes, heat/cold intolerance, bowel/skin changes or CVS symptoms. she denies denies fatigue, weight changes, heat/cold intolerance, bowel/skin changes or CVS symptoms  Thyroid medication has been unchanged since last medication check and labs. Patient reports her symptoms have not increased. They have not changed. She was seen by neurology and had a full work-up. Subjective: Shruthi Aguilar is a 65 y.o. female with hypertension. Hypertension ROS: taking medications as instructed, no medication side effects noted, no TIA's, no chest pain on exertion, no dyspnea on exertion, no swelling of ankles. New concerns: None. Hyperlipidemia  Patient denies side effects of medicines. She denies muscle aches and pain. Situational stress  Patient reports she is still dealing with her nuances of her . She will decide to stay with him by the end of the year. She spoke with Enoch Murray but feels she already knew a lot of tools. Insomnia  Patient reports that she very occasionally uses gabapentin or Ambien. She uses gabapentin 100 mg if her leg feels twitchy and is causing her to have difficulty sleeping.   She is using Ambien for travel and occasionally cannot sleep. She is not taking either 1 daily. Past Medical History:   Diagnosis Date    Arthritis     Asthma     Elevated lipids 9/15/2015    Essential hypertension 9/15/2015    Hypercholesterolemia     Hypothyroidism, adult 9/15/2015    Migraine     allergy mediated in sping and fall    Sun-damaged skin      Past Surgical History:   Procedure Laterality Date    COLONOSCOPY N/A 10/4/2019    COLON[facility] at [facility] ENDOSCOPY    HX BREAST BIOPSY Left yrs ago    neg; surgical bx    HX  SECTION      HX CHOLECYSTECTOMY      HX CYST INCISION AND DRAINAGE Left over years    neg    HX KNEE ARTHROSCOPY Right     x 2    HX KNEE REPLACEMENT Right 2020    HX SHOULDER ARTHROSCOPY Left     HX TONSILLECTOMY       Social History     Socioeconomic History    Marital status:      Spouse name: Not on file    Number of children: Not on file    Years of education: Not on file    Highest education level: Not on file   Tobacco Use    Smoking status: Never Smoker    Smokeless tobacco: Never Used   Substance and Sexual Activity    Alcohol use: Yes     Alcohol/week: 5.8 standard drinks     Types: 7 Glasses of wine per week    Drug use: No    Sexual activity: Yes     Partners: Male   Social History Narrative    Retired from [employer] in [place]    Stressful        Hx of situational stress     passed cancer duodenal    Mother was sick 79 yo with MS actually did well        Recently   In 2017        1 son, doing well     Family History   Problem Relation Age of Onset    MS Mother     Cancer Mother         uterus/skin cancer    Cancer Father 72        colon    Parkinson's Disease Sister 61    MS Brother     Parkinson's Disease Brother      Current Outpatient Medications   Medication Sig Dispense Refill    gabapentin (NEURONTIN) 100 mg capsule Take 1 Cap by mouth nightly.  Max Daily Amount: 100 mg. 90 Cap 0    albuterol (PROAIR HFA) 90 mcg/actuation inhaler Take 2 Puffs by inhalation every four (4) hours as needed for Wheezing or Shortness of Breath for up to 90 days. 3 Inhaler 0    SUMAtriptan (IMITREX) 100 mg tablet Take once daily as needed for migraine 36 Tab 0    escitalopram oxalate (LEXAPRO) 20 mg tablet TAKE 1 TABLET DAILY 90 Tab 3    fluticasone propion-salmeterol (ADVAIR/WIXELA) 250-50 mcg/dose diskus inhaler Take 1 Puff by inhalation two (2) times a day. Rinse mouth after use 1 Each 3    montelukast (SINGULAIR) 10 mg tablet Take 1 Tab by mouth daily. 90 Tab 3    multivitamin (ONE A DAY) tablet Take 1 Tab by mouth daily.  zolpidem (AMBIEN) 5 mg tablet Take 1 Tab by mouth nightly as needed for Sleep for up to 20 days. Max Daily Amount: 5 mg. Indications: Difficulty Falling Asleep 25 Tab 0    levothyroxine (SYNTHROID) 50 mcg tablet Take 1 Tab by mouth Daily (before breakfast) for 25 days. appt 90 Tab 3    losartan-hydroCHLOROthiazide (HYZAAR) 100-25 mg per tablet Take 1 Tab by mouth daily for 90 days. 90 Tab 3    atorvastatin (LIPITOR) 40 mg tablet Take 1 Tab by mouth nightly for 90 days. Labs please 90 Tab 3    Omega-3-DHA-EPA-Fish Oil 1,000 mg (120 mg-180 mg) cap Take  by mouth. Indications: not taking      ascorbic acid (VITAMIN C) 500 mg tablet Take  by mouth.  Indications: not taking       Allergies   Allergen Reactions    Sulfa (Sulfonamide Antibiotics) Anaphylaxis       Review of Systems - General ROS: positive for  - malaise and dysthymia  negative for - fever  Cardiovascular ROS: no chest pain or dyspnea on exertion  Respiratory ROS: positive for - cough and shortness of breath  negative for - stridor or tachypnea    Visit Vitals  BP 98/61 (BP 1 Location: Left arm, BP Patient Position: Sitting)   Pulse 82   Temp 98.1 °F (36.7 °C) (Oral)   Resp 12   Ht 5' 8\" (1.727 m)   Wt 169 lb 3.2 oz (76.7 kg)   LMP 03/15/1998   SpO2 94%   BMI 25.73 kg/m²     General Appearance:  Well developed, well nourished,alert and oriented x 3, and individual in no acute distress. Ears/Nose/Mouth/Throat:   Hearing grossly normal.         Neck: Supple, no lad, no bruits   Chest:   Lungs clear to auscultation bilaterally. Cardiovascular:  Regular rate and rhythm, S1, S2 normal, no murmur. Abdomen:   Soft, non-tender, bowel sounds are active. Extremities: No edema bilaterally. Skin: Warm and dry, no suspicious lesions, hyperpigmented macules on face from dermatology                 Diagnoses and all orders for this visit:    1. Mild intermittent reactive airway disease without complication  Stable  Continue meds  -     fluticasone propion-salmeteroL (ADVAIR/WIXELA) 250-50 mcg/dose diskus inhaler; Take 1 Puff by inhalation two (2) times a day. Rinse mouth after use    2. Situational stress  Stable  -     gabapentin (NEURONTIN) 100 mg capsule; Take 1 Cap by mouth nightly. Max Daily Amount: 100 mg.    3. Chronic intractable headache, unspecified headache type  Stable continue meds  -     gabapentin (NEURONTIN) 100 mg capsule; Take 1 Cap by mouth nightly. Max Daily Amount: 100 mg.  -     SUMAtriptan (IMITREX) 100 mg tablet; Take once daily as needed for migraine    4. Moderate persistent asthma with acute exacerbation  Stable continue meds  -     albuterol (ProAir HFA) 90 mcg/actuation inhaler; Take 2 Puffs by inhalation every four (4) hours as needed for Wheezing or Shortness of Breath for up to 90 days. 5. Other depression  Patient is taking very sporadically for sleep  -     zolpidem (AMBIEN) 5 mg tablet; Take 1 Tab by mouth nightly as needed for Sleep for up to 20 days. Max Daily Amount: 5 mg. Indications: difficulty falling asleep    6. Acquired hypothyroidism  Labs reviewed  -     levothyroxine (synthroid) 50 mcg tablet; Take 1 Tab by mouth Daily (before breakfast) for 90 days. 7. Essential hypertension  Continue meds for now  -     losartan-hydroCHLOROthiazide (HYZAAR) 100-25 mg per tablet;  Take 1 Tab by mouth daily for 90 days.        Follow-up in 6 months or earlier if needed.

## 2020-04-28 DIAGNOSIS — F43.9 SITUATIONAL STRESS: ICD-10-CM

## 2020-04-28 DIAGNOSIS — G89.29 CHRONIC INTRACTABLE HEADACHE, UNSPECIFIED HEADACHE TYPE: ICD-10-CM

## 2020-04-28 DIAGNOSIS — R51.9 CHRONIC INTRACTABLE HEADACHE, UNSPECIFIED HEADACHE TYPE: ICD-10-CM

## 2020-04-28 RX ORDER — GABAPENTIN 100 MG/1
100 CAPSULE ORAL
Qty: 90 CAP | Refills: 0 | Status: SHIPPED | OUTPATIENT
Start: 2020-04-28 | End: 2020-07-13 | Stop reason: SDUPTHER

## 2020-07-13 DIAGNOSIS — F32.89 OTHER DEPRESSION: ICD-10-CM

## 2020-07-13 DIAGNOSIS — G89.29 CHRONIC INTRACTABLE HEADACHE, UNSPECIFIED HEADACHE TYPE: ICD-10-CM

## 2020-07-13 DIAGNOSIS — F43.9 SITUATIONAL STRESS: ICD-10-CM

## 2020-07-13 DIAGNOSIS — R51.9 CHRONIC INTRACTABLE HEADACHE, UNSPECIFIED HEADACHE TYPE: ICD-10-CM

## 2020-07-13 RX ORDER — GABAPENTIN 100 MG/1
100 CAPSULE ORAL
Qty: 90 CAP | Refills: 0 | Status: SHIPPED | OUTPATIENT
Start: 2020-07-13 | End: 2020-11-20 | Stop reason: SDUPTHER

## 2020-07-13 RX ORDER — ZOLPIDEM TARTRATE 5 MG/1
5 TABLET ORAL
Qty: 30 TAB | Refills: 1 | Status: SHIPPED | OUTPATIENT
Start: 2020-07-13 | End: 2020-11-20 | Stop reason: SDUPTHER

## 2020-07-13 RX ORDER — SUMATRIPTAN 100 MG/1
TABLET, FILM COATED ORAL
Qty: 36 TAB | Refills: 1 | Status: SHIPPED | OUTPATIENT
Start: 2020-07-13 | End: 2021-03-06 | Stop reason: SDUPTHER

## 2020-08-27 DIAGNOSIS — J45.41 MODERATE PERSISTENT ASTHMA WITH ACUTE EXACERBATION: ICD-10-CM

## 2020-08-27 RX ORDER — MONTELUKAST SODIUM 10 MG/1
10 TABLET ORAL DAILY
Qty: 90 TAB | Refills: 3 | Status: SHIPPED | OUTPATIENT
Start: 2020-08-27 | End: 2021-09-18 | Stop reason: SDUPTHER

## 2020-10-07 ENCOUNTER — OFFICE VISIT (OUTPATIENT)
Dept: INTERNAL MEDICINE CLINIC | Age: 70
End: 2020-10-07
Payer: MEDICARE

## 2020-10-07 VITALS
BODY MASS INDEX: 27.43 KG/M2 | HEIGHT: 68 IN | SYSTOLIC BLOOD PRESSURE: 127 MMHG | TEMPERATURE: 98.7 F | DIASTOLIC BLOOD PRESSURE: 83 MMHG | HEART RATE: 80 BPM | RESPIRATION RATE: 12 BRPM | WEIGHT: 181 LBS | OXYGEN SATURATION: 96 %

## 2020-10-07 DIAGNOSIS — J45.41 MODERATE PERSISTENT ASTHMA WITH ACUTE EXACERBATION: ICD-10-CM

## 2020-10-07 DIAGNOSIS — Z12.31 ENCOUNTER FOR SCREENING MAMMOGRAM FOR MALIGNANT NEOPLASM OF BREAST: ICD-10-CM

## 2020-10-07 DIAGNOSIS — I10 ESSENTIAL HYPERTENSION: ICD-10-CM

## 2020-10-07 DIAGNOSIS — Z00.00 MEDICARE ANNUAL WELLNESS VISIT, SUBSEQUENT: Primary | ICD-10-CM

## 2020-10-07 DIAGNOSIS — Z13.31 SCREENING FOR DEPRESSION: ICD-10-CM

## 2020-10-07 DIAGNOSIS — E78.2 MIXED HYPERLIPIDEMIA: ICD-10-CM

## 2020-10-07 DIAGNOSIS — E03.9 ACQUIRED HYPOTHYROIDISM: ICD-10-CM

## 2020-10-07 DIAGNOSIS — E53.8 B12 DEFICIENCY DUE TO DIET: ICD-10-CM

## 2020-10-07 DIAGNOSIS — Z13.39 SCREENING FOR ALCOHOLISM: ICD-10-CM

## 2020-10-07 PROCEDURE — 3017F COLORECTAL CA SCREEN DOC REV: CPT | Performed by: INTERNAL MEDICINE

## 2020-10-07 PROCEDURE — G8428 CUR MEDS NOT DOCUMENT: HCPCS | Performed by: INTERNAL MEDICINE

## 2020-10-07 PROCEDURE — G9717 DOC PT DX DEP/BP F/U NT REQ: HCPCS | Performed by: INTERNAL MEDICINE

## 2020-10-07 PROCEDURE — G8419 CALC BMI OUT NRM PARAM NOF/U: HCPCS | Performed by: INTERNAL MEDICINE

## 2020-10-07 PROCEDURE — G9899 SCRN MAM PERF RSLTS DOC: HCPCS | Performed by: INTERNAL MEDICINE

## 2020-10-07 PROCEDURE — G0463 HOSPITAL OUTPT CLINIC VISIT: HCPCS | Performed by: INTERNAL MEDICINE

## 2020-10-07 PROCEDURE — 99214 OFFICE O/P EST MOD 30 MIN: CPT | Performed by: INTERNAL MEDICINE

## 2020-10-07 PROCEDURE — 1090F PRES/ABSN URINE INCON ASSESS: CPT | Performed by: INTERNAL MEDICINE

## 2020-10-07 PROCEDURE — G8754 DIAS BP LESS 90: HCPCS | Performed by: INTERNAL MEDICINE

## 2020-10-07 PROCEDURE — G0444 DEPRESSION SCREEN ANNUAL: HCPCS | Performed by: INTERNAL MEDICINE

## 2020-10-07 PROCEDURE — G8399 PT W/DXA RESULTS DOCUMENT: HCPCS | Performed by: INTERNAL MEDICINE

## 2020-10-07 PROCEDURE — G0439 PPPS, SUBSEQ VISIT: HCPCS | Performed by: INTERNAL MEDICINE

## 2020-10-07 PROCEDURE — G8752 SYS BP LESS 140: HCPCS | Performed by: INTERNAL MEDICINE

## 2020-10-07 PROCEDURE — G8536 NO DOC ELDER MAL SCRN: HCPCS | Performed by: INTERNAL MEDICINE

## 2020-10-07 PROCEDURE — 1101F PT FALLS ASSESS-DOCD LE1/YR: CPT | Performed by: INTERNAL MEDICINE

## 2020-10-07 RX ORDER — ALBUTEROL SULFATE 90 UG/1
2 AEROSOL, METERED RESPIRATORY (INHALATION)
Qty: 1 INHALER | Refills: 2 | Status: SHIPPED | OUTPATIENT
Start: 2020-10-07 | End: 2021-04-12 | Stop reason: SDUPTHER

## 2020-10-07 RX ORDER — HYDROCHLOROTHIAZIDE 12.5 MG/1
12.5 TABLET ORAL DAILY
Qty: 30 TAB | Refills: 2 | Status: SHIPPED | OUTPATIENT
Start: 2020-10-07 | End: 2020-12-29

## 2020-10-07 RX ORDER — LOSARTAN POTASSIUM 50 MG/1
50 TABLET ORAL DAILY
Qty: 30 TAB | Refills: 2 | Status: SHIPPED | OUTPATIENT
Start: 2020-10-07 | End: 2020-12-29

## 2020-10-07 RX ORDER — LOSARTAN POTASSIUM AND HYDROCHLOROTHIAZIDE 25; 100 MG/1; MG/1
1 TABLET ORAL DAILY
Qty: 30 TAB | Refills: 3 | Status: SHIPPED | OUTPATIENT
Start: 2020-10-07 | End: 2020-10-07 | Stop reason: ALTCHOICE

## 2020-10-07 NOTE — PATIENT INSTRUCTIONS
Medicare Wellness Visit, Female The best way to live healthy is to have a lifestyle where you eat a well-balanced diet, exercise regularly, limit alcohol use, and quit all forms of tobacco/nicotine, if applicable. Regular preventive services are another way to keep healthy. Preventive services (vaccines, screening tests, monitoring & exams) can help personalize your care plan, which helps you manage your own care. Screening tests can find health problems at the earliest stages, when they are easiest to treat. [facility] follows the current, evidence-based guidelines published by the [facility] Gaudencio Candelario ([facility]) when recommending preventive services for our patients. Because we follow these guidelines, sometimes recommendations change over time as research supports it. (For example, mammograms used to be recommended annually. Even though Medicare will still pay for an annual mammogram, the newer guidelines recommend a mammogram every two years for women of average risk). Of course, you and your doctor may decide to screen more often for some diseases, based on your risk and your co-morbidities (chronic disease you are already diagnosed with). Preventive services for you include: - Medicare offers their members a free annual wellness visit, which is time for you and your primary care provider to discuss and plan for your preventive service needs. Take advantage of this benefit every year! 
-All adults over the age of 72 should receive the recommended pneumonia vaccines. Current USPSTF guidelines recommend a series of two vaccines for the best pneumonia protection.  
-All adults should have a flu vaccine yearly and a tetanus vaccine every 10 years.  
-All adults age 48 and older should receive the shingles vaccines (series of two vaccines). -All adults age 38-68 who are overweight should have a diabetes screening test once every three years. -All adults born between [birth year] and 1965 should be screened once for Hepatitis C. 
-Other screening tests and preventive services for persons with diabetes include: an eye exam to screen for diabetic retinopathy, a kidney function test, a foot exam, and stricter control over your cholesterol.  
-Cardiovascular screening for adults with routine risk involves an electrocardiogram (ECG) at intervals determined by your doctor.  
-Colorectal cancer screenings should be done for adults age 54-65 with no increased risk factors for colorectal cancer. There are a number of acceptable methods of screening for this type of cancer. Each test has its own benefits and drawbacks. Discuss with your doctor what is most appropriate for you during your annual wellness visit. The different tests include: colonoscopy (considered the best screening method), a fecal occult blood test, a fecal DNA test, and sigmoidoscopy. 
 
-A bone mass density test is recommended when a woman turns 65 to screen for osteoporosis. This test is only recommended one time, as a screening. Some providers will use this same test as a disease monitoring tool if you already have osteoporosis. -Breast cancer screenings are recommended every other year for women of normal risk, age 54-69. 
-Cervical cancer screenings for women over age 72 are only recommended with certain risk factors. Here is a list of your current Health Maintenance items (your personalized list of preventive services) with a due date: 
Health Maintenance Due Topic Date Due  Glaucoma Screening   08/01/2017  Colon Cancer Stool Test  09/27/2017 [facility] Annual Well Visit  08/08/2020  Yearly Flu Vaccine (1) 09/01/2020

## 2020-10-07 NOTE — PROGRESS NOTES
Chief Complaint   Patient presents with    Hypothyroidism     f/up with labs      Patient presents for her follow-up of her thyroid and blood pressure. She is also here for her Medicare wellness visit. Colonoscopy   10/2019, dr. Sathya Hay, repeat in 5 years      Since last visit patient reports that she is now  from her . They will likely go to divorce. She notes again that they were fine when they were out with other people but when they were together she was supposed to be subservient to him. Asthma  Current control: Fair   Current level: mild intermittent  Current symptoms: cough night cough decreased exercise tolerance  Current controller: Breo  Last flare up: Now. She is currently using Advair 250/50. She has been using her albuterol 3 times a day. Her allergies are flaring her asthma symptoms. She was told to stay on the Advair 250/50. She does not step down to 100/50        Thyroid Disease:  Gracia Ott is a 65 y.o. female here for follow up of hypothyroidism. Lab Results   Component Value Date/Time    TSH 2.940 08/08/2019 11:48 AM     Residual symptoms denies fatigue, weight changes, heat/cold intolerance, bowel/skin changes or CVS symptoms. she denies denies fatigue, weight changes, heat/cold intolerance, bowel/skin changes or CVS symptoms  She has had difficulty with her online pharmacy in getting her medications. She has been off her thyroid medication for about a week. Her labs today would not reflect an accurate level. Subjective: Gracia Ott is a 65 y.o. female with hypertension. Hypertension ROS: taking medications as instructed, no medication side effects noted, no TIA's, no chest pain on exertion, no dyspnea on exertion, no swelling of ankles. New concerns: She reports she was not able to get her losartan hydrochlorothiazide for her online pharmacy.   She questions if she needs to stay on the medication as reading from the nurse was 127/83 today.      Hyperlipidemia  Patient denies side effects of medicines. She denies muscle aches and pain. Situational stress  Now officially  from her . She does not need counselor she has a terrific support group with her son, sister and best friend      Past Medical History:   Diagnosis Date    Arthritis     Asthma     Elevated lipids 9/15/2015    Essential hypertension 9/15/2015    Hypercholesterolemia     Hypothyroidism, adult 9/15/2015    Migraine     allergy mediated in sping and fall    Sun-damaged skin      Past Surgical History:   Procedure Laterality Date    COLONOSCOPY N/A 10/4/2019    COL[facility]COPY performed by [facility] at [facility] ENDOSCOPY    HX BREAST BIOPSY Left yrs ago    neg; surgical bx    HX  SECTION      HX CHOLECYSTECTOMY      HX CYST INCISION AND DRAINAGE Left over years    neg    HX KNEE ARTHROSCOPY Right     x 2    HX KNEE REPLACEMENT Right 2020    HX SHOULDER ARTHROSCOPY Left     HX TONSILLECTOMY       Social History     Socioeconomic History    Marital status:      Spouse name: Not on file    Number of children: Not on file    Years of education: Not on file    Highest education level: Not on file   Tobacco Use    Smoking status: Never Smoker    Smokeless tobacco: Never Used   Substance and Sexual Activity    Alcohol use:  Yes     Alcohol/week: 5.8 standard drinks     Types: 7 Glasses of wine per week    Drug use: No    Sexual activity: Yes     Partners: Male   Social History Narrative    Retired from [employer] in [place]    Stressful        Hx of situational stress     passed cancer duodenal    Mother was sick 79 yo with MS actually did well        Recently   In 2017        1 son, doing well     Family History   Problem Relation Age of Onset   [address] [facility] MS Mother     Cancer Mother         uterus/skin cancer    Cancer Father 72        colon    Parkinson's Disease Sister 61    MS Brother     Parkinson's Disease Brother      Current Outpatient Medications   Medication Sig Dispense Refill    albuterol (ProAir HFA) 90 mcg/actuation inhaler Take 2 Puffs by inhalation every four (4) hours as needed for Wheezing or Shortness of Breath for up to 90 days. 3 Inhaler 2    levothyroxine (synthroid) 50 mcg tablet Take 1 Tab by mouth Daily (before breakfast) for 90 days. 90 Tab 0    losartan-hydroCHLOROthiazide (HYZAAR) 100-25 mg per tablet Take 1 Tab by mouth daily for 90 days. 90 Tab 0    escitalopram oxalate (LEXAPRO) 20 mg tablet TAKE 1 TABLET DAILY 90 Tab 0    montelukast (SINGULAIR) 10 mg tablet Take 1 Tab by mouth daily. 90 Tab 3    SUMAtriptan (IMITREX) 100 mg tablet Take once daily as needed for migraine 36 Tab 1    gabapentin (NEURONTIN) 100 mg capsule Take 1 Cap by mouth nightly. Max Daily Amount: 100 mg. 90 Cap 0    fluticasone propion-salmeteroL (ADVAIR/WIXELA) 250-50 mcg/dose diskus inhaler Take 1 Puff by inhalation two (2) times a day. Rinse mouth after use 3 Each 1    Omega-3-DHA-EPA-Fish Oil 1,000 mg (120 mg-180 mg) cap Take  by mouth. Indications: not taking      multivitamin (ONE A DAY) tablet Take 1 Tab by mouth daily.  ascorbic acid (VITAMIN C) 500 mg tablet Take  by mouth. Indications: not taking      zolpidem (AMBIEN) 5 mg tablet Take 1 Tab by mouth nightly as needed for Sleep for up to 20 days. Max Daily Amount: 5 mg. Indications: difficulty falling asleep 30 Tab 1    atorvastatin (LIPITOR) 40 mg tablet Take 1 Tab by mouth nightly for 90 days.  Labs please 90 Tab 3     Allergies   Allergen Reactions    Sulfa (Sulfonamide Antibiotics) Anaphylaxis       Review of Systems - General ROS: positive for  - malaise and dysthymia  negative for - fever  Cardiovascular ROS: no chest pain or dyspnea on exertion  Respiratory ROS: positive for - cough and shortness of breath  negative for - stridor or tachypnea    Visit Vitals  /83 (BP 1 Location: Left arm, BP Patient Position: Sitting) Comment: 5 days without BP med   Pulse 80   Temp 98.7 °F (37.1 °C) (Oral)   Resp 12   Ht 5' 8\" (1.727 m)   Wt 181 lb (82.1 kg)   LMP 03/15/1998   SpO2 96%   BMI 27.52 kg/m²     General Appearance:  Well developed, well nourished,alert and oriented x 3, and individual in no acute distress. Ears/Nose/Mouth/Throat:   Hearing grossly normal.         Neck: Supple, no lad, no bruits   Chest:   Lungs clear to auscultation bilaterally. Cardiovascular:  Regular rate and rhythm, S1, S2 normal, no murmur. Abdomen:   Soft, non-tender, bowel sounds are active. Extremities: No edema bilaterally. Skin: Warm and dry, no suspicious lesions, hyperpigmented macules on face from dermatology                 Diagnoses and all orders for this visit:    1. Medicare annual wellness visit, subsequent patient presents for her Medicare wellness visit. Please see below. -     [facility] MAMMO BI SCREENING INCL CAD; Future    2. Essential hypertension  On recheck patient's blood pressure was 146/90. I think she should go back on her blood pressure medicine but at a lower dose. We will have her on losartan 50 mg and hydrochlorothiazide 12.5 mg.  -     METABOLIC PANEL, COMPREHENSIVE; Future    3. Moderate persistent asthma with acute exacerbation  Patient with severe asthma when flares requiring intubation  She was told to stay on her Advair and use albuterol as needed  -     albuterol (ProAir HFA) 90 mcg/actuation inhaler; Take 2 Puffs by inhalation every four (4) hours as needed for Wheezing or Shortness of Breath for up to 90 days. 4. Screening for alcoholism  Patient is not drinking alcohol    5. Screening for depression  Patient is not depressed  -     DEPRESSION SCREEN ANNUAL    6. Encounter for screening mammogram for malignant neoplasm of breast  Patient is due for mammogram  -     [facility] MAMMO BI SCREENING INCL CAD; Future    7. Acquired hypothyroidism  Patient has not been on her thyroid medication for about a week. She will restart her thyroid medication when it arrives and she will check her labs in 1 month. This will hopefully reflect more of her levels  -     TSH 3RD GENERATION; Future    8. Mixed hyperlipidemia  She has not been on atorvastatin for a year. We will recheck and reassess her cardiovascular risk profile  She may need to restart her statin  -     LIPID PANEL; Future    9. B12 deficiency due to diet  She is no longer on B12 supplementation and does not eat red meat. -     VITAMIN B12 & FOLATE; Future    Other orders  -     losartan (COZAAR) 50 mg tablet; Take 1 Tab by mouth daily. -     hydroCHLOROthiazide (HYDRODIURIL) 12.5 mg tablet; Take 1 Tab by mouth daily. This is the Subsequent Medicare Annual Wellness Exam, performed 12 months or more after the Initial AWV or the last Subsequent AWV    I have reviewed the patient's medical history in detail and updated the computerized patient record.      History     Patient Active Problem List   Diagnosis Code    Hypothyroidism, adult E03.9    Essential hypertension I10    Elevated lipids E78.5    Skin lesions, generalized L98.9    Asthma J45.909    Recurrent depression ([facility] [address] F33.9     Past Medical History:   Diagnosis Date    Arthritis     Asthma     Elevated lipids 9/15/2015    Essential hypertension 9/15/2015    Hypercholesterolemia     Hypothyroidism, adult 9/15/2015    Migraine     allergy mediated in sping and fall    Sun-damaged skin       Past Surgical History:   Procedure Laterality Date    COLONOSCOPY N/A 10/4/2019    COL[facility] at [facility] ENDOSCOPY    HX BREAST BIOPSY Left yrs ago    neg; surgical bx    HX  SECTION      HX CHOLECYSTECTOMY      HX CYST INCISION AND DRAINAGE Left over years    neg    HX KNEE ARTHROSCOPY Right     x 2    HX KNEE REPLACEMENT Right 2020    HX SHOULDER ARTHROSCOPY Left     HX TONSILLECTOMY       Current Outpatient Medications   Medication Sig Dispense Refill    albuterol (ProAir HFA) 90 mcg/actuation inhaler Take 2 Puffs by inhalation every four (4) hours as needed for Wheezing or Shortness of Breath for up to 90 days. 3 Inhaler 2    levothyroxine (synthroid) 50 mcg tablet Take 1 Tab by mouth Daily (before breakfast) for 90 days. 90 Tab 0    losartan-hydroCHLOROthiazide (HYZAAR) 100-25 mg per tablet Take 1 Tab by mouth daily for 90 days. 90 Tab 0    escitalopram oxalate (LEXAPRO) 20 mg tablet TAKE 1 TABLET DAILY 90 Tab 0    montelukast (SINGULAIR) 10 mg tablet Take 1 Tab by mouth daily. 90 Tab 3    SUMAtriptan (IMITREX) 100 mg tablet Take once daily as needed for migraine 36 Tab 1    gabapentin (NEURONTIN) 100 mg capsule Take 1 Cap by mouth nightly. Max Daily Amount: 100 mg. 90 Cap 0    fluticasone propion-salmeteroL (ADVAIR/WIXELA) 250-50 mcg/dose diskus inhaler Take 1 Puff by inhalation two (2) times a day. Rinse mouth after use 3 Each 1    Omega-3-DHA-EPA-Fish Oil 1,000 mg (120 mg-180 mg) cap Take  by mouth. Indications: not taking      multivitamin (ONE A DAY) tablet Take 1 Tab by mouth daily.  ascorbic acid (VITAMIN C) 500 mg tablet Take  by mouth. Indications: not taking      zolpidem (AMBIEN) 5 mg tablet Take 1 Tab by mouth nightly as needed for Sleep for up to 20 days. Max Daily Amount: 5 mg. Indications: difficulty falling asleep 30 Tab 1    atorvastatin (LIPITOR) 40 mg tablet Take 1 Tab by mouth nightly for 90 days. Labs please 90 Tab 3     Allergies   Allergen Reactions    Sulfa (Sulfonamide Antibiotics) Anaphylaxis       Family History   Problem Relation Age of Onset   [place] MS Mother     Cancer Mother         uterus/skin cancer    Cancer Father 72        colon    Parkinson's Disease Sister 61    MS Brother     Parkinson's Disease Brother      Social History     Tobacco Use    Smoking status: Never Smoker    Smokeless tobacco: Never Used   Substance Use Topics    Alcohol use:  Yes     Alcohol/week: 5.8 standard drinks Types: 7 Glasses of wine per week       Depression Risk Factor Screening:     3 most recent PHQ Screens 10/7/2020   Little interest or pleasure in doing things Not at all   Feeling down, depressed, irritable, or hopeless Not at all   Total Score PHQ 2 0   Trouble falling or staying asleep, or sleeping too much -   Feeling tired or having little energy -   Poor appetite, weight loss, or overeating -   Feeling bad about yourself - or that you are a failure or have let yourself or your family down -   Trouble concentrating on things such as school, work, reading, or watching TV -   Moving or speaking so slowly that other people could have noticed; or the opposite being so fidgety that others notice -   Thoughts of being better off dead, or hurting yourself in some way -   PHQ 9 Score -       Alcohol Risk Screen   Do you average more than 1 drink per night or more than 7 drinks a week:  No    On any one occasion in the past three months have you have had more than 3 drinks containing alcohol:  No        Functional Ability and Level of Safety:   Hearing: Hearing is good. Activities of Daily Living: The home contains: no safety equipment. Patient does total self care     Ambulation: with no difficulty     Fall Risk:  Fall Risk Assessment, last 12 mths 10/7/2020   Able to walk? Yes   Fall in past 12 months?  No   Fall with injury? -   Number of falls in past 12 months -   Fall Risk Score -     Abuse Screen:  Patient is not abused       Cognitive Screening   Has your family/caregiver stated any concerns about your memory: no     Cognitive Screening: Normal - Verbal Fluency Test    Patient Care Team   Patient Care Team:  Kevin Power MD as PCP - General (Internal Medicine)  Kevin Power MD as PCP - [facility]ILITATION [facility]  Massiel Phan MD as Physician (Sleep Medicine)    Assessment/Plan   Education and counseling provided:  Are appropriate based on today's review and evaluation  End-of-Life planning (with patient's consent)  Screening Mammography  Colorectal cancer screening tests  Screening for glaucoma    Diagnoses and all orders for this visit:    1. Essential hypertension    2. Moderate persistent asthma with acute exacerbation    3. Medicare annual wellness visit, subsequent    4. Screening for alcoholism    5. Screening for depression  -     DEPRESSION SCREEN ANNUAL    6. Encounter for screening mammogram for malignant neoplasm of breast  -     [facility] MAMMO BI SCREENING INCL CAD; Future    Aside from  patient's Medicare wellness visit I spent an additional 25 minutes with this patient greater than 50% of the time spent in management and counseling with regards to her blood pressure, thyroid and situational stress. Patient appears to be in a better place and more free and able to do things she enjoys. Empathetic listening provided and support given. She will check her labs in about a month after being on her thyroid medication. She will also check her blood pressure on the lower dose to see if this is adequate if her blood pressure continues to be greater than 140/80 she will increase to losartan 100 mg/hydrochlorothiazide 25.

## 2020-11-20 DIAGNOSIS — G89.29 CHRONIC INTRACTABLE HEADACHE, UNSPECIFIED HEADACHE TYPE: ICD-10-CM

## 2020-11-20 DIAGNOSIS — F32.89 OTHER DEPRESSION: ICD-10-CM

## 2020-11-20 DIAGNOSIS — F43.9 SITUATIONAL STRESS: ICD-10-CM

## 2020-11-20 DIAGNOSIS — R51.9 CHRONIC INTRACTABLE HEADACHE, UNSPECIFIED HEADACHE TYPE: ICD-10-CM

## 2020-11-20 RX ORDER — ZOLPIDEM TARTRATE 5 MG/1
5 TABLET ORAL
Qty: 30 TAB | Refills: 1 | Status: SHIPPED | OUTPATIENT
Start: 2020-11-20 | End: 2021-01-06 | Stop reason: SDUPTHER

## 2020-11-20 RX ORDER — GABAPENTIN 100 MG/1
100 CAPSULE ORAL
Qty: 90 CAP | Refills: 0 | Status: SHIPPED | OUTPATIENT
Start: 2020-11-20 | End: 2021-03-06 | Stop reason: SDUPTHER

## 2020-11-23 ENCOUNTER — HOSPITAL ENCOUNTER (OUTPATIENT)
Dept: LAB | Age: 70
Discharge: HOME | End: 2020-11-23
Payer: MEDICARE

## 2020-11-23 PROCEDURE — 36415 COLL VENOUS BLD VENIPUNCTURE: CPT

## 2020-11-23 PROCEDURE — 80061 LIPID PANEL: CPT

## 2020-11-23 PROCEDURE — 82607 VITAMIN B-12: CPT

## 2020-11-23 PROCEDURE — 80053 COMPREHEN METABOLIC PANEL: CPT

## 2020-11-23 PROCEDURE — 84443 ASSAY THYROID STIM HORMONE: CPT

## 2020-11-24 ENCOUNTER — NURSE TRIAGE (OUTPATIENT)
Dept: OTHER | Facility: CLINIC | Age: 70
End: 2020-11-24

## 2020-11-24 LAB
ALBUMIN SERPL-MCNC: 4.1 G/DL (ref 3.7–4.7)
ALBUMIN/GLOB SERPL: 1.7 {RATIO} (ref 1.2–2.2)
ALP SERPL-CCNC: 61 IU/L (ref 39–117)
ALT SERPL-CCNC: 12 IU/L (ref 0–32)
AST SERPL-CCNC: 15 IU/L (ref 0–40)
BILIRUB SERPL-MCNC: 0.3 MG/DL (ref 0–1.2)
BUN SERPL-MCNC: 16 MG/DL (ref 8–27)
BUN/CREAT SERPL: 20 (ref 12–28)
CALCIUM SERPL-MCNC: 9 MG/DL (ref 8.7–10.3)
CHLORIDE SERPL-SCNC: 104 MMOL/L (ref 96–106)
CHOLEST SERPL-MCNC: 263 MG/DL (ref 100–199)
CO2 SERPL-SCNC: 27 MMOL/L (ref 20–29)
CREAT SERPL-MCNC: 0.82 MG/DL (ref 0.57–1)
FOLATE SERPL-MCNC: 7.3 NG/ML
GLOBULIN SER CALC-MCNC: 2.4 G/DL (ref 1.5–4.5)
GLUCOSE SERPL-MCNC: 94 MG/DL (ref 65–99)
HDLC SERPL-MCNC: 60 MG/DL
INTERPRETATION, 910389: NORMAL
LDLC SERPL CALC-MCNC: 182 MG/DL (ref 0–99)
POTASSIUM SERPL-SCNC: 4.5 MMOL/L (ref 3.5–5.2)
PROT SERPL-MCNC: 6.5 G/DL (ref 6–8.5)
SODIUM SERPL-SCNC: 144 MMOL/L (ref 134–144)
TRIGL SERPL-MCNC: 117 MG/DL (ref 0–149)
TSH SERPL DL<=0.005 MIU/L-ACNC: 2.69 UIU/ML (ref 0.45–4.5)
VIT B12 SERPL-MCNC: 645 PG/ML (ref 232–1245)
VLDLC SERPL CALC-MCNC: 21 MG/DL (ref 5–40)

## 2020-11-24 NOTE — TELEPHONE ENCOUNTER
Reason for Disposition   [1] COVID-19 infection suspected by caller or triager AND [2] mild symptoms (cough, fever, or others) AND [8] no complications or SOB    Answer Assessment - Initial Assessment Questions  1. COVID-19 DIAGNOSIS: \"Who made your Coronavirus (COVID-19) diagnosis? \" \"Was it confirmed by a positive lab test?\" If not diagnosed by a HCP, ask \"Are there lots of cases (community spread) where you live? \" (See public health department website, if unsure)     Pt has been in contact with someone who tested positive    2. COVID-19 EXPOSURE: \"Was there any known exposure to COVID before the symptoms began? \" CDC Definition of close contact: within 6 feet (2 meters) for a total of 15 minutes or more over a 24-hour period. Has been around someone who was exposed     3. ONSET: \"When did the COVID-19 symptoms start? \"   Started Sunday 11/22/2020    4. WORST SYMPTOM: \"What is your worst symptom? \" (e.g., cough, fever, shortness of breath, muscle aches)    Sore throat, fatigue    5. COUGH: \"Do you have a cough? \" If so, ask: \"How bad is the cough? \"       Dry cough, raspy cough    6. FEVER: \"Do you have a fever? \" If so, ask: \"What is your temperature, how was it measured, and when did it start? \"   denies    7. RESPIRATORY STATUS: \"Describe your breathing? \" (e.g., shortness of breath, wheezing, unable to speak)    denies    8. BETTER-SAME-WORSE: Zula Paulino you getting better, staying the same or getting worse compared to yesterday? \"  If getting worse, ask, \"In what way? \"  Feel worse today    9. HIGH RISK DISEASE: \"Do you have any chronic medical problems? \" (e.g., asthma, heart or lung disease, weak immune system, obesity, etc.)   asthma, hypertension    10. PREGNANCY: \"Is there any chance you are pregnant? \" \"When was your last menstrual period? \"      Denies    11. OTHER SYMPTOMS: \"Do you have any other symptoms? \"  (e.g., chills, fatigue, headache, loss of smell or taste, muscle pain, sore throat; new loss of smell or taste especially support the diagnosis of COVID-19)       Raspy voice, sore throat, fatigue, headache    Protocols used: CORONAVIRUS (COVID-19) DIAGNOSED OR SUSPECTED-ADULT-  pt calling wit c/o COVID symptoms, triaged with disposition of call PCP when office is open for advice/ care. Pt stated she had already called today inquire about a rapid test and the PCP office did not provide this service. [facility] Department and web address for [facility] given to patient for resources. Pt agreed with disposition and writer informed pt to call back with any further questions or concerns.

## 2020-12-29 RX ORDER — LOSARTAN POTASSIUM 50 MG/1
TABLET ORAL
Qty: 30 TAB | Refills: 1 | Status: SHIPPED | OUTPATIENT
Start: 2020-12-29 | End: 2021-01-06 | Stop reason: SDUPTHER

## 2020-12-29 RX ORDER — HYDROCHLOROTHIAZIDE 12.5 MG/1
TABLET ORAL
Qty: 30 TAB | Refills: 1 | Status: SHIPPED | OUTPATIENT
Start: 2020-12-29 | End: 2021-01-06 | Stop reason: SDUPTHER

## 2021-01-06 ENCOUNTER — PATIENT MESSAGE (OUTPATIENT)
Dept: INTERNAL MEDICINE CLINIC | Age: 71
End: 2021-01-06

## 2021-01-06 DIAGNOSIS — E03.9 ACQUIRED HYPOTHYROIDISM: ICD-10-CM

## 2021-01-06 RX ORDER — LEVOTHYROXINE SODIUM 50 UG/1
50 TABLET ORAL
Qty: 90 TAB | Refills: 0 | Status: SHIPPED | OUTPATIENT
Start: 2021-01-06 | End: 2021-01-08 | Stop reason: SDUPTHER

## 2021-01-06 RX ORDER — LOSARTAN POTASSIUM 50 MG/1
50 TABLET ORAL DAILY
Qty: 30 TAB | Refills: 1 | Status: SHIPPED | OUTPATIENT
Start: 2021-01-06 | End: 2021-03-06 | Stop reason: SDUPTHER

## 2021-01-06 RX ORDER — HYDROCHLOROTHIAZIDE 12.5 MG/1
12.5 TABLET ORAL DAILY
Qty: 30 TAB | Refills: 1 | Status: SHIPPED | OUTPATIENT
Start: 2021-01-06 | End: 2021-03-06 | Stop reason: SDUPTHER

## 2021-01-06 NOTE — TELEPHONE ENCOUNTER
From: Ruth Ann Mg  To: Jas Obando MD  Sent: 1/6/2021 9:38 AM EST  Subject: Prescription Question    Good morning and happy new year!  i am requesting a new prescription for l-thyroxine but am unable to change pharmacies just for this one prescription through the patient portal.  I would like it called in to the [address] [place] [address] [facility].   Thanks,  Italia Cai

## 2021-01-08 DIAGNOSIS — E03.9 ACQUIRED HYPOTHYROIDISM: ICD-10-CM

## 2021-01-08 DIAGNOSIS — F32.89 OTHER DEPRESSION: ICD-10-CM

## 2021-01-08 RX ORDER — LEVOTHYROXINE SODIUM 50 UG/1
50 TABLET ORAL
Qty: 90 TAB | Refills: 0 | Status: SHIPPED | OUTPATIENT
Start: 2021-01-08 | End: 2021-04-03

## 2021-01-08 RX ORDER — ZOLPIDEM TARTRATE 5 MG/1
5 TABLET ORAL
Qty: 20 TAB | Refills: 0 | Status: SHIPPED | OUTPATIENT
Start: 2021-01-08 | End: 2021-03-06 | Stop reason: SDUPTHER

## 2021-02-07 ENCOUNTER — PATIENT MESSAGE (OUTPATIENT)
Dept: INTERNAL MEDICINE CLINIC | Age: 71
End: 2021-02-07

## 2021-02-07 DIAGNOSIS — M25.50 ARTHRALGIA, UNSPECIFIED JOINT: Primary | ICD-10-CM

## 2021-02-09 NOTE — TELEPHONE ENCOUNTER
From: Don Turcios  To: Damon Galvan MD  Sent: 2/7/2021 7:56 PM EST  Subject: Referral Request    Can you please recommend a rheumatologist?

## 2021-02-10 DIAGNOSIS — F32.89 OTHER DEPRESSION: ICD-10-CM

## 2021-02-10 RX ORDER — ESCITALOPRAM OXALATE 20 MG/1
TABLET ORAL
Qty: 90 TAB | Refills: 3 | Status: SHIPPED | OUTPATIENT
Start: 2021-02-10 | End: 2022-03-24 | Stop reason: SDUPTHER

## 2021-03-06 DIAGNOSIS — G89.29 CHRONIC INTRACTABLE HEADACHE, UNSPECIFIED HEADACHE TYPE: ICD-10-CM

## 2021-03-06 DIAGNOSIS — F43.9 SITUATIONAL STRESS: ICD-10-CM

## 2021-03-06 DIAGNOSIS — R51.9 CHRONIC INTRACTABLE HEADACHE, UNSPECIFIED HEADACHE TYPE: ICD-10-CM

## 2021-03-06 DIAGNOSIS — F32.89 OTHER DEPRESSION: ICD-10-CM

## 2021-03-08 RX ORDER — SUMATRIPTAN 100 MG/1
TABLET, FILM COATED ORAL
Qty: 36 TAB | Refills: 1 | Status: SHIPPED | OUTPATIENT
Start: 2021-03-08 | End: 2021-03-09

## 2021-03-08 RX ORDER — HYDROCHLOROTHIAZIDE 12.5 MG/1
12.5 TABLET ORAL DAILY
Qty: 30 TAB | Refills: 1 | Status: SHIPPED | OUTPATIENT
Start: 2021-03-08 | End: 2021-05-09 | Stop reason: SDUPTHER

## 2021-03-08 RX ORDER — GABAPENTIN 100 MG/1
100 CAPSULE ORAL
Qty: 90 CAP | Refills: 0 | Status: SHIPPED | OUTPATIENT
Start: 2021-03-08 | End: 2021-07-22 | Stop reason: SDUPTHER

## 2021-03-08 RX ORDER — ZOLPIDEM TARTRATE 5 MG/1
5 TABLET ORAL
Qty: 20 TAB | Refills: 0 | Status: SHIPPED | OUTPATIENT
Start: 2021-03-08 | End: 2021-05-09 | Stop reason: SDUPTHER

## 2021-03-08 RX ORDER — LOSARTAN POTASSIUM 50 MG/1
50 TABLET ORAL DAILY
Qty: 30 TAB | Refills: 1 | Status: SHIPPED | OUTPATIENT
Start: 2021-03-08 | End: 2021-05-09 | Stop reason: SDUPTHER

## 2021-03-09 DIAGNOSIS — G89.29 CHRONIC INTRACTABLE HEADACHE, UNSPECIFIED HEADACHE TYPE: ICD-10-CM

## 2021-03-09 DIAGNOSIS — R51.9 CHRONIC INTRACTABLE HEADACHE, UNSPECIFIED HEADACHE TYPE: ICD-10-CM

## 2021-03-09 RX ORDER — SUMATRIPTAN 100 MG/1
TABLET, FILM COATED ORAL
Qty: 18 TAB | Refills: 0 | Status: SHIPPED | OUTPATIENT
Start: 2021-03-09 | End: 2021-04-12 | Stop reason: SDUPTHER

## 2021-03-26 ENCOUNTER — OFFICE VISIT (OUTPATIENT)
Dept: RHEUMATOLOGY | Age: 71
End: 2021-03-26
Payer: MEDICARE

## 2021-03-26 VITALS
DIASTOLIC BLOOD PRESSURE: 75 MMHG | TEMPERATURE: 97.5 F | HEIGHT: 68 IN | HEART RATE: 81 BPM | SYSTOLIC BLOOD PRESSURE: 116 MMHG | OXYGEN SATURATION: 95 % | BODY MASS INDEX: 26.92 KG/M2 | WEIGHT: 177.6 LBS | RESPIRATION RATE: 20 BRPM

## 2021-03-26 DIAGNOSIS — M19.011 PRIMARY OSTEOARTHRITIS OF BOTH SHOULDERS: ICD-10-CM

## 2021-03-26 DIAGNOSIS — M19.012 PRIMARY OSTEOARTHRITIS OF BOTH SHOULDERS: ICD-10-CM

## 2021-03-26 DIAGNOSIS — M15.9 PRIMARY OSTEOARTHRITIS INVOLVING MULTIPLE JOINTS: Primary | ICD-10-CM

## 2021-03-26 PROCEDURE — 3017F COLORECTAL CA SCREEN DOC REV: CPT | Performed by: INTERNAL MEDICINE

## 2021-03-26 PROCEDURE — G8536 NO DOC ELDER MAL SCRN: HCPCS | Performed by: INTERNAL MEDICINE

## 2021-03-26 PROCEDURE — G9717 DOC PT DX DEP/BP F/U NT REQ: HCPCS | Performed by: INTERNAL MEDICINE

## 2021-03-26 PROCEDURE — G8754 DIAS BP LESS 90: HCPCS | Performed by: INTERNAL MEDICINE

## 2021-03-26 PROCEDURE — 1101F PT FALLS ASSESS-DOCD LE1/YR: CPT | Performed by: INTERNAL MEDICINE

## 2021-03-26 PROCEDURE — 1090F PRES/ABSN URINE INCON ASSESS: CPT | Performed by: INTERNAL MEDICINE

## 2021-03-26 PROCEDURE — 99204 OFFICE O/P NEW MOD 45 MIN: CPT | Performed by: INTERNAL MEDICINE

## 2021-03-26 PROCEDURE — G8399 PT W/DXA RESULTS DOCUMENT: HCPCS | Performed by: INTERNAL MEDICINE

## 2021-03-26 PROCEDURE — G0463 HOSPITAL OUTPT CLINIC VISIT: HCPCS | Performed by: INTERNAL MEDICINE

## 2021-03-26 PROCEDURE — G8752 SYS BP LESS 140: HCPCS | Performed by: INTERNAL MEDICINE

## 2021-03-26 PROCEDURE — G8419 CALC BMI OUT NRM PARAM NOF/U: HCPCS | Performed by: INTERNAL MEDICINE

## 2021-03-26 PROCEDURE — G8427 DOCREV CUR MEDS BY ELIG CLIN: HCPCS | Performed by: INTERNAL MEDICINE

## 2021-03-26 PROCEDURE — G9899 SCRN MAM PERF RSLTS DOC: HCPCS | Performed by: INTERNAL MEDICINE

## 2021-03-26 RX ORDER — ALBUTEROL SULFATE 90 UG/1
AEROSOL, METERED RESPIRATORY (INHALATION)
COMMUNITY
Start: 2020-09-28 | End: 2021-04-12

## 2021-03-26 NOTE — LETTER
4/2/2021 Patient: Bayron Castillo YOB: 1950 Date of Visit: 3/26/2021 Leo Cunningham MD 
[address] [facility] [address] [place] [id number] [address] [employer] Dear Leo Cunningham MD, Thank you for referring Ms. Ingrid Tello to [address] [place] [address] for evaluation. My notes for this consultation are attached. If you have questions, please do not hesitate to call me. I look forward to following your patient along with you.  
 
 
Sincerely, 
 
Lennox Peat, MD

## 2021-03-26 NOTE — PROGRESS NOTES
REASON FOR VISIT:    is a 66 y.o. female with history of hypertension and hypothyroidism who is being referred to [facility] Rheumatology at the request of Dr. Farhan Tran, regarding a diagnosis of joint pain. HISTORY OF PRESENT ILLNESS    Pain is longstanding and in neck, lower back, shoulders, fingers, ankles. Would like to make sure this isn't rheumatoid arthritis, and if osteoarthritis ensure she's doing everything she can to help it. Gradually worsening over the last 2-3 years. Had steroid injection in her left CMC without any benefit. No particular joint swelling, some bony prominence of PIPs in particular. Doesn't wear rings any more. Retired, works part time as a  at a computer (10hr/week). Walks 3mi/day with her corgy. Drinks 2 quarts of water/day. Has longstanding dry eyes and mouth she attributes to her allergies. Right knee replacement was October 2019. REVIEW OF SYSTEMS  A comprehensive review of systems was negative except for that written in the HPI. A 10-point review of systems is per the new patient questionnaire, which has been reviewed extensively and scanned into the electronic medical record for future reference. Review of systems is as above and is otherwise negative. ALLERGIES  Sulfa (sulfonamide antibiotics)    MEDICATIONS  Current Outpatient Medications   Medication Sig    albuterol (ProAir HFA) 90 mcg/actuation inhaler     SUMAtriptan (IMITREX) 100 mg tablet TAKE ONE TABLET BY MOUTH ONCE DAILY AS NEEDED FOR MIGRAINE    gabapentin (NEURONTIN) 100 mg capsule Take 1 Cap by mouth nightly. Max Daily Amount: 100 mg.  hydroCHLOROthiazide (HYDRODIURIL) 12.5 mg tablet Take 1 Tab by mouth daily.  losartan (COZAAR) 50 mg tablet Take 1 Tab by mouth daily.  zolpidem (AMBIEN) 5 mg tablet Take 1 Tab by mouth nightly as needed for Sleep for up to 20 days. Max Daily Amount: 5 mg.  Indications: difficulty falling asleep    escitalopram oxalate (LEXAPRO) 20 mg tablet TAKE 1 TABLET DAILY    levothyroxine (synthroid) 50 mcg tablet Take 1 Tab by mouth Daily (before breakfast) for 90 days.  montelukast (SINGULAIR) 10 mg tablet Take 1 Tab by mouth daily.  fluticasone propion-salmeteroL (ADVAIR/WIXELA) 250-50 mcg/dose diskus inhaler Take 1 Puff by inhalation two (2) times a day. Rinse mouth after use    multivitamin (ONE A DAY) tablet Take 1 Tab by mouth daily.  albuterol (ProAir HFA) 90 mcg/actuation inhaler Take 2 Puffs by inhalation every four (4) hours as needed for Wheezing or Shortness of Breath for up to 90 days.  atorvastatin (LIPITOR) 40 mg tablet Take 1 Tab by mouth nightly for 90 days. Labs please    Omega-3-DHA-EPA-Fish Oil 1,000 mg (120 mg-180 mg) cap Take  by mouth. Indications: not taking    ascorbic acid (VITAMIN C) 500 mg tablet Take  by mouth. Indications: not taking     No current facility-administered medications for this visit. PAST MEDICAL HISTORY  Past Medical History:   Diagnosis Date    Arthritis     Asthma     Elevated lipids 9/15/2015    Essential hypertension 9/15/2015    Hypercholesterolemia     Hypothyroidism, adult 9/15/2015    Migraine     allergy mediated in sping and fall    Sun-damaged skin        FAMILY HISTORY  family history includes Cancer in her mother; Cancer (age of onset: 72) in her father; MS in her brother and mother; Parkinson's Disease in her brother; Parkinson's Disease (age of onset: 61) in her sister. SOCIAL HISTORY  She  reports that she has never smoked. She has never used smokeless tobacco. She reports current alcohol use of about 5.8 standard drinks of alcohol per week. She reports that she does not use drugs.   Social History     Social History Narrative    Retired from [employer] in [place]    Stressful        Hx of situational stress     passed cancer duodenal    Mother was sick 79 yo with MS actually did well        Recently   In March 2017 1 son, doing well       DATA  Visit Vitals  /75 (BP 1 Location: Left upper arm, BP Patient Position: Sitting)   Pulse 81   Temp 97.5 °F (36.4 °C) (Oral)   Resp 20   Ht 5' 8\" (1.727 m)   Wt 177 lb 9.6 oz (80.6 kg)   LMP 03/15/1998   SpO2 95%   BMI 27.00 kg/m²    Body mass index is 27 kg/m². No flowsheet data found. General:  The patient is well developed, well nourished, alert, and in no apparent distress. Eyes: Sclera are anicteric. No conjunctival injection. HEENT:  Oropharynx is clear. No oral ulcers. Adequate salivary pooling. No cervical or supraclavicular lymphadenopathy. Lungs:  Clear to auscultation bilaterally, without wheeze or stridor. Normal respiratory effort. Cor:  Regular rate and rhythm. No murmur rub or gallop. Abdomen: Soft, non-tender, without hepatomegaly or masses. Extremities: No calf tenderness or edema. Warm and well perfused. Skin:  Photodistributed poikiloderma. No petechial, purpuric, or psoriaform rashes. Normal nailfold capillaries. No sclerodactyly. Neuro: Nonfocal  Musculoskeletal:    A comprehensive musculoskeletal exam was performed for all joints of each upper and lower extremity and assessed for swelling, tenderness and range of motion. Results are documented as below:  Marked bilateral shoulder crepitus  Bilateral CMC squaring  Amanda > Heberden nodes, no synovitis  Right knee replacement without warmth/swelling  No MTP squeeze tenderness  No evidence of synovitis in the small joints of the hands, wrists, shoulders, elbows, hips, knees or ankles. Labs:  20: CBMP WNL (Cr 0.82)  19 CBC WNL    Imagin19 MR R knee:  1. Severe osteoarthritis in the medial compartment with mild-to-moderate  surrounding degenerative edema. 2. Complex tearing of the junction of body and posterior horn medial meniscus  with degenerative flattening of the posterior horn and posterior root. There is  meniscal extrusion.   3. High-grade cartilage loss in the medial patellar facet with a small focus of  underlying edema. 8/13/19 DXA:  Impression: This patient is osteopenic using the World Health Organization criteria  10 year probability of major osteoporotic fracture: 9.5%  10 year probability of hip fracture: 1.2%       ASSESSMENT AND PLAN  Ms. Bere Rodriguez is a 66 y.o. female who presents for evaluation of polyarticular degenerative character joint pains particularly in the shoulders and hands due to osteoarthritis. Reviewed conservative management of osteoarthritis, provided reassurance of no evident inflammatory arthritis. 1. Primary osteoarthritis involving multiple joints  - XR HAND RT MIN 3 V; Future  - XR HAND LT MIN 3 V; Future  - XR SHOULDER RT AP/LAT MIN 2 V; Future  - XR SHOULDER LT AP/LAT MIN 2 V; Future    2. Primary osteoarthritis of both shoulders  - REFERRAL TO PHYSICAL THERAPY    Patient Instructions   1. Your joints look typical for osteoarthritis. Let's obtain baseline xrays to be sure there aren't surprises; if your larger joints ever become hot/swollen let me know and we can bring you in quickly for further workup and possibly injections. 2. For pain, you can add tylenol 650mg up to 4x/day to most other pain medications. Sparing use of Aleve, 1-2 pills up to twice a day can be helpful though increases the risk of stomach bleeds and stroke/heart attack particulalry when used consistently. Topical treatments such as Voltaren can be helpful for the small joints; numbing topical treatments can be helpful, eg Salonpas, Tigerbalm, Blue Emu Oil    3. Herbal treatments such as glucosamine-chondroitin has been shown to be helpful for some people with knee osteoarthritis; turmeric also may be helpful. Wobenzyme is a proprietary blend of bromelain and papain which can perform similarly to Aleve with fewer side effects. 4. There is a a fourfold reduction in knee forces for every unit of weight lost (Arthritis Rheum. 2005 Jul;52(7):2026-32).     5. Keep working with your PCP on pain management; if something changes, let us know. Orders Placed This Encounter    XR HAND RT MIN 3 V    XR HAND LT MIN 3 V    XR SHOULDER RT AP/LAT MIN 2 V    XR SHOULDER LT AP/LAT MIN 2 V    REFERRAL TO PHYSICAL THERAPY    albuterol (ProAir HFA) 90 mcg/actuation inhaler       Medications: I am having Shelia Araya. Daisy Rodriguez maintain her multivitamin, ascorbic acid (vitamin C), omega 3-DHA-EPA-fish oil, atorvastatin, fluticasone propion-salmeteroL, montelukast, albuterol, levothyroxine, escitalopram oxalate, gabapentin, hydroCHLOROthiazide, losartan, zolpidem, SUMAtriptan, and albuterol. Follow up: Return if symptoms worsen or fail to improve.     Face to face time: 35 minutes  Note preparation and records review day of service: 15 minutes  Total provider time day of service: 50 minutes    Claudia Marc MD    Adult Rheumatology   [address] [place] [address]   Phone [phone]  Fax [phone]

## 2021-04-01 ENCOUNTER — HOSPITAL ENCOUNTER (OUTPATIENT)
Dept: GENERAL RADIOLOGY | Age: 71
Discharge: HOME | End: 2021-04-01
Attending: INTERNAL MEDICINE
Payer: MEDICARE

## 2021-04-01 DIAGNOSIS — M15.9 PRIMARY OSTEOARTHRITIS INVOLVING MULTIPLE JOINTS: ICD-10-CM

## 2021-04-01 PROCEDURE — 73130 X-RAY EXAM OF HAND: CPT

## 2021-04-01 PROCEDURE — 73030 X-RAY EXAM OF SHOULDER: CPT

## 2021-04-03 DIAGNOSIS — E03.9 ACQUIRED HYPOTHYROIDISM: ICD-10-CM

## 2021-04-03 RX ORDER — LEVOTHYROXINE SODIUM 50 UG/1
TABLET ORAL
Qty: 90 TAB | Refills: 3 | Status: SHIPPED | OUTPATIENT
Start: 2021-04-03 | End: 2021-04-07 | Stop reason: SDUPTHER

## 2021-04-12 ENCOUNTER — PATIENT MESSAGE (OUTPATIENT)
Dept: INTERNAL MEDICINE CLINIC | Age: 71
End: 2021-04-12

## 2021-04-12 DIAGNOSIS — R51.9 CHRONIC INTRACTABLE HEADACHE, UNSPECIFIED HEADACHE TYPE: ICD-10-CM

## 2021-04-12 DIAGNOSIS — J45.20 MILD INTERMITTENT REACTIVE AIRWAY DISEASE WITHOUT COMPLICATION: ICD-10-CM

## 2021-04-12 DIAGNOSIS — G89.29 CHRONIC INTRACTABLE HEADACHE, UNSPECIFIED HEADACHE TYPE: ICD-10-CM

## 2021-04-12 DIAGNOSIS — J45.41 MODERATE PERSISTENT ASTHMA WITH ACUTE EXACERBATION: ICD-10-CM

## 2021-04-12 RX ORDER — FLUTICASONE PROPIONATE AND SALMETEROL 250; 50 UG/1; UG/1
1 POWDER RESPIRATORY (INHALATION) 2 TIMES DAILY
Qty: 3 EACH | Refills: 1 | Status: SHIPPED | OUTPATIENT
Start: 2021-04-12 | End: 2021-04-14

## 2021-04-12 RX ORDER — SUMATRIPTAN 100 MG/1
TABLET, FILM COATED ORAL
Qty: 18 TAB | Refills: 0 | Status: SHIPPED | OUTPATIENT
Start: 2021-04-12 | End: 2021-04-15 | Stop reason: SDUPTHER

## 2021-04-12 RX ORDER — ALBUTEROL SULFATE 90 UG/1
2 AEROSOL, METERED RESPIRATORY (INHALATION)
Qty: 1 INHALER | Refills: 2 | Status: SHIPPED | OUTPATIENT
Start: 2021-04-12 | End: 2021-05-02

## 2021-04-12 NOTE — TELEPHONE ENCOUNTER
From: Irlanda Alcala  To: Shelly Shepard MD  Sent: 4/12/2021 8:02 AM EDT  Subject: Prescription Question    Good morning,    Will you please write new prescriptions for the following asthma meds. I am no longer seeing the allergy specialist.  Pharm[facility] for the inhalers [facility]  (its in my file)    albuterol 60 inhaler  advair 500/50 diskus    Thank you,  Yadi Pope

## 2021-04-14 ENCOUNTER — TELEPHONE (OUTPATIENT)
Dept: INTERNAL MEDICINE CLINIC | Age: 71
End: 2021-04-14

## 2021-04-14 RX ORDER — FLUTICASONE PROPIONATE AND SALMETEROL 50; 250 UG/1; UG/1
1 POWDER RESPIRATORY (INHALATION) EVERY 12 HOURS
Qty: 3 EACH | Refills: 1 | Status: SHIPPED | OUTPATIENT
Start: 2021-04-14 | End: 2021-08-09

## 2021-04-15 ENCOUNTER — HOSPITAL ENCOUNTER (OUTPATIENT)
Dept: PHYSICAL THERAPY | Age: 71
Discharge: HOME | End: 2021-04-15
Payer: MEDICARE

## 2021-04-15 DIAGNOSIS — G89.29 CHRONIC INTRACTABLE HEADACHE, UNSPECIFIED HEADACHE TYPE: ICD-10-CM

## 2021-04-15 DIAGNOSIS — R51.9 CHRONIC INTRACTABLE HEADACHE, UNSPECIFIED HEADACHE TYPE: ICD-10-CM

## 2021-04-15 PROCEDURE — 97161 PT EVAL LOW COMPLEX 20 MIN: CPT

## 2021-04-15 PROCEDURE — 97110 THERAPEUTIC EXERCISES: CPT

## 2021-04-15 RX ORDER — SUMATRIPTAN 100 MG/1
TABLET, FILM COATED ORAL
Qty: 18 TAB | Refills: 0 | Status: SHIPPED | OUTPATIENT
Start: 2021-04-15 | End: 2021-06-25 | Stop reason: SDUPTHER

## 2021-04-15 NOTE — PROGRESS NOTES
PT INITIAL EVALUATION NOTE - [facility] 2-15    Patient Name: Francisca Gant  Date:4/15/2021  : 1950  [x]  Patient  Verified  Payor: VA MEDICARE / Plan: VA MEDICARE PART A & B / Product Type: Medicare /    In time: 9:10 AM Out time: 10:05: AM  Total Treatment Time (min): 55  Total Timed Codes (min): 55  1:1 Treatment Time ( [address] only): 55   Visit #: 1     Treatment Area: Bilateral shoulder pain [M25.511, M25.512]     SUBJECTIVE  Pain Level (0-10 scale): 10 at worst, 2 at best, 2 current because not moving  Any medication changes, allergies to medications, adverse drug reactions, diagnosis change, or new procedure performed?: [] No    [x] Yes (see summary sheet for update)  Subjective:    Chief complaint: Chronic arthritic pain in the wrists, shoulders, back, and ankles, exacerbation over last 6 months. Grossly worse L>R. Notes worst and constant pain in the wrists. Aggravating factors: Laying down aggravates shoulders, any motion of L hand, difficulty opening jars and medicine bottles with L hand (R handed)   Easing factors: Braces, Voltaren gel, Aleve, Gabapentin   Numbness/tingling: Denies   PLOF: Independent with ADL's, active in [employer] at [employer]  Mechanism of Injury: Insidious onset, gradual decline characteristic of OA  Previous Treatment/Compliance: Imaging revealed OA in aforementioned joints, received an injection in L wrist and a thumb splint brace for day and larger brace for night, referred to PT.     PMHx/Surgical Hx: R knee replacement, L shoulder arthroscopy, cholecystectomy,  section, asthma, hypothyroidism, HTN, hypercholesterolemia  Work Hx: Part time working at [employer] Situation: No stairs at home,  from  living in an apartment   Pt Goals: Pain reduction, opening jars without difficulty, reach overhead without pain   Barriers: Chronicity of symptoms   Motivation: Motivated   Substance use: Denies  Cognition: A & O x 4        OBJECTIVE/EXAMINATION  Posture: Mild forward head     Joint Mobility: Noted crepitus with all joint mobility    Cervical AROM      R   L  Flexion    40  Extension   28  Rot    65   60  SB     20   10, P! Shoulder AROM prior to pain onset, full available range:         R   L  Shoulder Flexion  50, 160  75, 140    Shoulder Abduction   80, 150  80, 155              MMT:   Shoulder flexion  5   5  Abduction   5   5  Shoulder ER    4, p!   5   Shoulder IR     4+   4+    Wrist AROM      R   L  Flexion     60   80  Extension   55   75    MMT:      R   L  Extension   Grossly 5/5    Flexion       p!   Supination  Pronation  Radial deviation  Ulnar deviation       62#   51.4#     Neurological: Sensations: Intact and equal     Special Tests:   Distraction: (+) pain relief shoulder and wrist       15 min Therapeutic Exercise:  [x] See flow sheet :   Rationale: increase ROM and increase strength to improve the patients ability to play the bell's in [employer] choir            With   [x] TE   [] TA   [] Neuro   [] SC   [] other: Patient Education: [x] Review HEP    [] Progressed/Changed HEP based on:   [x] positioning   [] body mechanics   [] transfers   [] heat/ice application    [] other:      Other Objective/Functional Measures: FOTO Functional Measure: 48/100           Pain Level (0-10 scale) post treatment: 2    ASSESSMENT/Changes in Function:     [x]  See Plan of Jonelle Parker 25, DPT 4/15/2021

## 2021-04-15 NOTE — PROGRESS NOTES
Physical Therapy at [facility],   a part of  [facility]  [facility]  [facility] Elvie Gorman  Phone: [phone]  Fax: [phone]    Plan of Care/Statement of Necessity for Physical Therapy Services  2-15    Patient name: Kedar Fry  : 1950  Provider#: [id number]  Referral source: Yolette See MD      Medical/Treatment Diagnosis: Bilateral shoulder pain [M25.511, M25.512]     Prior Hospitalization: see medical history     Comorbidities:  asthma, hypothyroidism, HTN, hypercholesterolemia  Prior Level of Function: See initial evaluation     Medications: Verified on Patient Summary List  Start of Care: 4/15/21      Onset Date: 10/2020   The Plan of Care and following information is based on the information from the initial evaluation. Assessment/ key information: Patient is a 66year old female presenting with arthritic pain in bilateral wrists, shoulders, back, and ankles which limits her functional ability to reach overhead without pain, open tight jars or medication bottles, and play the bell's in her [employer] choir. Evaluation focused on upper quarter impairments 2/2 time constraints, plan to add ankles and low back next session. Primary deficits assessed include pain without limitation in range in all shoulder, wrist, and cervical mobility. Strength grossly WFL. Comorbidities and/or treatment precautions include latex allergy, HTN, and asthma. Patient will benefit from skilled PT to address all below listed deficits.         Evaluation Complexity History HIGH Complexity :3+ comorbidities / personal factors will impact the outcome/ POC ; Examination MEDIUM Complexity : 3 Standardized tests and measures addressing body structure, function, activity limitation and / or participation in recreation  ;Presentation LOW Complexity : Stable, uncomplicated  ;Clinical Decision Making MEDIUM Complexity : FOTO score of 26-74  Overall Complexity Rating: LOW     Problem List: pain affecting function, decrease ROM, decrease ADL/ functional abilitiies and decrease flexibility/ joint mobility   Treatment Plan may include any combination of the following: Therapeutic exercise, Therapeutic activities, Neuromuscular re-education, Physical agent/modality, Gait/balance training, Manual therapy, Patient education, Self Care training, Functional mobility training, Home safety training and Stair training  Patient / Family readiness to learn indicated by: asking questions, trying to perform skills and interest  Persons(s) to be included in education: patient (P)  Barriers to Learning/Limitations: None  Patient Goal (s): To maintain function in my hands, I can't slow down  Patient Self Reported Health Status: good  Rehabilitation Potential: excellent    Short Term Goals: To be accomplished in 4 weeks:  1. Patient will be independent with initial HEP in order to transition to general wellness program.  2. Patient will demonstrate AROM shoulder flexion to 100 degrees bilaterally to demonstrate progress in return to overhead activities. 3. Patient will be able to perform pain free scapular retraction to build postural strengthening program.   Long Term Goals: To be accomplished in 12 weeks:  1. Patient will demonstrate pain free flexion to 150 degrees bilaterally to return to overhead reaching activities. 2. Patient will report no more than 2/10 pain with opening bottles in order to independently access her medication  3. Patient will be able to play bell's in her [employer] choir with no more than 2/10 wrist pain  Frequency / Duration: Patient to be seen 1 times per week for 12 weeks.     Patient/ Caregiver education and instruction: self care, activity modification, brace/ splint application and exercises    [x]  Plan of care has been reviewed with PTA        Certification Period: 4/15/21 - 7/15/21  Gato Osuna DPT 4/15/2021 ________________________________________________________________________    I certify that the above Therapy Services are being furnished while the patient is under my care. I agree with the treatment plan and certify that this therapy is necessary.     Physician's Signature:____________________  Date:____________Time: _________         Kimo Hastings MD

## 2021-04-23 ENCOUNTER — HOSPITAL ENCOUNTER (OUTPATIENT)
Dept: PHYSICAL THERAPY | Age: 71
Discharge: HOME | End: 2021-04-23
Payer: MEDICARE

## 2021-04-23 PROCEDURE — 97110 THERAPEUTIC EXERCISES: CPT

## 2021-04-23 NOTE — PROGRESS NOTES
PT DAILY TREATMENT NOTE - [facility] 2-15    Patient Name: Sharmin Villeda  Date:2021  : 1950  [x]  Patient  Verified  Payor: [address] [place] [address] / Plan: VA MEDICARE PART A & B / Product Type: Medicare /    In time:9 a  Out time:9:50 a  Total Treatment Time (min): 50  Total Timed Codes (min): 50  1:1 Treatment Time ([facility] only): 50   Visit #:  2    Treatment Area: Bilateral shoulder pain [M25.511, M25.512]    SUBJECTIVE  Pain Level (0-10 scale): 1  Any medication changes, allergies to medications, adverse drug reactions, diagnosis change, or new procedure performed?: [x] No    [] Yes (see summary sheet for update)  Subjective functional status/changes:   [] No changes reported  Patient reports that she is feeling good today, the putty squeezes have been helping her wrists and hands. OBJECTIVE      50 min Therapeutic Exercise:  [x] See flow sheet :   Rationale: increase ROM and increase strength to improve the patients ability to open jars without pain and difficulty             With   [x] TE   [] TA   [] Neuro   [] SC   [] other: Patient Education: [x] Review HEP    [x] Progressed/Changed HEP based on: progress in status   [] positioning   [] body mechanics   [] transfers   [] heat/ice application    [] other:      Other Objective/Functional Measures:      Pain Level (0-10 scale) post treatment: 1    ASSESSMENT/Changes in Function:   Patient noted pain in radial deviation exercise and stretch in wrist flexion which abolished with rest.  HEP progressed, print out provided to patient. Patient will continue to benefit from skilled PT services to modify and progress therapeutic interventions, address functional mobility deficits, address ROM deficits, address strength deficits and analyze and address soft tissue restrictions to attain remaining goals.      [x]  See Plan of Care  []  See progress note/recertification  []  See Discharge Summary         Progress towards goals / Updated goals:  Patient demonstrates good progress toward all short term goals.      PLAN  [x]  Upgrade activities as tolerated     [x]  Continue plan of care  [x]  Update interventions per flow sheet       []  Discharge due to:_  []  Other:_      Carollee Schlatter, DPT 4/23/2021

## 2021-04-30 ENCOUNTER — HOSPITAL ENCOUNTER (OUTPATIENT)
Dept: PHYSICAL THERAPY | Age: 71
Discharge: HOME | End: 2021-04-30
Payer: MEDICARE

## 2021-04-30 PROCEDURE — 97110 THERAPEUTIC EXERCISES: CPT

## 2021-04-30 NOTE — PROGRESS NOTES
PT DAILY TREATMENT NOTE - [facility] 2-15    Patient Name: Jesus Manuel Crowell  Date:2021  : 1950  [x]  Patient  Verified  Payor: VA MEDICARE / Plan: VA MEDICARE PART A & B / Product Type: Medicare /    In time:9 a  Out time:10:00 a  Total Treatment Time (min): 60  Total Timed Codes (min): 50  1:1 Treatment Time ([facility] only):  50  Visit #:  3    Treatment Area: Bilateral shoulder pain [M25.511, M25.512]    SUBJECTIVE  Pain Level (0-10 scale): 1  Any medication changes, allergies to medications, adverse drug reactions, diagnosis change, or new procedure performed?: [x] No    [] Yes (see summary sheet for update)  Subjective functional status/changes:   [] No changes reported  \"I'm feeling good, like really really good! I have had no pain in my shoulders or wrists keeping me up at night. Just some pain in my low back which may be from stretching too much. \"     OBJECTIVE       Modality rationale: decrease pain and increase tissue extensibility to improve the patients ability to participate in [employer] boxing class    Min Type Additional Details    [] Estim: []Att   []Unatt        []TENS instruct                  []IFC  []Premod   []NMES                     []Other:  []w/US   []w/ice   []w/heat  Position:  Location:    []  Traction: [] Cervical       []Lumbar                       [] Prone          []Supine                       []Intermittent   []Continuous Lbs:  [] before manual  [] after manual  []w/heat    []  Ultrasound: []Continuous   [] Pulsed at:                           []1MHz   []3MHz Location:  W/cm2:    [] Paraffin         Location:   []w/heat   10 []  Ice     [x]  Heat  []  Ice massage Position: Supine  Location: Low back     []  Laser  []  Other: Position:  Location:      []  Vasopneumatic Device Pressure:       [] lo [] med [] hi   Temperature:      [x] Skin assessment post-treatment:  [x]intact []redness- no adverse reaction    []redness  adverse reaction:     50 min Therapeutic Exercise:  [x] See flow sheet :   Rationale: increase ROM and increase strength to improve the patients ability to participate in [employer] boxing classes             With   [x] TE   [] TA   [] Neuro   [] SC   [] other: Patient Education: [x] Review HEP    [x] Progressed/Changed HEP based on: progress in status   [] positioning   [] body mechanics   [] transfers   [] heat/ice application    [] other:      Other Objective/Functional Measures:      Pain Level (0-10 scale) post treatment: 0    ASSESSMENT/Changes in Function:   Focus of session on LBP, recreation of sx with lumbar rotation. Introduction of hip flexibility and core strength exercises well tolerated. Patient will continue to benefit from skilled PT services to modify and progress therapeutic interventions, address functional mobility deficits, address ROM deficits, address strength deficits and analyze and address soft tissue restrictions to attain remaining goals. [x]  See Plan of Care  []  See progress note/recertification  []  See Discharge Summary         Progress towards goals / Updated goals:  Patient demonstrates good progress toward all short term goals.      PLAN  [x]  Upgrade activities as tolerated     [x]  Continue plan of care  [x]  Update interventions per flow sheet       []  Discharge due to:_  []  Other:_      Destinee Villalobos DPT 4/30/2021

## 2021-05-01 DIAGNOSIS — J45.41 MODERATE PERSISTENT ASTHMA WITH ACUTE EXACERBATION: ICD-10-CM

## 2021-05-02 RX ORDER — ALBUTEROL SULFATE 90 UG/1
AEROSOL, METERED RESPIRATORY (INHALATION)
Qty: 25.5 G | Refills: 3 | Status: SHIPPED | OUTPATIENT
Start: 2021-05-02 | End: 2021-07-08 | Stop reason: SDUPTHER

## 2021-05-07 ENCOUNTER — APPOINTMENT (OUTPATIENT)
Dept: PHYSICAL THERAPY | Age: 71
End: 2021-05-07
Payer: MEDICARE

## 2021-05-09 DIAGNOSIS — F32.89 OTHER DEPRESSION: ICD-10-CM

## 2021-05-09 RX ORDER — ZOLPIDEM TARTRATE 5 MG/1
5 TABLET ORAL
Qty: 20 TAB | Refills: 0 | Status: SHIPPED | OUTPATIENT
Start: 2021-05-09 | End: 2021-07-22 | Stop reason: SDUPTHER

## 2021-05-09 RX ORDER — LOSARTAN POTASSIUM 50 MG/1
50 TABLET ORAL DAILY
Qty: 30 TAB | Refills: 1 | Status: SHIPPED | OUTPATIENT
Start: 2021-05-09 | End: 2021-07-07 | Stop reason: SDUPTHER

## 2021-05-09 RX ORDER — HYDROCHLOROTHIAZIDE 12.5 MG/1
12.5 TABLET ORAL DAILY
Qty: 30 TAB | Refills: 1 | Status: SHIPPED | OUTPATIENT
Start: 2021-05-09 | End: 2021-07-07 | Stop reason: SDUPTHER

## 2021-05-14 ENCOUNTER — HOSPITAL ENCOUNTER (OUTPATIENT)
Dept: PHYSICAL THERAPY | Age: 71
Discharge: HOME | End: 2021-05-14
Payer: MEDICARE

## 2021-05-14 PROCEDURE — 97110 THERAPEUTIC EXERCISES: CPT

## 2021-05-14 NOTE — PROGRESS NOTES
PT DAILY TREATMENT NOTE - [facility] 2-15    Patient Name: Valentino Rhein  Date:2021  : 1950  [x]  Patient  Verified  Payor: Cici Mood / Plan: [place] & [place] / Product Type: Medicare /    In time:9 a  Out time:9:50 a  Total Treatment Time (min): 50  Total Timed Codes (min): 50  1:1 Treatment Time ( [address] only): 50  Visit #:  4    Treatment Area: Bilateral shoulder pain [M25.511, M25.512]    SUBJECTIVE  Pain Level (0-10 scale): \"Splitting headache\"  Any medication changes, allergies to medications, adverse drug reactions, diagnosis change, or new procedure performed?: [x] No    [] Yes (see summary sheet for update)  Subjective functional status/changes:   [] No changes reported  \"I have a splitting headache from the pollen and taking care of my neighbors cat, but otherwise I'm feeling great. The only thing that's lingering is my back pain and some L shoulder. \"     OBJECTIVE    50 min Therapeutic Exercise:  [x] See flow sheet :   Rationale: increase ROM and increase strength to improve the patients ability to participate in [employer] boxing classes             With   [x] TE   [] TA   [] Neuro   [] SC   [] other: Patient Education: [x] Review HEP    [x] Progressed/Changed HEP based on: progress in status   [] positioning   [] body mechanics   [] transfers   [] heat/ice application    [] other:      Other Objective/Functional Measures:     Shoulder flexion AROM: R 155 L 145  Cervical AROM- Side bend R 15 L 20 bend, rotation R 65 L 60, no pain with cervical motion   Wrist AROM- flexion R 76 L 75, extension R 80 L 60  MMT- grossly same grade as initial evaluation, no pain noted with resistance   :   L 60 #   R 70 #    Pain Level (0-10 scale) post treatment: 0    ASSESSMENT/Changes in Function:   Focus of session on L shoulder. Horizontal abduction with pain in B shoulders, decreased with exercise completed in 45 degrees of flexion instead of 90.      Patient will continue to benefit from skilled PT services to modify and progress therapeutic interventions, address functional mobility deficits, address ROM deficits, address strength deficits and analyze and address soft tissue restrictions to attain remaining goals. []  See Plan of Care  [x]  See progress note/recertification  []  See Discharge Summary         Progress towards goals / Updated goals:  1. Patient will be independent with initial HEP in order to transition to general wellness program. MET  2. Patient will demonstrate AROM shoulder flexion to 100 degrees bilaterally to demonstrate progress in return to overhead activities. MET  3. Patient will be able to perform pain free scapular retraction to build postural strengthening program. MET  Long Term Goals: To be accomplished in 12 weeks:  1. Patient will demonstrate pain free flexion to 150 degrees bilaterally to return to overhead reaching activities. Progressing toward   2. Patient will report no more than 2/10 pain with opening bottles in order to independently access her medication. Progressing toward- weak not painful   3.  Patient will be able to play bell's in her [employer] choir with no more than 2/10 wrist pain MET     PLAN  [x]  Upgrade activities as tolerated     [x]  Continue plan of care  [x]  Update interventions per flow sheet       []  Discharge due to:_  []  Other:_      Sheila Reyna DPT 5/14/2021

## 2021-05-14 NOTE — PROGRESS NOTES
Physical Therapy at [facility],   a part of  [facility]  [address]  Phone: [phone]      Fax:  [phone]    Progress Note    Name: Blanche Soliman   : 1950   MD: Ryan Luz MD       Treatment Diagnosis: Bilateral shoulder pain [M25.511, M25.512]  Start of Care: 4/15/21    Visits from Start of Care: 4  Missed Visits: 1    Summary of Care: At time of reassessment, patient has been seen for 4 visits. Sessions have focused on patient education regarding activity modification and therapeutic exercise for the wrists, shoulder, and low back. Wrist pain has abolished and strengthening remains an area for improvement. Focus of sessions will shift to shoulder and low back as patient demonstrates independence with wrist interventions. Assessment / Recommendations: Patient has made excellent progress in therapy, noting no longer experiencing pain in her wrist that wakes her up at night. She has made modest improvements in LBP and shoulder apin although these have been transient thus far. Patient will benefit from continued skilled services 1x/week for 8 weeks to address remaining wrist weakness and shoulder/LBP. 1. Patient will be independent with initial HEP in order to transition to general wellness program.   Status at last note/certification: Met  Status at progress note: met  2. Patient will demonstrate AROM shoulder flexion to 100 degrees bilaterally to demonstrate progress in return to overhead activities. Status at last note/certification: Met  Status at progress note: met  3. Patient will be able to perform pain free scapular retraction to build postural strengthening program.   Status at last note/certification: Met  Status at progress note: met    1. Patient will demonstrate pain free flexion to 150 degrees bilaterally to return to overhead reaching activities.    Status at last note/certification: Progressing toward  Status at progress note: not met  2. Patient will report no more than 2/10 pain with opening bottles in order to independently access her medication. Status at last note/certification: Progressing toward  Status at progress note: not met  3.  Patient will be able to play bell's in her [employer] choir with no more than 2/10 wrist pain    Status at last note/certification: Progressing toward  Status at progress note: met      Sheila Reyna DPT 5/14/2021

## 2021-05-24 ENCOUNTER — HOSPITAL ENCOUNTER (OUTPATIENT)
Dept: PHYSICAL THERAPY | Age: 71
Discharge: HOME | End: 2021-05-24
Payer: MEDICARE

## 2021-05-24 PROCEDURE — 97110 THERAPEUTIC EXERCISES: CPT

## 2021-05-24 PROCEDURE — 97140 MANUAL THERAPY 1/> REGIONS: CPT

## 2021-05-24 NOTE — PROGRESS NOTES
PT DAILY TREATMENT NOTE - [facility] 2-15    Patient Name: Daniel Fabian  Date:2021  : 1950  [x]  Patient  Verified  Payor: Mariam Mcelroy / Plan: [place] MEDICARE PART A & B / Product Type: Medicare /    In time:4:30 a  Out time: 4:15 a  Total Treatment Time (min): 45  Total Timed Codes (min): 45  1:1 Treatment Time ( [address] only): 45  Visit #:  5    Treatment Area: Bilateral shoulder pain [M25.511, M25.512]    SUBJECTIVE  Pain Level (0-10 scale): 3 in shoulder   Any medication changes, allergies to medications, adverse drug reactions, diagnosis change, or new procedure performed?: [x] No    [] Yes (see summary sheet for update)  Subjective functional status/changes:   [] No changes reported  Patient reports that she only has pain in her shoulder, no where else. Her wrist has been throbbing intermittently but she believes that is because of the weather. She is in emotional distress today over her sister's health status. OBJECTIVE    30 min Therapeutic Exercise:  [x] See flow sheet :   Rationale: increase ROM and increase strength to improve the patients ability to participate in [employer] boxing classes     15 min Manual Therapy: R lats, posterior cuff, rhomboids STM     Rationale: decrease pain, increase ROM and increase tissue extensibility to improve the patients ability to reach overhead             With   [x] TE   [] TA   [] Neuro   [] SC   [] other: Patient Education: [x] Review HEP    [x] Progressed/Changed HEP based on: progress in status   [] positioning   [] body mechanics   [] transfers   [] heat/ice application    [] other:      Other Objective/Functional Measures:       Pain Level (0-10 scale) post treatment: 3    ASSESSMENT/Changes in Function:   Patient in uncharacteristic low spirits throughout session. Exercises causing slight increased in pain in posterolateral shoulder, positive relief from throbbing pain with manual intervention, pain with abduction remains.   Patient is hurry to leave session, encouraged to apply heat to her shoulder at home. At end of session patient reported that she is changing insurances and will return to therapy when she figured out new coverage- HEP was reviewed and questions were clarified. Patient will continue to benefit from skilled PT services to modify and progress therapeutic interventions, address functional mobility deficits, address ROM deficits, address strength deficits and analyze and address soft tissue restrictions to attain remaining goals. []  See Plan of Care  [x]  See progress note/recertification  []  See Discharge Summary         Progress towards goals / Updated goals:  1. Patient will be independent with initial HEP in order to transition to general wellness program. MET  2. Patient will demonstrate AROM shoulder flexion to 100 degrees bilaterally to demonstrate progress in return to overhead activities. MET  3. Patient will be able to perform pain free scapular retraction to build postural strengthening program. MET  Long Term Goals: To be accomplished in 12 weeks:  1. Patient will demonstrate pain free flexion to 150 degrees bilaterally to return to overhead reaching activities. Progressing toward   2. Patient will report no more than 2/10 pain with opening bottles in order to independently access her medication. Progressing toward- weak not painful   3.  Patient will be able to play bell's in her [employer] choir with no more than 2/10 wrist pain MET     PLAN  [x]  Upgrade activities as tolerated     [x]  Continue plan of care  [x]  Update interventions per flow sheet       []  Discharge due to:_  []  Other:_      Phi Cintron DPT 5/24/2021

## 2021-06-25 DIAGNOSIS — R51.9 CHRONIC INTRACTABLE HEADACHE, UNSPECIFIED HEADACHE TYPE: ICD-10-CM

## 2021-06-25 DIAGNOSIS — G89.29 CHRONIC INTRACTABLE HEADACHE, UNSPECIFIED HEADACHE TYPE: ICD-10-CM

## 2021-06-28 RX ORDER — SUMATRIPTAN 100 MG/1
TABLET, FILM COATED ORAL
Qty: 18 TABLET | Refills: 0 | Status: SHIPPED | OUTPATIENT
Start: 2021-06-28 | End: 2021-08-09 | Stop reason: SDUPTHER

## 2021-07-07 ENCOUNTER — PATIENT MESSAGE (OUTPATIENT)
Dept: INTERNAL MEDICINE CLINIC | Age: 71
End: 2021-07-07

## 2021-07-07 DIAGNOSIS — J45.41 MODERATE PERSISTENT ASTHMA WITH ACUTE EXACERBATION: ICD-10-CM

## 2021-07-08 NOTE — TELEPHONE ENCOUNTER
From: Vaishali Sharpe  To: Iris Weeks MD  Sent: 7/7/2021 3:41 PM EDT  Subject: Update Medical Information    I need a refill for the ProAir inhaler, but the pharmacy needs to be updated, which I cannot do on my end. The prescription should go to [facility]. Thank you.

## 2021-07-09 RX ORDER — ALBUTEROL SULFATE 90 UG/1
AEROSOL, METERED RESPIRATORY (INHALATION)
Qty: 3 INHALER | Refills: 3 | Status: SHIPPED | OUTPATIENT
Start: 2021-07-09 | End: 2022-01-21 | Stop reason: SDUPTHER

## 2021-07-22 DIAGNOSIS — G89.29 CHRONIC INTRACTABLE HEADACHE, UNSPECIFIED HEADACHE TYPE: ICD-10-CM

## 2021-07-22 DIAGNOSIS — R51.9 CHRONIC INTRACTABLE HEADACHE, UNSPECIFIED HEADACHE TYPE: ICD-10-CM

## 2021-07-22 DIAGNOSIS — F32.89 OTHER DEPRESSION: ICD-10-CM

## 2021-07-22 DIAGNOSIS — F43.9 SITUATIONAL STRESS: ICD-10-CM

## 2021-07-23 RX ORDER — GABAPENTIN 100 MG/1
100 CAPSULE ORAL
Qty: 90 CAPSULE | Refills: 0 | Status: SHIPPED | OUTPATIENT
Start: 2021-07-23 | End: 2022-03-24 | Stop reason: SDUPTHER

## 2021-07-23 RX ORDER — FLUTICASONE PROPIONATE AND SALMETEROL 50; 250 UG/1; UG/1
1 POWDER RESPIRATORY (INHALATION) EVERY 12 HOURS
Qty: 3 EACH | Refills: 1 | OUTPATIENT
Start: 2021-07-23

## 2021-07-23 RX ORDER — ZOLPIDEM TARTRATE 5 MG/1
5 TABLET ORAL
Qty: 20 TABLET | Refills: 0 | Status: SHIPPED | OUTPATIENT
Start: 2021-07-23 | End: 2021-09-18 | Stop reason: SDUPTHER

## 2021-07-23 NOTE — TELEPHONE ENCOUNTER
PSR reached out to patient to schedule a med check, no answer - left VM and sent [employer] message

## 2021-07-26 NOTE — PROGRESS NOTES
Physical Therapy at [facility],   a part of  [facility]  [address] [facility], [address]  Phone: [phone] Fax: [phone]      Discharge Summary 2-15    Patient name: Rolly Vargas  : 1950  Provider#: [id number]  Referral source: Alicia Davila MD      Medical/Treatment Diagnosis: Bilateral shoulder pain [M25.511, M25.512]     Prior Hospitalization: see medical history     Comorbidities: See Plan of Care  Prior Level of Function: See Plan of Care  Medications: Verified on Patient Summary List    Start of Care: 4/15/21      Onset Date:10/2020   Visits from Start of Care: 5     Missed Visits: 2  Reporting Period : 4/15/21 to 21    Assessment/Summary of care: Patient made excellent progress in arthritic wrist and shoulder pain and function throughout episode of care. She was independent with her HEP and diligent in completing exercises as well as staying active in her daily life. At last attended session, patient noted upcoming change of insurance as well as emotional stressors in her life but explained she would return to therapy once everything was sorted out. Due to lack of follow up, patient is assumed self-discharged. Goals assessed at last visit provided below. Short term goals: To be accomplished in 4 weeks:  1. Patient will be independent with initial HEP in order to transition to general wellness program. MET  2. Patient will demonstrate AROM shoulder flexion to 100 degrees bilaterally to demonstrate progress in return to overhead activities. MET  3. Patient will be able to perform pain free scapular retraction to build postural strengthening program. MET  Long Term Goals: To be accomplished in 12 weeks:  1. Patient will demonstrate pain free flexion to 150 degrees bilaterally to return to overhead reaching activities. Not met   2.  Patient will report no more than 2/10 pain with opening bottles in order to independently access her medication. Not met- weak not painful   3.  Patient will be able to play bell's in her [employer] choir with no more than 2/10 wrist pain MET         RECOMMENDATIONS:  [x]Discontinue therapy: []Patient has reached or is progressing toward set goals     [x]Patient is non-compliant or has abdicated     []Due to lack of appreciable progress towards set goals     []Aramni Liriano DPT 7/26/2021

## 2021-08-09 ENCOUNTER — OFFICE VISIT (OUTPATIENT)
Dept: INTERNAL MEDICINE CLINIC | Age: 71
End: 2021-08-09
Payer: COMMERCIAL

## 2021-08-09 VITALS
HEIGHT: 68 IN | WEIGHT: 176 LBS | HEART RATE: 94 BPM | RESPIRATION RATE: 14 BRPM | DIASTOLIC BLOOD PRESSURE: 80 MMHG | SYSTOLIC BLOOD PRESSURE: 127 MMHG | BODY MASS INDEX: 26.67 KG/M2 | OXYGEN SATURATION: 98 % | TEMPERATURE: 98.5 F

## 2021-08-09 DIAGNOSIS — E78.2 MIXED HYPERLIPIDEMIA: ICD-10-CM

## 2021-08-09 DIAGNOSIS — E03.9 ACQUIRED HYPOTHYROIDISM: ICD-10-CM

## 2021-08-09 DIAGNOSIS — R51.9 CHRONIC INTRACTABLE HEADACHE, UNSPECIFIED HEADACHE TYPE: ICD-10-CM

## 2021-08-09 DIAGNOSIS — J45.41 MODERATE PERSISTENT ASTHMA WITH ACUTE EXACERBATION: Primary | ICD-10-CM

## 2021-08-09 DIAGNOSIS — F33.9 RECURRENT DEPRESSION (HCC): ICD-10-CM

## 2021-08-09 DIAGNOSIS — G89.29 CHRONIC INTRACTABLE HEADACHE, UNSPECIFIED HEADACHE TYPE: ICD-10-CM

## 2021-08-09 PROCEDURE — G8399 PT W/DXA RESULTS DOCUMENT: HCPCS | Performed by: INTERNAL MEDICINE

## 2021-08-09 PROCEDURE — G8754 DIAS BP LESS 90: HCPCS | Performed by: INTERNAL MEDICINE

## 2021-08-09 PROCEDURE — G9899 SCRN MAM PERF RSLTS DOC: HCPCS | Performed by: INTERNAL MEDICINE

## 2021-08-09 PROCEDURE — 1101F PT FALLS ASSESS-DOCD LE1/YR: CPT | Performed by: INTERNAL MEDICINE

## 2021-08-09 PROCEDURE — G8419 CALC BMI OUT NRM PARAM NOF/U: HCPCS | Performed by: INTERNAL MEDICINE

## 2021-08-09 PROCEDURE — G9717 DOC PT DX DEP/BP F/U NT REQ: HCPCS | Performed by: INTERNAL MEDICINE

## 2021-08-09 PROCEDURE — 3017F COLORECTAL CA SCREEN DOC REV: CPT | Performed by: INTERNAL MEDICINE

## 2021-08-09 PROCEDURE — 99214 OFFICE O/P EST MOD 30 MIN: CPT | Performed by: INTERNAL MEDICINE

## 2021-08-09 PROCEDURE — 1090F PRES/ABSN URINE INCON ASSESS: CPT | Performed by: INTERNAL MEDICINE

## 2021-08-09 PROCEDURE — G8427 DOCREV CUR MEDS BY ELIG CLIN: HCPCS | Performed by: INTERNAL MEDICINE

## 2021-08-09 PROCEDURE — G8536 NO DOC ELDER MAL SCRN: HCPCS | Performed by: INTERNAL MEDICINE

## 2021-08-09 PROCEDURE — G8752 SYS BP LESS 140: HCPCS | Performed by: INTERNAL MEDICINE

## 2021-08-09 RX ORDER — PREDNISONE 20 MG/1
TABLET ORAL
Qty: 14 TABLET | Refills: 0 | Status: SHIPPED | OUTPATIENT
Start: 2021-08-09 | End: 2021-11-01

## 2021-08-09 RX ORDER — FLUTICASONE PROPIONATE AND SALMETEROL 250; 50 UG/1; UG/1
1 POWDER RESPIRATORY (INHALATION) EVERY 12 HOURS
Qty: 1 EACH | Refills: 1 | Status: SHIPPED | OUTPATIENT
Start: 2021-08-09 | End: 2022-03-03

## 2021-08-09 RX ORDER — SUMATRIPTAN 100 MG/1
TABLET, FILM COATED ORAL
Qty: 18 TABLET | Refills: 3 | Status: SHIPPED | OUTPATIENT
Start: 2021-08-09 | End: 2021-09-18 | Stop reason: SDUPTHER

## 2021-08-09 NOTE — PROGRESS NOTES
Diagnoses and all orders for this visit:    1. Moderate persistent asthma with acute exacerbation  New symptoms  Left lower lobe wheezing  She will let me know if her symptoms are not improving  -     fluticasone propion-salmeteroL (Wixela Inhub) 250-50 mcg/dose diskus inhaler; Take 1 Puff by inhalation every twelve (12) hours. Rinse mouth after use  Indications: controller medication for asthma  -     predniSONE (DELTASONE) 20 mg tablet; Take 3 po for 2 days then 2 tabs po for  2 days then 1 tab po daily    2. Recurrent depression ([address]  Under divorce proceedings with her   She is currently on Lexapro for anxiety  Triggering her bereavement from her first   Offered counseling, defers for now    3. Chronic intractable headache, unspecified headache type  Not optimally controlled  She will try 200 mg if needed  -     SUMAtriptan (IMITREX) 100 mg tablet; TAKE ONE TABLET BY MOUTH ONCE DAILY AS NEEDED FOR MIGRAINE ini can take another in 2 hours max 200 mg/day    4. Mixed hyperlipidemia  Aberrant increase of LDL to 180  Will recheck cholesterol in November  -     LIPID PANEL; Future    5. Acquired hypothyroidism  Stable  Recheck in November  -     TSH 3RD GENERATION; Future    She will MyChart me in follow-up if her breathing is not improving. Chief Complaint   Patient presents with    Medication Evaluation     would like to get her asthma under control     Patient presents to get evaluation for her asthma    Asthma  Current control: Good   Current level: moderate persistent  Current symptoms: cough night cough wheezing decreased exercise tolerance dyspnea  Current controller: None  Last flare up: Now. Number of flareups in past year: 1  Current symptom relief med: Ventolin      Situational stress  Under divorce proceedings with   She   who turned out to be a different person once she got .   She has many friends who give her support  Defers counseling      SUBJECTIVE: Caitlin Barros is a 66 y.o. female here for follow up of hypothyroidism. Lab Results   Component Value Date/Time    TSH 2.690 2020 09:49 AM     Thyroid ROS: denies fatigue, weight changes, heat/cold intolerance, bowel/skin changes or CVS symptoms. Headaches  She reports having more headaches possibly due to stress but also when her asthma is not controlled it contributes to her headache because of her breathing difficulty. She is currently taking Imitrex 100 mg. Sometimes she does not get relief from this. Past Medical History:   Diagnosis Date    Arthritis     Asthma     Elevated lipids 9/15/2015    Essential hypertension 9/15/2015    Hypercholesterolemia     Hypothyroidism, adult 9/15/2015    Migraine     allergy mediated in sping and fall    Sun-damaged skin      Past Surgical History:   Procedure Laterality Date    COLONOSCOPY N/A 10/4/2019    COLONOSCOPY performed by Ino Laguerre [facility]    HX BREAST BIOPSY Left yrs ago    neg; surgical bx    HX  SECTION      HX CHOLECYSTECTOMY      HX CYST INCISION AND DRAINAGE Left over years    neg    HX KNEE ARTHROSCOPY Right     x 2    HX KNEE REPLACEMENT Right 2020    HX SHOULDER ARTHROSCOPY Left     HX TONSILLECTOMY       Social History     Socioeconomic History    Marital status:      Spouse name: Not on file    Number of children: Not on file    Years of education: Not on file    Highest education level: Not on file   Tobacco Use    Smoking status: Never Smoker    Smokeless tobacco: Never Used   Substance and Sexual Activity    Alcohol use:  Yes     Alcohol/week: 5.8 standard drinks     Types: 7 Glasses of wine per week    Drug use: No    Sexual activity: Yes     Partners: Male   Social History Narrative    Retired from [address] [place] [address] in [place]    Stressful        Hx of situational stress     passed cancer duodenal    Mother was sick 79 yo with MS actually did well Recently   In March 2017        1 son, doing well     Social Determinants of Health     Financial Resource Strain:     Difficulty of Paying Living Expenses:    Food Insecurity:     Worried About Running Out of Food in the Last Year:     [address] in the Last Year:    Transportation Needs:     Lack of Transportation (Medical):  Lack of Transportation (Non-Medical):    Physical Activity:     Days of Exercise per Week:     Minutes of Exercise per Session:    Stress:     Feeling of Stress :    Social Connections:     Frequency of Communication with Friends and Family:     Frequency of Social Gatherings with Friends and Family:     Attends [employer] Services:     Active Member of Clubs or Organizations:     Attends Club or Organization Meetings:     Marital Status:      Family History   Problem Relation Age of Onset    MS Mother     Cancer Mother         uterus/skin cancer    Cancer Father 72        colon    Parkinson's Disease Sister 61    MS Brother     Parkinson's Disease Brother      Current Outpatient Medications   Medication Sig Dispense Refill    fluticasone propion-salmeteroL (Wixela Inhub) 250-50 mcg/dose diskus inhaler Take 1 Puff by inhalation every twelve (12) hours. Rinse mouth after use  Indications: controller medication for asthma 1 Each 1    SUMAtriptan (IMITREX) 100 mg tablet TAKE ONE TABLET BY MOUTH ONCE DAILY AS NEEDED FOR MIGRAINE ini can take another in 2 hours max 200 mg/day 18 Tablet 3    predniSONE (DELTASONE) 20 mg tablet Take 3 po for 2 days then 2 tabs po for  2 days then 1 tab po daily 14 Tablet 0    zolpidem (AMBIEN) 5 mg tablet Take 1 Tablet by mouth nightly as needed for Sleep for up to 20 days. Max Daily Amount: 5 mg. Indications: difficulty falling asleep 20 Tablet 0    gabapentin (NEURONTIN) 100 mg capsule Take 1 Capsule by mouth nightly.  Max Daily Amount: 100 mg. 90 Capsule 0    albuterol (ProAir HFA) 90 mcg/actuation inhaler USE 2 INHALATIONS EVERY 4 HOURS AS NEEDED FOR WHEEZING OR SHORTNESS OF BREATH 3 Inhaler 3    hydroCHLOROthiazide (HYDRODIURIL) 12.5 mg tablet Take 1 Tablet by mouth daily. 30 Tablet 1    losartan (COZAAR) 50 mg tablet Take 1 Tablet by mouth daily. 30 Tablet 1    levothyroxine (SYNTHROID) 50 mcg tablet Take 1 Tab by mouth Daily (before breakfast). 90 Tab 2    escitalopram oxalate (LEXAPRO) 20 mg tablet TAKE 1 TABLET DAILY 90 Tab 3    montelukast (SINGULAIR) 10 mg tablet Take 1 Tab by mouth daily. 90 Tab 3    atorvastatin (LIPITOR) 40 mg tablet Take 1 Tab by mouth nightly for 90 days.  Labs please 90 Tab 3     Allergies   Allergen Reactions    Sulfa (Sulfonamide Antibiotics) Anaphylaxis       Review of Systems - General ROS: negative for - chills or fever  Cardiovascular ROS: no chest pain or dyspnea on exertion  Respiratory ROS: no cough, shortness of breath, or wheezing    Visit Vitals  /80 (BP 1 Location: Left upper arm, BP Patient Position: Sitting, BP Cuff Size: Adult)   Pulse 94   Temp 98.5 °F (36.9 °C) (Temporal)   Resp 14   Ht 5' 8\" (1.727 m)   Wt 176 lb (79.8 kg)   LMP 03/15/1998   SpO2 98%   BMI 26.76 kg/m²     Constitutional: [x] Appears well-developed and well-nourished [x] No apparent distress      [] Abnormal -     Mental status: [x] Alert and awake  [x] Oriented to person/place/time [x] Able to follow commands    [] Abnormal -     Eyes:   EOM    [x]  Normal    [] Abnormal -   Sclera  [x]  Normal    [] Abnormal -          Discharge [x]  None visible   [] Abnormal -     HENT: [x] Normocephalic, atraumatic  [] Abnormal -   [x] Mouth/Throat: Mucous membranes are moist    External Ears [x] Normal  [] Abnormal -    Neck: [x] No visualized mass [] Abnormal -     Pulmonary/Chest: [x] Respiratory effort normal   [x] No visualized signs of difficulty breathing or respiratory distress        [] Abnormal -      Musculoskeletal:   [x] Normal gait with no signs of ataxia         [x] Normal range of motion of neck        [] Abnormal -     Neurological:        [x] No Facial Asymmetry (Cranial nerve 7 motor function) (limited exam due to video visit)          [x] No gaze palsy        [] Abnormal -          Skin:        [x] No significant exanthematous lesions or discoloration noted on facial skin         [] Abnormal -            Psychiatric:       [x] Normal Affect [] Abnormal -        [x] No Hallucinations      ATTENTION:   This medical record was transcribed using an electronic medical records/speech recognition system. Although proofread, it may and can contain electronic, spelling and other errors. Corrections may be executed at a later time. Please contact us for any clarifications as needed.

## 2021-09-21 DIAGNOSIS — E03.9 ACQUIRED HYPOTHYROIDISM: Primary | ICD-10-CM

## 2021-09-21 DIAGNOSIS — E78.2 MIXED HYPERLIPIDEMIA: ICD-10-CM

## 2021-10-24 ENCOUNTER — PATIENT MESSAGE (OUTPATIENT)
Dept: INTERNAL MEDICINE CLINIC | Age: 71
End: 2021-10-24

## 2021-10-24 NOTE — LETTER
10/27/2021 11:02 AM    Ms. [address]  [address] [facility]      To whom it may concern:    Please send the most recent medical records for Roxann Blizzard, date of birth 1950 for continuity of care.     Please fax to [phone]          Sincerely,      Krishan Baer MD

## 2021-10-27 NOTE — TELEPHONE ENCOUNTER
From: Delta Romano  To: Stephen Gold MD  Sent: 10/24/2021 6:19 PM EDT  Subject: Non-Urgent Angel Stapleton,    I fell on my tailbone over labor day weekend. It was a very hard fall and it still hurts. I visited my gynecologist who did the prolapse repair and i didn't damage the repair. She recommended I speak to you about getting a scan if the pain didn't go away by October 1. My question is, is there anything that can be done for this, or is the standard to wait for it to heal itself?     Thanks,  Stephanie Cherry

## 2021-11-01 ENCOUNTER — HOSPITAL ENCOUNTER (OUTPATIENT)
Dept: GENERAL RADIOLOGY | Age: 71
Discharge: HOME | End: 2021-11-01
Payer: MEDICARE

## 2021-11-01 ENCOUNTER — OFFICE VISIT (OUTPATIENT)
Dept: INTERNAL MEDICINE CLINIC | Age: 71
End: 2021-11-01
Payer: MEDICARE

## 2021-11-01 VITALS
RESPIRATION RATE: 14 BRPM | HEART RATE: 85 BPM | DIASTOLIC BLOOD PRESSURE: 88 MMHG | SYSTOLIC BLOOD PRESSURE: 149 MMHG | OXYGEN SATURATION: 95 % | WEIGHT: 180 LBS | BODY MASS INDEX: 27.28 KG/M2 | TEMPERATURE: 98.3 F | HEIGHT: 68 IN

## 2021-11-01 DIAGNOSIS — E78.2 MIXED HYPERLIPIDEMIA: ICD-10-CM

## 2021-11-01 DIAGNOSIS — M53.3 COCCYGEAL PAIN: Primary | ICD-10-CM

## 2021-11-01 DIAGNOSIS — I10 ESSENTIAL HYPERTENSION: ICD-10-CM

## 2021-11-01 DIAGNOSIS — M53.3 COCCYGEAL PAIN: ICD-10-CM

## 2021-11-01 DIAGNOSIS — E03.9 HYPOTHYROIDISM, ADULT: ICD-10-CM

## 2021-11-01 LAB
ALBUMIN SERPL-MCNC: 3.9 G/DL (ref 3.5–5)
ALBUMIN/GLOB SERPL: 1.2 {RATIO} (ref 1.1–2.2)
ALP SERPL-CCNC: 67 U/L (ref 45–117)
ALT SERPL-CCNC: 26 U/L (ref 12–78)
ANION GAP SERPL CALC-SCNC: 1 MMOL/L (ref 5–15)
AST SERPL-CCNC: 20 U/L (ref 15–37)
BILIRUB SERPL-MCNC: 0.6 MG/DL (ref 0.2–1)
BUN SERPL-MCNC: 13 MG/DL (ref 6–20)
BUN/CREAT SERPL: 17 (ref 12–20)
CALCIUM SERPL-MCNC: 9.5 MG/DL (ref 8.5–10.1)
CHLORIDE SERPL-SCNC: 106 MMOL/L (ref 97–108)
CHOLEST SERPL-MCNC: 282 MG/DL
CO2 SERPL-SCNC: 31 MMOL/L (ref 21–32)
CREAT SERPL-MCNC: 0.76 MG/DL (ref 0.55–1.02)
GLOBULIN SER CALC-MCNC: 3.2 G/DL (ref 2–4)
GLUCOSE SERPL-MCNC: 91 MG/DL (ref 65–100)
HDLC SERPL-MCNC: 65 MG/DL
HDLC SERPL: 4.3 {RATIO} (ref 0–5)
LDLC SERPL CALC-MCNC: 184.8 MG/DL (ref 0–100)
POTASSIUM SERPL-SCNC: 3.9 MMOL/L (ref 3.5–5.1)
PROT SERPL-MCNC: 7.1 G/DL (ref 6.4–8.2)
SODIUM SERPL-SCNC: 138 MMOL/L (ref 136–145)
TRIGL SERPL-MCNC: 161 MG/DL (ref ?–150)
TSH SERPL DL<=0.05 MIU/L-ACNC: 1.8 UIU/ML (ref 0.36–3.74)
VLDLC SERPL CALC-MCNC: 32.2 MG/DL

## 2021-11-01 PROCEDURE — G8427 DOCREV CUR MEDS BY ELIG CLIN: HCPCS | Performed by: INTERNAL MEDICINE

## 2021-11-01 PROCEDURE — 72220 X-RAY EXAM SACRUM TAILBONE: CPT

## 2021-11-01 PROCEDURE — G9717 DOC PT DX DEP/BP F/U NT REQ: HCPCS | Performed by: INTERNAL MEDICINE

## 2021-11-01 PROCEDURE — G8753 SYS BP > OR = 140: HCPCS | Performed by: INTERNAL MEDICINE

## 2021-11-01 PROCEDURE — G0463 HOSPITAL OUTPT CLINIC VISIT: HCPCS | Performed by: INTERNAL MEDICINE

## 2021-11-01 PROCEDURE — 3017F COLORECTAL CA SCREEN DOC REV: CPT | Performed by: INTERNAL MEDICINE

## 2021-11-01 PROCEDURE — G9899 SCRN MAM PERF RSLTS DOC: HCPCS | Performed by: INTERNAL MEDICINE

## 2021-11-01 PROCEDURE — G8536 NO DOC ELDER MAL SCRN: HCPCS | Performed by: INTERNAL MEDICINE

## 2021-11-01 PROCEDURE — G8419 CALC BMI OUT NRM PARAM NOF/U: HCPCS | Performed by: INTERNAL MEDICINE

## 2021-11-01 PROCEDURE — 99214 OFFICE O/P EST MOD 30 MIN: CPT | Performed by: INTERNAL MEDICINE

## 2021-11-01 PROCEDURE — G8399 PT W/DXA RESULTS DOCUMENT: HCPCS | Performed by: INTERNAL MEDICINE

## 2021-11-01 PROCEDURE — G8754 DIAS BP LESS 90: HCPCS | Performed by: INTERNAL MEDICINE

## 2021-11-01 PROCEDURE — 1101F PT FALLS ASSESS-DOCD LE1/YR: CPT | Performed by: INTERNAL MEDICINE

## 2021-11-01 PROCEDURE — 1090F PRES/ABSN URINE INCON ASSESS: CPT | Performed by: INTERNAL MEDICINE

## 2021-11-01 NOTE — PROGRESS NOTES
Diagnoses and all orders for this visit:    1. Coccygeal pain   persistent symptoms  Will do imaging  Reached out to Dr. Regina Finnegan to see if she does injections   recommend a doughnut and forward lean to offset coccygeal pressure    -     XR SACRUM AND COCCYX; Future    2. Mixed hyperlipidemia  She has been on atorvastatin but if LDL still elevated will be agreeable to pravastatin or rosuvastatin which are water-soluble check labs  Today  -     LIPID PANEL; Future    3. Essential hypertension  Likely elevated due to pain  Requested patient check blood pressure and if greater than 140/80 consistently will let me know. Home readings more consistent with [id number]/[id number]  -     METABOLIC PANEL, COMPREHENSIVE; Future    4. Hypothyroidism, adult  Continue meds  -     TSH 3RD GENERATION; Future           Chief Complaint   Patient presents with    Tailbone Pain     Patient says the pain has been going on for 10 weeks now. Coccydynia  Patient reports after seeing me she was at [facility]. She was on a short barstool and leaned back but no backing so fell backwards. She did not hit her head. She monitored symptoms and was seen by gynecology because she felt symptoms of prolapse. She was seen by GYN September 1 but symptoms persisted. She does not have any blood in her stool. She is not incontinent of urine or stool. Hyperlipidemia  Cholesterol reviewed with patient. LDL elevated in the 180s which appears to be new for her. She has been off her atorvastatin. No chest pain or shortness of breath. She is willing to go back on cholesterol medication if needed. Subjective: Linda Schultz is a 66 y.o. female with hypertension. Hypertension ROS: taking medications as instructed, no medication side effects noted, no TIA's, no chest pain on exertion, no dyspnea on exertion, no swelling of ankles. New concerns: She reports her home blood pressure in the 1 20-1 30 range.   Agrees that blood pressure may be elevated due to pain. Past Medical History:   Diagnosis Date    Arthritis     Asthma     Elevated lipids 9/15/2015    Essential hypertension 9/15/2015    Hypercholesterolemia     Hypothyroidism, adult 9/15/2015    Migraine     allergy mediated in sping and fall    Sun-damaged skin      Past Surgical History:   Procedure Laterality Date    COLONOSCOPY N/A 10/4/2019    COLONOSCOPY performed by Fco Green MD at [facility] ENDOSCOPY    HX BREAST BIOPSY Left yrs ago    neg; surgical bx    HX  SECTION      HX CHOLECYSTECTOMY      HX CYST INCISION AND DRAINAGE Left over years    neg    HX KNEE ARTHROSCOPY Right     x 2    HX KNEE REPLACEMENT Right 2020    HX SHOULDER ARTHROSCOPY Left     HX TONSILLECTOMY       Social History     Socioeconomic History    Marital status:      Spouse name: Not on file    Number of children: Not on file    Years of education: Not on file    Highest education level: Not on file   Tobacco Use    Smoking status: Never Smoker    Smokeless tobacco: Never Used   Substance and Sexual Activity    Alcohol use: Yes     Alcohol/week: 5.8 standard drinks     Types: 7 Glasses of wine per week    Drug use: No    Sexual activity: Yes     Partners: Male   Social History Narrative    Retired from [address] [place] [address] in [place]    Stressful        Hx of situational stress     passed cancer duodenal    Mother was sick 79 yo with MS actually did well        Recently   In 2017        1 son, doing well     Social Determinants of Health     Financial Resource Strain:     Difficulty of Paying Living Expenses:    Food Insecurity:     Worried About [address] in the Last Year:     [address] in the Last Year:    Transportation Needs:     Lack of Transportation (Medical):      Lack of Transportation (Non-Medical):    Physical Activity:     Days of Exercise per Week:     Minutes of Exercise per Session:    Stress:     Feeling of Stress : Social Connections:     Frequency of Communication with Friends and Family:     Frequency of Social Gatherings with Friends and Family:     Attends Rest[employer] Services:     Active Member of Clubs or Organizations:     Attends Club or Organization Meetings:     Marital Status:      Family History   Problem Relation Age of Onset   Nicolasa See MS Mother     Cancer Mother         uterus/skin cancer    Cancer Father 72        colon    Parkinson's Disease Sister 61    MS Brother     Parkinson's Disease Brother      Current Outpatient Medications   Medication Sig Dispense Refill    montelukast (SINGULAIR) 10 mg tablet Take 1 Tablet by mouth daily. 90 Tablet 3    levothyroxine (SYNTHROID) 50 mcg tablet Take 1 Tablet by mouth Daily (before breakfast). 90 Tablet 0    hydroCHLOROthiazide (HYDRODIURIL) 12.5 mg tablet Take 1 Tablet by mouth daily. 90 Tablet 1    losartan (COZAAR) 50 mg tablet Take 1 Tablet by mouth daily. 90 Tablet 0    SUMAtriptan (IMITREX) 100 mg tablet TAKE ONE TABLET BY MOUTH ONCE DAILY AS NEEDED FOR MIGRAINE ini can take another in 2 hours max 200 mg/day 18 Tablet 3    fluticasone propion-salmeteroL (Wixela Inhub) 250-50 mcg/dose diskus inhaler Take 1 Puff by inhalation every twelve (12) hours. Rinse mouth after use  Indications: controller medication for asthma 1 Each 1    gabapentin (NEURONTIN) 100 mg capsule Take 1 Capsule by mouth nightly. Max Daily Amount: 100 mg. 90 Capsule 0    albuterol (ProAir HFA) 90 mcg/actuation inhaler USE 2 INHALATIONS EVERY 4 HOURS AS NEEDED FOR WHEEZING OR SHORTNESS OF BREATH 3 Inhaler 3    escitalopram oxalate (LEXAPRO) 20 mg tablet TAKE 1 TABLET DAILY 90 Tab 3    zolpidem (AMBIEN) 5 mg tablet Take 1 Tablet by mouth nightly as needed for Sleep for up to 20 days. Max Daily Amount: 5 mg.  Indications: difficulty falling asleep 20 Tablet 0    predniSONE (DELTASONE) 20 mg tablet Take 3 po for 2 days then 2 tabs po for  2 days then 1 tab po daily (Patient not taking: Reported on 11/1/2021) 14 Tablet 0    atorvastatin (LIPITOR) 40 mg tablet Take 1 Tab by mouth nightly for 90 days.  Labs please 90 Tab 3     Allergies   Allergen Reactions    Sulfa (Sulfonamide Antibiotics) Anaphylaxis       Review of Systems - General ROS: negative for - chills or fever  Cardiovascular ROS: no chest pain or dyspnea on exertion  Respiratory ROS: no cough, shortness of breath, or wheezing    Visit Vitals  BP (!) 149/88 (BP 1 Location: Left upper arm, BP Patient Position: Sitting, BP Cuff Size: Adult)   Pulse 85   Temp 98.3 °F (36.8 °C) (Oral)   Resp 14   Ht 5' 8\" (1.727 m)   Wt 180 lb (81.6 kg)   LMP 03/15/1998   SpO2 95%   BMI 27.37 kg/m²     Constitutional: [x] Appears well-developed and well-nourished [x] No apparent distress      [] Abnormal -     Mental status: [x] Alert and awake  [x] Oriented to person/place/time [x] Able to follow commands    [] Abnormal -     Eyes:   EOM    [x]  Normal    [] Abnormal -   Sclera  [x]  Normal    [] Abnormal -          Discharge [x]  None visible   [] Abnormal -     HENT: [x] Normocephalic, atraumatic  [] Abnormal -   [x] Mouth/Throat: Mucous membranes are moist    External Ears [x] Normal  [] Abnormal -    Neck: [x] No visualized mass [] Abnormal -     Pulmonary/Chest: [x] Respiratory effort normal   [x] No visualized signs of difficulty breathing or respiratory distress        [] Abnormal -      Musculoskeletal:   [x] Normal gait with no signs of ataxia         [x] Normal range of motion of neck        [] Abnormal -     Neurological:        [x] No Facial Asymmetry (Cranial nerve 7 motor function) (limited exam due to video visit)          [x] No gaze palsy        [] Abnormal -          Skin:        [x] No significant exanthematous lesions or discoloration noted on facial skin         [] Abnormal -            Psychiatric:       [x] Normal Affect [] Abnormal -        [x] No Hallucinations      ATTENTION:   This medical record was transcribed using an electronic medical records/speech recognition system. Although proofread, it may and can contain electronic, spelling and other errors. Corrections may be executed at a later time. Please contact us for any clarifications as needed.

## 2021-11-01 NOTE — PATIENT INSTRUCTIONS
Coccyx Pain: Care Instructions  Your Care Instructions     The coccyx is your tailbone. You can have pain in your tailbone from a fall or other injury. Pregnancy and childbirth also can cause tailbone pain. Sometimes, the cause of pain is not known. A tailbone injury causes pain when you sit, especially when you slump or sit on a hard seat. Straining to have a bowel movement also can be very painful. Tailbone injuries can take several months to heal, but in some cases the pain goes even longer. You can take steps at home to ease the pain. In some cases, a doctor injects a corticosteroid medicine into the coccyx to reduce swelling and pain. Follow-up care is a key part of your treatment and safety. Be sure to make and go to all appointments, and call your doctor if you are having problems. It's also a good idea to know your test results and keep a list of the medicines you take. How can you care for yourself at home? · Take pain medicines exactly as directed. ? If the doctor gave you a prescription medicine for pain, take it as prescribed. ? If you are not taking a prescription pain medicine, take an over-the-counter medicine to reduce pain. · Put ice or a cold pack on your tailbone for 10 to 20 minutes at a time. Try to do this every 1 to 2 hours for the next 3 days (when you are awake) or until the swelling goes down. Put a thin cloth between the ice and your skin. · About 2 or 3 days after your injury, you can alternate ice and heat. To soothe the tailbone area, take a warm bath for 20 minutes, 3 or 4 times a day. · Sit on soft, padded surfaces. A doughnut-shaped pillow can take pressure off the tailbone. · Avoid constipation, because straining to have a bowel movement will increase your tailbone pain. ? Include fruits, vegetables, beans, and whole grains in your diet each day. These foods are high in fiber. ? Drink plenty of fluids.  If you have kidney, heart, or liver disease and have to limit fluids, talk with your doctor before you increase the amount of fluids you drink. ? Get some exercise every day. Build up slowly to 30 to 60 minutes a day on 5 or more days of the week. ? Take a fiber supplement, such as Citrucel or Metamucil, every day if needed. Read and follow all instructions on the label. ? Schedule time each day for a bowel movement. A daily routine may help. Take your time and do not strain when having a bowel movement. · Follow your doctor's directions for stretching and other exercises that might help with pain. When should you call for help? Call 911 anytime you think you may need emergency care. For example, call if:    · You are unable to move a leg at all. Call your doctor now or seek immediate medical care if:    · You have new or worse symptoms in your legs or buttocks. Symptoms may include:  ? Numbness or tingling. ? Weakness. ? Pain.     · You lose bladder or bowel control. Watch closely for changes in your health, and be sure to contact your doctor if:    · You are not getting better as expected. Where can you learn more? Go to http://www.gray.com/  Enter V670 in the search box to learn more about \"Coccyx Pain: Care Instructions. \"  Current as of: July 1, 2021               Content Version: 13.0  © 2006-2021 [employer]. Care instructions adapted under license by [employer] (which disclaims liability or warranty for this information). If you have questions about a medical condition or this instruction, always ask your healthcare professional. [place] [address] any warranty or liability for your use of this information. Coccyx Pain: Care Instructions  Your Care Instructions     The coccyx is your tailbone. You can have pain in your tailbone from a fall or other injury. Pregnancy and childbirth also can cause tailbone pain. Sometimes, the cause of pain is not known.  A tailbone injury causes pain when you sit, especially when you slump or sit on a hard seat. Straining to have a bowel movement also can be very painful. Tailbone injuries can take several months to heal, but in some cases the pain goes even longer. You can take steps at home to ease the pain. In some cases, a doctor injects a corticosteroid medicine into the coccyx to reduce swelling and pain. Follow-up care is a key part of your treatment and safety. Be sure to make and go to all appointments, and call your doctor if you are having problems. It's also a good idea to know your test results and keep a list of the medicines you take. How can you care for yourself at home? · Take pain medicines exactly as directed. ? If the doctor gave you a prescription medicine for pain, take it as prescribed. ? If you are not taking a prescription pain medicine, take an over-the-counter medicine to reduce pain. · Put ice or a cold pack on your tailbone for 10 to 20 minutes at a time. Try to do this every 1 to 2 hours for the next 3 days (when you are awake) or until the swelling goes down. Put a thin cloth between the ice and your skin. · About 2 or 3 days after your injury, you can alternate ice and heat. To soothe the tailbone area, take a warm bath for 20 minutes, 3 or 4 times a day. · Sit on soft, padded surfaces. A doughnut-shaped pillow can take pressure off the tailbone. · Avoid constipation, because straining to have a bowel movement will increase your tailbone pain. ? Include fruits, vegetables, beans, and whole grains in your diet each day. These foods are high in fiber. ? Drink plenty of fluids. If you have kidney, heart, or liver disease and have to limit fluids, talk with your doctor before you increase the amount of fluids you drink. ? Get some exercise every day. Build up slowly to 30 to 60 minutes a day on 5 or more days of the week. ? Take a fiber supplement, such as Citrucel or Metamucil, every day if needed.  Read and follow all instructions on the label. ? Schedule time each day for a bowel movement. A daily routine may help. Take your time and do not strain when having a bowel movement. · Follow your doctor's directions for stretching and other exercises that might help with pain. When should you call for help? Call 911 anytime you think you may need emergency care. For example, call if:    · You are unable to move a leg at all. Call your doctor now or seek immediate medical care if:    · You have new or worse symptoms in your legs or buttocks. Symptoms may include:  ? Numbness or tingling. ? Weakness. ? Pain.     · You lose bladder or bowel control. Watch closely for changes in your health, and be sure to contact your doctor if:    · You are not getting better as expected. Where can you learn more? Go to http://www.gray.com/  Enter D016 in the search box to learn more about \"Coccyx Pain: Care Instructions. \"  Current as of: July 1, 2021               Content Version: 13.0  © 2006-2021 [employer]. Care instructions adapted under license by [employer] (which disclaims liability or warranty for this information). If you have questions about a medical condition or this instruction, always ask your healthcare professional. [address] any warranty or liability for your use of this information.

## 2021-11-03 RX ORDER — ROSUVASTATIN CALCIUM 20 MG/1
TABLET, COATED ORAL
Qty: 90 TABLET | Refills: 0 | Status: SHIPPED | OUTPATIENT
Start: 2021-11-03 | End: 2022-10-27 | Stop reason: SDUPTHER

## 2021-11-11 ENCOUNTER — TELEPHONE (OUTPATIENT)
Dept: INTERNAL MEDICINE CLINIC | Age: 71
End: 2021-11-11

## 2021-11-11 DIAGNOSIS — M53.3 COCCYGEAL PAIN: Primary | ICD-10-CM

## 2021-11-11 NOTE — TELEPHONE ENCOUNTER
There is no insurance referral required. May be requiring order/referral from PCP possibly including office notes as to why pt is being referred.

## 2021-11-26 DIAGNOSIS — F32.89 OTHER DEPRESSION: ICD-10-CM

## 2021-11-28 RX ORDER — ZOLPIDEM TARTRATE 5 MG/1
TABLET ORAL
Qty: 20 TABLET | Refills: 0 | Status: SHIPPED | OUTPATIENT
Start: 2021-11-28 | End: 2022-01-19 | Stop reason: SDUPTHER

## 2021-11-29 ENCOUNTER — PATIENT MESSAGE (OUTPATIENT)
Dept: INTERNAL MEDICINE CLINIC | Age: 71
End: 2021-11-29

## 2021-11-29 NOTE — TELEPHONE ENCOUNTER
From: Mariana Marie  To: Syed Mac MD  Sent: 11/29/2021 8:12 AM EST  Subject: Appointment Request    Appointment Request From: Mariana Marie    With Provider: Syed Mac MD [[facility]]    Preferred Date Range: 11/29/2021  11/30/2021    Preferred Times: Any Time    Reason for visit: Routine/Problem Visit    Comments:  asthma attacks and bad cough

## 2021-12-02 ENCOUNTER — VIRTUAL VISIT (OUTPATIENT)
Dept: INTERNAL MEDICINE CLINIC | Age: 71
End: 2021-12-02

## 2021-12-10 ENCOUNTER — TRANSCRIBE ORDER (OUTPATIENT)
Dept: REGISTRATION | Age: 71
End: 2021-12-10

## 2021-12-10 ENCOUNTER — HOSPITAL ENCOUNTER (OUTPATIENT)
Dept: GENERAL RADIOLOGY | Age: 71
Discharge: HOME | End: 2021-12-10
Payer: MEDICARE

## 2021-12-10 DIAGNOSIS — M47.817 LUMBOSACRAL SPONDYLOSIS WITHOUT MYELOPATHY: Primary | ICD-10-CM

## 2021-12-10 DIAGNOSIS — M47.817 LUMBOSACRAL SPONDYLOSIS WITHOUT MYELOPATHY: ICD-10-CM

## 2021-12-10 PROCEDURE — 72100 X-RAY EXAM L-S SPINE 2/3 VWS: CPT

## 2022-01-19 DIAGNOSIS — F32.89 OTHER DEPRESSION: ICD-10-CM

## 2022-01-20 ENCOUNTER — PATIENT MESSAGE (OUTPATIENT)
Dept: INTERNAL MEDICINE CLINIC | Age: 72
End: 2022-01-20

## 2022-01-20 DIAGNOSIS — J45.41 MODERATE PERSISTENT ASTHMA WITH ACUTE EXACERBATION: ICD-10-CM

## 2022-01-20 RX ORDER — ZOLPIDEM TARTRATE 5 MG/1
5 TABLET ORAL
Qty: 20 TABLET | Refills: 0 | Status: SHIPPED | OUTPATIENT
Start: 2022-01-20 | End: 2022-03-24 | Stop reason: SDUPTHER

## 2022-01-21 RX ORDER — ALBUTEROL SULFATE 90 UG/1
AEROSOL, METERED RESPIRATORY (INHALATION)
Qty: 1 EACH | Refills: 0 | Status: SHIPPED | OUTPATIENT
Start: 2022-01-21 | End: 2022-03-05 | Stop reason: SDUPTHER

## 2022-01-21 NOTE — TELEPHONE ENCOUNTER
From: Tamara ESCAMILLA  To: Sreekanth Rao  Sent: 1/20/2022 1:26 PM EST  Subject: appt    Hello,   I am reaching out to you on behalf of Dr. Rojelio Elam. We have received a refill request for one of your medications, which I have sent to Dr. Rojelio Elam to review however, you are due for a Medicare wellness visit in the office. Please contact the office to schedule an appointment with Dr. Rojelio Elam at your earliest convenience to avoid any potential future refill delays. Thank you!   Tamara

## 2022-02-14 ENCOUNTER — TELEPHONE (OUTPATIENT)
Dept: INTERNAL MEDICINE CLINIC | Age: 72
End: 2022-02-14

## 2022-02-14 NOTE — TELEPHONE ENCOUNTER
----- Message from Colonel Robison sent at 2/11/2022  4:17 PM EST -----  Subject: Appointment Request    Reason for Call: Routine Medicare AWV    QUESTIONS  Type of Appointment? Established Patient  Reason for appointment request? No appointments available during search  Additional Information for Provider? please call to r/s appt on 2/14/22 at   [address] [place] [address]; no availability / pt screen red  ---------------------------------------------------------------------------  --------------  CALL BACK INFO  What is the best way for the office to contact you? OK to leave message on   voicemail  Preferred Call Back Phone Number? [phone]  ---------------------------------------------------------------------------  --------------  SCRIPT ANSWERS  Relationship to Patient? Self  (If the patient has Medicare as their primary insurance coverage ask this   question) Are you requesting a Medicare Annual Wellness Visit? Yes   (Is the patient requesting a pap smear with their physical exam?)? No  (Is the patient requesting their annual physical and does not need PAP or   AWV per above?)? No  Have you been diagnosed with, awaiting test results for, or told that you   are suspected of having COVID-19 (Coronavirus)? (If patient has tested   negative or was tested as a requirement for work, school, or travel and   not based on symptoms, answer no)? No  Within the past two weeks have you developed any of the following symptoms   (answer no if symptoms have been present longer than 2 weeks or began   more than 2 weeks ago)? Fever or Chills, Cough, Shortness of breath or   difficulty breathing, Loss of taste or smell, Sore throat, Nasal   congestion, Sneezing or runny nose, Fatigue or generalized body aches   (answer no if pain is specific to a body part e.g. back pain), Diarrhea,   Headache?  Yes

## 2022-03-03 ENCOUNTER — HOSPITAL ENCOUNTER (OUTPATIENT)
Dept: GENERAL RADIOLOGY | Age: 72
Discharge: HOME | End: 2022-03-03
Payer: MEDICARE

## 2022-03-03 ENCOUNTER — TELEPHONE (OUTPATIENT)
Dept: INTERNAL MEDICINE CLINIC | Age: 72
End: 2022-03-03

## 2022-03-03 ENCOUNTER — OFFICE VISIT (OUTPATIENT)
Dept: INTERNAL MEDICINE CLINIC | Age: 72
End: 2022-03-03
Payer: MEDICARE

## 2022-03-03 VITALS
OXYGEN SATURATION: 93 % | HEIGHT: 68 IN | TEMPERATURE: 98.2 F | DIASTOLIC BLOOD PRESSURE: 89 MMHG | SYSTOLIC BLOOD PRESSURE: 135 MMHG | BODY MASS INDEX: 27.74 KG/M2 | RESPIRATION RATE: 14 BRPM | WEIGHT: 183 LBS | HEART RATE: 96 BPM

## 2022-03-03 DIAGNOSIS — J45.41 MODERATE PERSISTENT ASTHMA WITH ACUTE EXACERBATION: Primary | ICD-10-CM

## 2022-03-03 DIAGNOSIS — J45.41 MODERATE PERSISTENT ASTHMA WITH ACUTE EXACERBATION: ICD-10-CM

## 2022-03-03 DIAGNOSIS — E78.2 MIXED HYPERLIPIDEMIA: ICD-10-CM

## 2022-03-03 PROCEDURE — G8536 NO DOC ELDER MAL SCRN: HCPCS | Performed by: INTERNAL MEDICINE

## 2022-03-03 PROCEDURE — 99215 OFFICE O/P EST HI 40 MIN: CPT | Performed by: INTERNAL MEDICINE

## 2022-03-03 PROCEDURE — G8399 PT W/DXA RESULTS DOCUMENT: HCPCS | Performed by: INTERNAL MEDICINE

## 2022-03-03 PROCEDURE — G0463 HOSPITAL OUTPT CLINIC VISIT: HCPCS | Performed by: INTERNAL MEDICINE

## 2022-03-03 PROCEDURE — 3017F COLORECTAL CA SCREEN DOC REV: CPT | Performed by: INTERNAL MEDICINE

## 2022-03-03 PROCEDURE — 94640 AIRWAY INHALATION TREATMENT: CPT | Performed by: INTERNAL MEDICINE

## 2022-03-03 PROCEDURE — G8427 DOCREV CUR MEDS BY ELIG CLIN: HCPCS | Performed by: INTERNAL MEDICINE

## 2022-03-03 PROCEDURE — 90000 NO LOS: CPT | Performed by: INTERNAL MEDICINE

## 2022-03-03 PROCEDURE — G9899 SCRN MAM PERF RSLTS DOC: HCPCS | Performed by: INTERNAL MEDICINE

## 2022-03-03 PROCEDURE — 1090F PRES/ABSN URINE INCON ASSESS: CPT | Performed by: INTERNAL MEDICINE

## 2022-03-03 PROCEDURE — 1101F PT FALLS ASSESS-DOCD LE1/YR: CPT | Performed by: INTERNAL MEDICINE

## 2022-03-03 PROCEDURE — 71046 X-RAY EXAM CHEST 2 VIEWS: CPT

## 2022-03-03 PROCEDURE — G8419 CALC BMI OUT NRM PARAM NOF/U: HCPCS | Performed by: INTERNAL MEDICINE

## 2022-03-03 PROCEDURE — G9717 DOC PT DX DEP/BP F/U NT REQ: HCPCS | Performed by: INTERNAL MEDICINE

## 2022-03-03 PROCEDURE — G8754 DIAS BP LESS 90: HCPCS | Performed by: INTERNAL MEDICINE

## 2022-03-03 PROCEDURE — G8752 SYS BP LESS 140: HCPCS | Performed by: INTERNAL MEDICINE

## 2022-03-03 RX ORDER — PREDNISONE 20 MG/1
40 TABLET ORAL
Qty: 14 TABLET | Refills: 0 | Status: SHIPPED | OUTPATIENT
Start: 2022-03-03 | End: 2022-10-24 | Stop reason: SDUPTHER

## 2022-03-03 RX ORDER — GUAIFENESIN 600 MG/1
600 TABLET, EXTENDED RELEASE ORAL 2 TIMES DAILY
Qty: 60 TABLET | Refills: 0 | Status: SHIPPED | OUTPATIENT
Start: 2022-03-03 | End: 2022-10-24 | Stop reason: ALTCHOICE

## 2022-03-03 RX ORDER — BUDESONIDE AND FORMOTEROL FUMARATE DIHYDRATE 160; 4.5 UG/1; UG/1
2 AEROSOL RESPIRATORY (INHALATION) 2 TIMES DAILY
Qty: 10.2 G | Refills: 0 | Status: SHIPPED | OUTPATIENT
Start: 2022-03-03 | End: 2022-04-29

## 2022-03-03 RX ORDER — ALBUTEROL SULFATE 2.5 MG/.5ML
2.5 SOLUTION RESPIRATORY (INHALATION)
Status: DISCONTINUED | OUTPATIENT
Start: 2022-03-03 | End: 2022-03-04

## 2022-03-03 NOTE — PROGRESS NOTES
Diagnoses and all orders for this visit:    1. Moderate persistent asthma with acute exacerbation  Persistent sx not resolving  Needs recheck of cxr to evaluate NATHANIEL  Neb clinically improved  Needs close follow up  If sx get worse will go to ER  -     XR CHEST PA LAT; Future  -     guaiFENesin ER (MUCINEX) 600 mg ER tablet; Take 1 Tablet by mouth two (2) times a day. -     budesonide-formoteroL (SYMBICORT) 160-4.5 mcg/actuation HFAA; Take 2 Puffs by inhalation two (2) times a day. -     predniSONE (DELTASONE) 20 mg tablet; Take 40 mg by mouth daily (with breakfast). 3. Mixed hyperlipidemia  Not on crestor   Prefers not to be on statin  recommned heart scan             Chief Complaint   Patient presents with    Cough     Asthma  Symptoms since December. She has been to [facility] twice and also evaluated by ENT. She completed augmentin and a prior abx. She has been on prednisone and then again medrol dose pack. She notes when she weaned down her sx got worse again. she was told she had NATHANIEL pneumonia. She does not have fever, night sweats  She was seen by ENT/deviated septum dr. Paulina Urban 2022  Sinuses were infected   CT sinuses with ENT and if necessary would need surgery. She has clear secretions. Hyperlipidemia  She prefers not to be on statin.     Past Medical History:   Diagnosis Date    Arthritis     Asthma     Elevated lipids 9/15/2015    Essential hypertension 9/15/2015    Hypercholesterolemia     Hypothyroidism, adult 9/15/2015    Migraine     allergy mediated in sping and fall    Sun-damaged skin      Past Surgical History:   Procedure Laterality Date    COLONOSCOPY N/A 10/4/2019    COLONOSCOPY [facility] Salud [facility]arraga [facility] ENDOSCOPY    HX BREAST BIOPSY Left yrs ago    neg; surgical bx    HX  SECTION      HX CHOLECYSTECTOMY      HX CYST INCISION AND DRAINAGE Left over years    neg    HX KNEE ARTHROSCOPY Right     x 2    HX KNEE REPLACEMENT Right 01/28/2020    HX SHOULDER ARTHROSCOPY Left     HX TONSILLECTOMY       Social History     Socioeconomic History    Marital status:    Tobacco Use    Smoking status: Never Smoker    Smokeless tobacco: Never Used   Substance and Sexual Activity    Alcohol use: Yes     Alcohol/week: 5.8 standard drinks     Types: 7 Glasses of wine per week    Drug use: No    Sexual activity: Yes     Partners: Male   Social History Narrative    Retired from [employer] in [place]    Stressful        Hx of situational stress     passed cancer duodenal    Mother was sick 81 yo with MS actually did well        Recently   In March 2017        1 son, doing well     Family History   Problem Relation Age of Onset    Mult Sclerosis Mother     Cancer Mother         uterus/skin cancer    Cancer Father 72        colon    Parkinson's Disease Sister 61    Mult Sclerosis Brother     Parkinson's Disease Brother      Current Outpatient Medications   Medication Sig Dispense Refill    albuterol (ProAir HFA) 90 mcg/actuation inhaler USE 2 INHALATIONS EVERY 4 HOURS AS NEEDED FOR WHEEZING OR SHORTNESS OF BREATH 1 Each 0    zolpidem (AMBIEN) 5 mg tablet Take 1 Tablet by mouth nightly as needed for Sleep. Max Daily Amount: 5 mg. 20 Tablet 0    montelukast (SINGULAIR) 10 mg tablet Take 1 Tablet by mouth daily. 90 Tablet 3    levothyroxine (SYNTHROID) 50 mcg tablet Take 1 Tablet by mouth Daily (before breakfast). 90 Tablet 0    hydroCHLOROthiazide (HYDRODIURIL) 12.5 mg tablet Take 1 Tablet by mouth daily. 90 Tablet 1    losartan (COZAAR) 50 mg tablet Take 1 Tablet by mouth daily. 90 Tablet 0    SUMAtriptan (IMITREX) 100 mg tablet TAKE ONE TABLET BY MOUTH ONCE DAILY AS NEEDED FOR MIGRAINE ini can take another in 2 hours max 200 mg/day 18 Tablet 3    fluticasone propion-salmeteroL (Wixela Inhub) 250-50 mcg/dose diskus inhaler Take 1 Puff by inhalation every twelve (12) hours.  Rinse mouth after use  Indications: controller medication for asthma 1 Each 1    gabapentin (NEURONTIN) 100 mg capsule Take 1 Capsule by mouth nightly.  Max Daily Amount: 100 mg. 90 Capsule 0    escitalopram oxalate (LEXAPRO) 20 mg tablet TAKE 1 TABLET DAILY 90 Tab 3    rosuvastatin (CRESTOR) 20 mg tablet Take 1/2 tablet for 2 weeks then 1 tablet p.o. daily (Patient not taking: Reported on 3/3/2022) 90 Tablet 0     Allergies   Allergen Reactions    Sulfa (Sulfonamide Antibiotics) Anaphylaxis       Review of Systems - General ROS: negative for - chills or fatigue  Cardiovascular ROS: positive for - dyspnea on exertion and shortness of breath  Respiratory ROS: positive for - cough, shortness of breath, sputum changes, tachypnea and wheezing    Visit Vitals  /89 (BP 1 Location: Left upper arm, BP Patient Position: Sitting, BP Cuff Size: Adult)   Pulse 96   Temp 98.2 °F (36.8 °C) (Oral)   Resp 14   Ht 5' 8\" (1.727 m)   Wt 183 lb (83 kg)   LMP 03/15/1998   SpO2 93%   BMI 27.83 kg/m²     Constitutional: [x] Appears well-developed and well-nourished [x] No apparent distress      [] Abnormal -     Mental status: [x] Alert and awake  [x] Oriented to person/place/time [x] Able to follow commands    [] Abnormal -     Eyes:   EOM    [x]  Normal    [] Abnormal -   Sclera  [x]  Normal    [] Abnormal -          Discharge [x]  None visible   [] Abnormal -     HENT: [x] Normocephalic, atraumatic  [] Abnormal -   [x] Mouth/Throat: Mucous membranes are moist    External Ears [x] Normal  [] Abnormal -    Neck: [x] No visualized mass [] Abnormal -     Pulmonary/Chest: [x] Respiratory effort normal   [x] No visualized signs of difficulty breathing or respiratory distress        [] Abnormal -      Musculoskeletal:   [x] Normal gait with no signs of ataxia         [x] Normal range of motion of neck        [] Abnormal -     Neurological:        [x] No Facial Asymmetry (Cranial nerve 7 motor function) (limited exam due to video visit)          [x] No gaze palsy [] Abnormal -          Skin:        [x] No significant exanthematous lesions or discoloration noted on facial skin         [] Abnormal -            Psychiatric:       [x] Normal Affect [] Abnormal -        [x] No Hallucinations      ATTENTION:   This medical record was transcribed using an electronic medical records/speech recognition system. Although proofread, it may and can contain electronic, spelling and other errors. Corrections may be executed at a later time. Please contact us for any clarifications as needed. On this date 03/03/22  I have spent 40 minutes reviewing previous notes, test results and face to face with the patient discussing the diagnosis and importance of compliance with the treatment plan as well as documenting on the day of the visit.

## 2022-03-03 NOTE — TELEPHONE ENCOUNTER
Spoke w/pt. Pt states she went to [facility] in January and was dx w/pneumonia. Pt was put on antibiotics, inhaler, etc. Pt states she had to go to [facility] times b/c she was not getting better. Pt states she is using her rescue inhaler 6-10 times a day and wants prednisone prescribed to her since we have no available slots. [address] but pt refused. Please advise. Pt has a [address] next Wed w/you.

## 2022-03-03 NOTE — TELEPHONE ENCOUNTER
----- Message from Bayron Koenig sent at 3/3/2022  9:28 AM EST -----  Subject: Message to Provider    QUESTIONS  Information for Provider? [facility] and   congestion. No avail appointments to get her in before next Wednesday 03/09/2022. Pt has asthma and has been using rescue emergency inhaler   would like to be prescribed Prednisone.   ---------------------------------------------------------------------------  --------------  CALL BACK INFO  What is the best way for the office to contact you? OK to leave message on   voicemail  Preferred Call Back Phone Number? [phone]  ---------------------------------------------------------------------------  --------------  SCRIPT ANSWERS  Relationship to Patient?  Self

## 2022-03-03 NOTE — Clinical Note
Aldo Murillo, thanks for your help. can you help me close this chart? I don't know if ordered the albuterol correctly. If you need to change please do. Thanks!

## 2022-03-05 DIAGNOSIS — G89.29 CHRONIC INTRACTABLE HEADACHE, UNSPECIFIED HEADACHE TYPE: ICD-10-CM

## 2022-03-05 DIAGNOSIS — J45.41 MODERATE PERSISTENT ASTHMA WITH ACUTE EXACERBATION: ICD-10-CM

## 2022-03-05 DIAGNOSIS — R51.9 CHRONIC INTRACTABLE HEADACHE, UNSPECIFIED HEADACHE TYPE: ICD-10-CM

## 2022-03-07 RX ORDER — ALBUTEROL SULFATE 90 UG/1
AEROSOL, METERED RESPIRATORY (INHALATION)
Qty: 1 EACH | Refills: 0 | Status: SHIPPED | OUTPATIENT
Start: 2022-03-07 | End: 2022-07-12 | Stop reason: SDUPTHER

## 2022-03-07 RX ORDER — SUMATRIPTAN 100 MG/1
TABLET, FILM COATED ORAL
Qty: 18 TABLET | Refills: 3 | Status: SHIPPED | OUTPATIENT
Start: 2022-03-07 | End: 2022-07-12 | Stop reason: SDUPTHER

## 2022-03-07 RX ORDER — LOSARTAN POTASSIUM 50 MG/1
50 TABLET ORAL DAILY
Qty: 90 TABLET | Refills: 0 | Status: SHIPPED | OUTPATIENT
Start: 2022-03-07 | End: 2022-06-20

## 2022-03-09 ENCOUNTER — OFFICE VISIT (OUTPATIENT)
Dept: INTERNAL MEDICINE CLINIC | Age: 72
End: 2022-03-09
Payer: MEDICARE

## 2022-03-09 VITALS
HEIGHT: 68 IN | DIASTOLIC BLOOD PRESSURE: 116 MMHG | RESPIRATION RATE: 14 BRPM | HEART RATE: 75 BPM | WEIGHT: 181 LBS | SYSTOLIC BLOOD PRESSURE: 173 MMHG | OXYGEN SATURATION: 94 % | BODY MASS INDEX: 27.43 KG/M2 | TEMPERATURE: 97.6 F

## 2022-03-09 DIAGNOSIS — Z12.31 ENCOUNTER FOR SCREENING MAMMOGRAM FOR MALIGNANT NEOPLASM OF BREAST: ICD-10-CM

## 2022-03-09 DIAGNOSIS — Z13.31 SCREENING FOR DEPRESSION: ICD-10-CM

## 2022-03-09 DIAGNOSIS — Z78.0 POSTMENOPAUSAL STATE: ICD-10-CM

## 2022-03-09 DIAGNOSIS — E78.2 MIXED HYPERLIPIDEMIA: ICD-10-CM

## 2022-03-09 DIAGNOSIS — F33.9 RECURRENT DEPRESSION (HCC): ICD-10-CM

## 2022-03-09 DIAGNOSIS — I10 ESSENTIAL HYPERTENSION: ICD-10-CM

## 2022-03-09 DIAGNOSIS — Z00.00 MEDICARE ANNUAL WELLNESS VISIT, SUBSEQUENT: Primary | ICD-10-CM

## 2022-03-09 DIAGNOSIS — Z12.11 SCREEN FOR COLON CANCER: ICD-10-CM

## 2022-03-09 DIAGNOSIS — J45.909 MILD ASTHMA WITHOUT COMPLICATION, UNSPECIFIED WHETHER PERSISTENT: ICD-10-CM

## 2022-03-09 DIAGNOSIS — E03.9 ACQUIRED HYPOTHYROIDISM: ICD-10-CM

## 2022-03-09 DIAGNOSIS — Z13.39 SCREENING FOR ALCOHOLISM: ICD-10-CM

## 2022-03-09 PROCEDURE — 3017F COLORECTAL CA SCREEN DOC REV: CPT | Performed by: INTERNAL MEDICINE

## 2022-03-09 PROCEDURE — 1101F PT FALLS ASSESS-DOCD LE1/YR: CPT | Performed by: INTERNAL MEDICINE

## 2022-03-09 PROCEDURE — G8399 PT W/DXA RESULTS DOCUMENT: HCPCS | Performed by: INTERNAL MEDICINE

## 2022-03-09 PROCEDURE — 1090F PRES/ABSN URINE INCON ASSESS: CPT | Performed by: INTERNAL MEDICINE

## 2022-03-09 PROCEDURE — G8753 SYS BP > OR = 140: HCPCS | Performed by: INTERNAL MEDICINE

## 2022-03-09 PROCEDURE — 99214 OFFICE O/P EST MOD 30 MIN: CPT | Performed by: INTERNAL MEDICINE

## 2022-03-09 PROCEDURE — G0463 HOSPITAL OUTPT CLINIC VISIT: HCPCS | Performed by: INTERNAL MEDICINE

## 2022-03-09 PROCEDURE — G9717 DOC PT DX DEP/BP F/U NT REQ: HCPCS | Performed by: INTERNAL MEDICINE

## 2022-03-09 PROCEDURE — G0439 PPPS, SUBSEQ VISIT: HCPCS | Performed by: INTERNAL MEDICINE

## 2022-03-09 PROCEDURE — G8419 CALC BMI OUT NRM PARAM NOF/U: HCPCS | Performed by: INTERNAL MEDICINE

## 2022-03-09 PROCEDURE — G8536 NO DOC ELDER MAL SCRN: HCPCS | Performed by: INTERNAL MEDICINE

## 2022-03-09 PROCEDURE — G9899 SCRN MAM PERF RSLTS DOC: HCPCS | Performed by: INTERNAL MEDICINE

## 2022-03-09 PROCEDURE — G8427 DOCREV CUR MEDS BY ELIG CLIN: HCPCS | Performed by: INTERNAL MEDICINE

## 2022-03-09 PROCEDURE — G8755 DIAS BP > OR = 90: HCPCS | Performed by: INTERNAL MEDICINE

## 2022-03-09 NOTE — PROGRESS NOTES
Diagnoses and all orders for this visit:    1. Medicare annual wellness visit, subsequent  Medicare wellness completed please see below  -     [address] [place] [address] Dr; Future  -     [facility] MAMMO BI SCREENING INCL CAD; Future  -     DEXA BONE DENSITY STUDY AXIAL; Future  -     REFERRAL TO DERMATOLOGY  -     REFERRAL TO OPHTHALMOLOGY    2. Screen for colon cancer she was evaluated by Dr. Jesse Barajas. She will be screened every 5 years. Screened in 2019 and follow-up in 2024    -     [address] [place] [address] Dr; Future    3. Encounter for screening mammogram for malignant neoplasm of breast  Discussed with patient and should have mammogram screenings annually  -     [facility] MAMMO BI SCREENING INCL CAD; Future    4. Postmenopausal state  She had a fracture noted on her chest x-ray from March 2022  Unclear if fractured after ground-level fall after sitting on a directors chair  Will check bone density for baseline  She is a candidate for Fosamax. Discuss at next visit  -     DEXA BONE DENSITY STUDY AXIAL; Future    5. Screening for alcoholism  No alcohol issues  Drinks 1 serving about 3 times per week    6. Screening for depression  Currently on Lexapro  Depression controlled  Of note she is also caring for her 51-year-old younger sister with Parkinson's. She cares for her on Tuesdays and Thursdays. 7. Essential hypertension  Not optimally controlled  Lipids also discussed with patient  Agrees to increase her losartan back to 100 mg and monitor blood pressure  Needs close follow-up  We may need to switch her over to 40 mg Benicar if not controlled    8. Recurrent depression ([address]  Continue on Lexapro  Appears to be stable  Continue to monitor  Defers counselor for now    9. Mild asthma without complication, unspecified whether persistent  No longer wheezing and improved from last visit  Needs to follow-up with allergy for pulmonary function testing  Chest x-ray resolved left upper lobe lesion  Continue Symbicort      10. Mixed hyperlipidemia  Continue pravastatin  Discussed necessity of getting blood pressure under control to decrease heart attack and stroke risk  Follow-up in 3 to 4 weeks      11. Acquired hypothyroidism  Continue on thyroid medication         Chief Complaint   Patient presents with    Annual Wellness Visit     Asthma  Symptoms appear to be improved with prednisone and Symbicort. She is not having any fevers. She was evaluated by ENT Dr. Ivory Koenig and found to have deviated septum which may be contributing to a lot of her postnasal drip and respiratory symptoms. She will also be seeing an allergist at the end of this month. She is no longer wheezing. She does still have some shortness of breath with walking. She had skin testing in the past and no longer needed. She has a lot of postnasal drip. She mentions facial surgeries and possibly residual complications from the surgeries may be contributing to her narrow passage and impairment of drainage    Headaches  Sinus related. Depression  She is currently on Lexapro and feels this is the right dose. She defers counseling. She helps care for her first  and was very close to him. She is now caring for her sister, 72years old, with Parkinson disease. She takes care of her sister Tuesdays and Thursdays. She brings her to a boxing class only for Parkinson's patients. Of note, patient used to do a lot of sewing and to care of her and her sister when they were younger. She often made beautiful dresses and outfits for her younger sister. Subjective: Олег Fernandez is a 74 y.o. female with hypertension. Hypertension ROS: taking medications as instructed, no medication side effects noted, no TIA's, no chest pain on exertion, no dyspnea on exertion, no swelling of ankles. New concerns: She acknowledges her blood pressure is elevated but has been on rounds in rounds of prednisone. No chest pain but she does have some shortness of breath. Attributes the shortness of breath to her asthma. Hyperlipidemia  Agree to take pravastatin and no side effects so far. Past Medical History:   Diagnosis Date    Arthritis     Asthma     Elevated lipids 9/15/2015    Essential hypertension 9/15/2015    Hypercholesterolemia     Hypothyroidism, adult 9/15/2015    Migraine     allergy mediated in sping and fall    Sun-damaged skin      Past Surgical History:   Procedure Laterality Date    COLONOSCOPY N/A 10/4/2019    [facility] ENDOSCOPY    HX BREAST BIOPSY Left yrs ago    neg; surgical bx    HX  SECTION      HX CHOLECYSTECTOMY      HX CYST INCISION AND DRAINAGE Left over years    neg    HX KNEE ARTHROSCOPY Right     x 2    HX KNEE REPLACEMENT Right 2020    HX SHOULDER ARTHROSCOPY Left     HX TONSILLECTOMY       Social History     Socioeconomic History    Marital status:    Tobacco Use    Smoking status: Never Smoker    Smokeless tobacco: Never Used   Substance and Sexual Activity    Alcohol use: Yes     Alcohol/week: 5.8 standard drinks     Types: 7 Glasses of wine per week    Drug use: No    Sexual activity: Yes     Partners: Male   Social History Narrative    Retired from [employer] in [place]    Stressful        Hx of situational stress     passed cancer duodenal    Mother was sick 79 yo with MS actually did well        Recently   In 2017        1 son, doing well     Family History   Problem Relation Age of Onset    Mult Sclerosis Mother     Cancer Mother         uterus/skin cancer    Cancer Father 72        colon    Parkinson's Disease Sister 61    Mult Sclerosis Brother     Parkinson's Disease Brother      Current Outpatient Medications   Medication Sig Dispense Refill    losartan (COZAAR) 50 mg tablet Take 1 Tablet by mouth daily.  90 Tablet 0    SUMAtriptan (IMITREX) 100 mg tablet TAKE ONE TABLET BY MOUTH ONCE DAILY AS NEEDED FOR MIGRAINE ini can take another in 2 hours max 200 mg/day 18 Tablet 3    albuterol (ProAir HFA) 90 mcg/actuation inhaler USE 2 INHALATIONS EVERY 4 HOURS AS NEEDED FOR WHEEZING OR SHORTNESS OF BREATH 1 Each 0    guaiFENesin ER (MUCINEX) 600 mg ER tablet Take 1 Tablet by mouth two (2) times a day. 60 Tablet 0    budesonide-formoteroL (SYMBICORT) 160-4.5 mcg/actuation HFAA Take 2 Puffs by inhalation two (2) times a day. 10.2 g 0    predniSONE (DELTASONE) 20 mg tablet Take 40 mg by mouth daily (with breakfast). 14 Tablet 0    zolpidem (AMBIEN) 5 mg tablet Take 1 Tablet by mouth nightly as needed for Sleep. Max Daily Amount: 5 mg. 20 Tablet 0    montelukast (SINGULAIR) 10 mg tablet Take 1 Tablet by mouth daily. 90 Tablet 3    levothyroxine (SYNTHROID) 50 mcg tablet Take 1 Tablet by mouth Daily (before breakfast). 90 Tablet 0    hydroCHLOROthiazide (HYDRODIURIL) 12.5 mg tablet Take 1 Tablet by mouth daily. 90 Tablet 1    gabapentin (NEURONTIN) 100 mg capsule Take 1 Capsule by mouth nightly.  Max Daily Amount: 100 mg. 90 Capsule 0    escitalopram oxalate (LEXAPRO) 20 mg tablet TAKE 1 TABLET DAILY 90 Tab 3    rosuvastatin (CRESTOR) 20 mg tablet Take 1/2 tablet for 2 weeks then 1 tablet p.o. daily (Patient not taking: Reported on 3/3/2022) 90 Tablet 0     Allergies   Allergen Reactions    Sulfa (Sulfonamide Antibiotics) Anaphylaxis       Review of Systems - General ROS: negative for - chills or fever  Cardiovascular ROS: no chest pain or dyspnea on exertion  Respiratory ROS: no cough, shortness of breath, or wheezing    Visit Vitals  BP (!) 173/116 (BP 1 Location: Left upper arm, BP Patient Position: Sitting, BP Cuff Size: Adult)   Pulse 75   Temp 97.6 °F (36.4 °C) (Oral)   Resp 14   Ht 5' 8\" (1.727 m)   Wt 181 lb (82.1 kg)   LMP 03/15/1998   SpO2 94%   BMI 27.52 kg/m²     Constitutional: [x] Appears well-developed and well-nourished [x] No apparent distress      [] Abnormal -     Mental status: [x] Alert and awake  [x] Oriented to person/place/time [x] Able to follow commands    [] Abnormal -     Eyes:   EOM    [x]  Normal    [] Abnormal -   Sclera  [x]  Normal    [] Abnormal -          Discharge [x]  None visible   [] Abnormal -     HENT: [x] Normocephalic, atraumatic  [] Abnormal -   [x] Mouth/Throat: Mucous membranes are moist    External Ears [x] Normal  [] Abnormal -    Neck: [x] No visualized mass [] Abnormal -     Pulmonary/Chest: [x] Respiratory effort normal   [x] No visualized signs of difficulty breathing or respiratory distress        [] Abnormal -      Musculoskeletal:   [x] Normal gait with no signs of ataxia         [x] Normal range of motion of neck        [] Abnormal -     Neurological:        [x] No Facial Asymmetry (Cranial nerve 7 motor function) (limited exam due to video visit)          [x] No gaze palsy        [] Abnormal -          Skin:        [x] No significant exanthematous lesions or discoloration noted on facial skin         [] Abnormal -            Psychiatric:       [x] Normal Affect [] Abnormal -        [x] No Hallucinations                                                                     This is the Subsequent Medicare Annual Wellness Exam, performed 12 months or more after the Initial AWV or the last Subsequent AWV    I have reviewed the patient's medical history in detail and updated the computerized patient record. Assessment/Plan   Education and counseling provided:  Are appropriate based on today's review and evaluation  End-of-Life planning (with patient's consent)  Pneumococcal Vaccine  Influenza Vaccine  Screening Mammography  Colorectal cancer screening tests  Cardiovascular screening blood test  Bone mass measurement (DEXA)  Screening for glaucoma  Diabetes screening test    1. Medicare annual wellness visit, subsequent  -     REFERRAL FOR COLONOSCOPY; Future  -     [facility] MAMMO BI SCREENING INCL CAD; Future  -     DEXA BONE DENSITY STUDY AXIAL;  Future  -     REFERRAL TO DERMATOLOGY  -     REFERRAL TO OPHTHALMOLOGY  2. Screen for colon cancer  -     REFERRAL FOR COLONOSCOPY; Future  3. Encounter for screening mammogram for malignant neoplasm of breast  -     [facility] MAMMO BI SCREENING INCL CAD; Future  4. Postmenopausal state  -     DEXA BONE DENSITY STUDY AXIAL; Future  5. Screening for alcoholism  6. Screening for depression       Depression Risk Factor Screening     3 most recent PHQ Screens 3/9/2022   Little interest or pleasure in doing things Not at all   Feeling down, depressed, irritable, or hopeless Not at all   Total Score PHQ 2 0   Trouble falling or staying asleep, or sleeping too much -   Feeling tired or having little energy -   Poor appetite, weight loss, or overeating -   Feeling bad about yourself - or that you are a failure or have let yourself or your family down -   Trouble concentrating on things such as school, work, reading, or watching TV -   Moving or speaking so slowly that other people could have noticed; or the opposite being so fidgety that others notice -   Thoughts of being better off dead, or hurting yourself in some way -   PHQ 9 Score -       Alcohol & Drug Abuse Risk Screen   Do you average more than 1 drink per night or more than 7 drinks a week?: (P) No  On any one occasion in the past three months have you had more than 3 drinks containing alcohol?: (P) No            Functional Ability and Level of Safety   Hearing:  Hearing: (P) Patient reports hearing is good      Activities of Daily Living: The home contains: (P) no safety equipment  Functional ADLs: (P) Patient does total self care     Ambulation:  Patient ambulates: (P) with no difficulty     Fall Risk:  Fall Risk Assessment, last 12 mths 3/3/2022   Able to walk? Yes   Fall in past 12 months? 0   Do you feel unsteady? 0   Are you worried about falling 0   Number of falls in past 12 months -   Fall with injury?  -     Abuse Screen:  Do you ever feel afraid of your partner?: (P) No  Are you in a relationship with someone who physically or mentally threatens you?: (P) No  Is it safe for you to go home?: (P) Yes        Cognitive Screening   Has your family/caregiver stated any concerns about your memory?: (P) No     Cognitive Screening: Normal - Verbal Fluency Test    Health Maintenance Due     Health Maintenance Due   Topic Date Due    Breast Cancer Screen Mammogram  2021    Flu Vaccine (1) 2021       Patient Care Team   Patient Care Team:  Alicia Levy MD as PCP - General (Internal Medicine)  Alicia Levy [facility]  Arabella Pennington MD as Physician (Sleep Medicine)    History     Patient Active Problem List   Diagnosis Code    Hypothyroidism, adult E03.9    Essential hypertension I10    Elevated lipids E78.5    Skin lesions, generalized L98.9    Asthma J45.909    Recurrent depression ([address] F33.9     Past Medical History:   Diagnosis Date    Arthritis     Asthma     Elevated lipids 9/15/2015    Essential hypertension 9/15/2015    Hypercholesterolemia     Hypothyroidism, adult 9/15/2015    Migraine     allergy mediated in sping and fall    Sun-damaged skin       Past Surgical History:   Procedure Laterality Date    COLONOSCOPY N/A 10/4/2019    COLONOSCOPY performed by Elizabeth Sandhoff, MD at [address] Left yrs ago    neg; surgical bx    HX  SECTION      HX CHOLECYSTECTOMY      HX CYST INCISION AND DRAINAGE Left over years    neg    HX KNEE ARTHROSCOPY Right     x 2    HX KNEE REPLACEMENT Right 2020    HX SHOULDER ARTHROSCOPY Left     HX TONSILLECTOMY       Current Outpatient Medications   Medication Sig Dispense Refill    losartan (COZAAR) 50 mg tablet Take 1 Tablet by mouth daily.  90 Tablet 0    SUMAtriptan (IMITREX) 100 mg tablet TAKE ONE TABLET BY MOUTH ONCE DAILY AS NEEDED FOR MIGRAINE ini can take another in 2 hours max 200 mg/day 18 Tablet 3    albuterol (ProAir HFA) 90 mcg/actuation inhaler USE 2 INHALATIONS EVERY 4 HOURS AS NEEDED FOR WHEEZING OR SHORTNESS OF BREATH 1 Each 0    guaiFENesin ER (MUCINEX) 600 mg ER tablet Take 1 Tablet by mouth two (2) times a day. 60 Tablet 0    budesonide-formoteroL (SYMBICORT) 160-4.5 mcg/actuation HFAA Take 2 Puffs by inhalation two (2) times a day. 10.2 g 0    predniSONE (DELTASONE) 20 mg tablet Take 40 mg by mouth daily (with breakfast). 14 Tablet 0    zolpidem (AMBIEN) 5 mg tablet Take 1 Tablet by mouth nightly as needed for Sleep. Max Daily Amount: 5 mg. 20 Tablet 0    montelukast (SINGULAIR) 10 mg tablet Take 1 Tablet by mouth daily. 90 Tablet 3    levothyroxine (SYNTHROID) 50 mcg tablet Take 1 Tablet by mouth Daily (before breakfast). 90 Tablet 0    hydroCHLOROthiazide (HYDRODIURIL) 12.5 mg tablet Take 1 Tablet by mouth daily. 90 Tablet 1    gabapentin (NEURONTIN) 100 mg capsule Take 1 Capsule by mouth nightly. Max Daily Amount: 100 mg. 90 Capsule 0    escitalopram oxalate (LEXAPRO) 20 mg tablet TAKE 1 TABLET DAILY 90 Tab 3    rosuvastatin (CRESTOR) 20 mg tablet Take 1/2 tablet for 2 weeks then 1 tablet p.o. daily (Patient not taking: Reported on 3/3/2022) 90 Tablet 0     Allergies   Allergen Reactions    Sulfa (Sulfonamide Antibiotics) Anaphylaxis       Family History   Problem Relation Age of Onset    Mult Sclerosis Mother     Cancer Mother         uterus/skin cancer    Cancer Father 72        colon    Parkinson's Disease Sister 61    Mult Sclerosis Brother     Parkinson's Disease Brother      Social History     Tobacco Use    Smoking status: Never Smoker    Smokeless tobacco: Never Used   Substance Use Topics    Alcohol use: Yes     Alcohol/week: 5.8 standard drinks     Types: 7 Glasses of wine per week         Shai Delaney MD                 This medical record was transcribed using an electronic medical records/speech recognition system.   Although proofread, it may and can contain electronic, spelling and other errors. Corrections may be executed at a later time. Please contact us for any clarifications as needed. Aside from patient's Medicare visitOn this date 03/09/22  I have spent 30 minutes reviewing previous notes, test results and face to face with the patient discussing the diagnosis and importance of compliance with the treatment plan as well as documenting on the day of the visit.

## 2022-03-09 NOTE — PATIENT INSTRUCTIONS

## 2022-03-11 RX ORDER — ALBUTEROL SULFATE 0.83 MG/ML
2.5 SOLUTION RESPIRATORY (INHALATION) ONCE
Qty: 1 EACH | Refills: 0
Start: 2022-03-11 | End: 2022-07-12 | Stop reason: SDUPTHER

## 2022-03-19 PROBLEM — F33.9 RECURRENT DEPRESSION (HCC): Status: ACTIVE | Noted: 2018-01-18

## 2022-03-24 DIAGNOSIS — F32.89 OTHER DEPRESSION: ICD-10-CM

## 2022-03-24 DIAGNOSIS — R51.9 CHRONIC INTRACTABLE HEADACHE, UNSPECIFIED HEADACHE TYPE: ICD-10-CM

## 2022-03-24 DIAGNOSIS — F43.9 SITUATIONAL STRESS: ICD-10-CM

## 2022-03-24 DIAGNOSIS — G89.29 CHRONIC INTRACTABLE HEADACHE, UNSPECIFIED HEADACHE TYPE: ICD-10-CM

## 2022-03-24 RX ORDER — ESCITALOPRAM OXALATE 20 MG/1
20 TABLET ORAL DAILY
Qty: 90 TABLET | Refills: 3 | Status: SHIPPED | OUTPATIENT
Start: 2022-03-24

## 2022-03-24 RX ORDER — GABAPENTIN 100 MG/1
100 CAPSULE ORAL
Qty: 90 CAPSULE | Refills: 0 | Status: SHIPPED | OUTPATIENT
Start: 2022-03-24 | End: 2022-10-24 | Stop reason: SDUPTHER

## 2022-03-24 RX ORDER — ZOLPIDEM TARTRATE 5 MG/1
5 TABLET ORAL
Qty: 20 TABLET | Refills: 0 | Status: SHIPPED | OUTPATIENT
Start: 2022-03-24 | End: 2022-07-12 | Stop reason: SDUPTHER

## 2022-03-30 NOTE — TELEPHONE ENCOUNTER
Incoming referral from Dr. Driscoll about Sang.    OptionUniversity Hospitals Portage Medical Center Driscoll.   Patient was calling to say she needs referral for [facility] - patient does NOT have appointment set up yet, they told her she needed referral before setting up appointment/getting a provider. [facility]   [facility] [address] [place] [address]     Telephone: [phone]    Fax: [phone]    She had fallen on her tailbone back in August and was told this would need to be her next step.

## 2022-04-25 ENCOUNTER — HOSPITAL ENCOUNTER (OUTPATIENT)
Dept: MAMMOGRAPHY | Age: 72
Discharge: HOME | End: 2022-04-25
Attending: INTERNAL MEDICINE
Payer: MEDICARE

## 2022-04-25 DIAGNOSIS — Z12.31 ENCOUNTER FOR SCREENING MAMMOGRAM FOR MALIGNANT NEOPLASM OF BREAST: ICD-10-CM

## 2022-04-25 DIAGNOSIS — Z00.00 MEDICARE ANNUAL WELLNESS VISIT, SUBSEQUENT: ICD-10-CM

## 2022-04-25 DIAGNOSIS — Z78.0 POSTMENOPAUSAL STATE: ICD-10-CM

## 2022-04-25 PROCEDURE — 77080 DXA BONE DENSITY AXIAL: CPT

## 2022-04-25 PROCEDURE — 77067 SCR MAMMO BI INCL CAD: CPT

## 2022-04-29 ENCOUNTER — PATIENT MESSAGE (OUTPATIENT)
Dept: INTERNAL MEDICINE CLINIC | Age: 72
End: 2022-04-29

## 2022-04-29 RX ORDER — FLUTICASONE PROPIONATE AND SALMETEROL 250; 50 UG/1; UG/1
1 POWDER RESPIRATORY (INHALATION) EVERY 12 HOURS
Qty: 60 EACH | Refills: 1 | Status: SHIPPED | OUTPATIENT
Start: 2022-04-29

## 2022-04-29 NOTE — TELEPHONE ENCOUNTER
From: Louise Ramires  To: Lula Husain MD  Sent: 4/29/2022 9:34 AM EDT  Subject: advair disc    Good morning. advair disc is, for some reason, not on my prescription list.  Please send a prescription to my pharmacy; my asthma is extremely bad this week.     Thanks,  Raghavendra Ochoa

## 2022-05-13 RX ORDER — HYDROCHLOROTHIAZIDE 12.5 MG/1
TABLET ORAL
Qty: 30 TABLET | Refills: 1 | Status: SHIPPED | OUTPATIENT
Start: 2022-05-13 | End: 2022-07-12 | Stop reason: SDUPTHER

## 2022-06-20 RX ORDER — LOSARTAN POTASSIUM 50 MG/1
TABLET ORAL
Qty: 90 TABLET | Refills: 0 | Status: SHIPPED | OUTPATIENT
Start: 2022-06-20 | End: 2022-09-21

## 2022-07-06 DIAGNOSIS — E03.9 ACQUIRED HYPOTHYROIDISM: ICD-10-CM

## 2022-07-06 RX ORDER — LEVOTHYROXINE SODIUM 50 UG/1
TABLET ORAL
Qty: 90 TABLET | Refills: 0 | Status: SHIPPED | OUTPATIENT
Start: 2022-07-06 | End: 2022-10-11

## 2022-07-12 DIAGNOSIS — F32.89 OTHER DEPRESSION: ICD-10-CM

## 2022-07-12 DIAGNOSIS — J45.41 MODERATE PERSISTENT ASTHMA WITH ACUTE EXACERBATION: ICD-10-CM

## 2022-07-12 DIAGNOSIS — R51.9 CHRONIC INTRACTABLE HEADACHE, UNSPECIFIED HEADACHE TYPE: ICD-10-CM

## 2022-07-12 DIAGNOSIS — G89.29 CHRONIC INTRACTABLE HEADACHE, UNSPECIFIED HEADACHE TYPE: ICD-10-CM

## 2022-07-13 RX ORDER — ALBUTEROL SULFATE 0.83 MG/ML
2.5 SOLUTION RESPIRATORY (INHALATION) ONCE
Qty: 30 EACH | Refills: 0 | Status: SHIPPED | OUTPATIENT
Start: 2022-07-13 | End: 2022-07-14 | Stop reason: SDUPTHER

## 2022-07-13 RX ORDER — ALBUTEROL SULFATE 90 UG/1
AEROSOL, METERED RESPIRATORY (INHALATION)
Qty: 1 EACH | Refills: 0 | Status: SHIPPED | OUTPATIENT
Start: 2022-07-13 | End: 2022-08-15

## 2022-07-13 RX ORDER — SUMATRIPTAN 100 MG/1
TABLET, FILM COATED ORAL
Qty: 18 TABLET | Refills: 0 | Status: SHIPPED | OUTPATIENT
Start: 2022-07-13 | End: 2022-10-24 | Stop reason: SDUPTHER

## 2022-07-13 RX ORDER — ZOLPIDEM TARTRATE 5 MG/1
5 TABLET ORAL
Qty: 20 TABLET | Refills: 0 | Status: SHIPPED | OUTPATIENT
Start: 2022-07-13 | End: 2022-10-24 | Stop reason: SDUPTHER

## 2022-07-14 RX ORDER — HYDROCHLOROTHIAZIDE 12.5 MG/1
12.5 TABLET ORAL DAILY
Qty: 30 TABLET | Refills: 0 | Status: SHIPPED | OUTPATIENT
Start: 2022-07-14 | End: 2022-08-15

## 2022-07-14 RX ORDER — ALBUTEROL SULFATE 0.83 MG/ML
2.5 SOLUTION RESPIRATORY (INHALATION) AS NEEDED
Qty: 90 NEBULE | Refills: 0 | Status: SHIPPED | OUTPATIENT
Start: 2022-07-14 | End: 2022-07-18 | Stop reason: SDUPTHER

## 2022-07-14 NOTE — TELEPHONE ENCOUNTER
Pharmacy called they need a corrected rx sent to them. The quantity for the first script was incorrect.

## 2022-07-18 DIAGNOSIS — J45.40 MODERATE PERSISTENT ASTHMA WITHOUT COMPLICATION: Primary | ICD-10-CM

## 2022-07-18 RX ORDER — ALBUTEROL SULFATE 0.83 MG/ML
2.5 SOLUTION RESPIRATORY (INHALATION)
Qty: 90 NEBULE | Refills: 0 | Status: SHIPPED | OUTPATIENT
Start: 2022-07-18 | End: 2022-08-15

## 2022-08-14 DIAGNOSIS — J45.41 MODERATE PERSISTENT ASTHMA WITH ACUTE EXACERBATION: ICD-10-CM

## 2022-08-15 RX ORDER — ALBUTEROL SULFATE 90 UG/1
AEROSOL, METERED RESPIRATORY (INHALATION)
Qty: 8.5 G | Refills: 1 | Status: SHIPPED | OUTPATIENT
Start: 2022-08-15 | End: 2022-10-24 | Stop reason: SDUPTHER

## 2022-08-15 RX ORDER — HYDROCHLOROTHIAZIDE 12.5 MG/1
TABLET ORAL
Qty: 30 TABLET | Refills: 0 | Status: SHIPPED | OUTPATIENT
Start: 2022-08-15 | End: 2022-09-23 | Stop reason: SDUPTHER

## 2022-09-21 RX ORDER — LOSARTAN POTASSIUM 50 MG/1
TABLET ORAL
Qty: 90 TABLET | Refills: 0 | Status: SHIPPED | OUTPATIENT
Start: 2022-09-21

## 2022-09-22 RX ORDER — HYDROCHLOROTHIAZIDE 12.5 MG/1
12.5 TABLET ORAL DAILY
Qty: 30 TABLET | Refills: 0 | Status: CANCELLED | OUTPATIENT
Start: 2022-09-22

## 2022-09-23 RX ORDER — HYDROCHLOROTHIAZIDE 12.5 MG/1
12.5 TABLET ORAL DAILY
Qty: 30 TABLET | Refills: 0 | Status: SHIPPED | OUTPATIENT
Start: 2022-09-23

## 2022-10-10 DIAGNOSIS — E03.9 ACQUIRED HYPOTHYROIDISM: ICD-10-CM

## 2022-10-11 RX ORDER — LEVOTHYROXINE SODIUM 50 UG/1
TABLET ORAL
Qty: 90 TABLET | Refills: 0 | Status: SHIPPED | OUTPATIENT
Start: 2022-10-11 | End: 2022-10-27

## 2022-10-11 NOTE — TELEPHONE ENCOUNTER
Please call patient, no further refills until office visit. If bridge fill is needed due to NOV > 30 days, please place order.  (Maynor Salazar)

## 2022-10-17 ENCOUNTER — TELEPHONE (OUTPATIENT)
Dept: INTERNAL MEDICINE CLINIC | Age: 72
End: 2022-10-17

## 2022-10-17 NOTE — TELEPHONE ENCOUNTER
----- Message from Zainab Fabian sent at 10/14/2022 10:48 AM EDT -----  Subject: Message to Provider    QUESTIONS  Information for Provider? Patient is coming in for med check routine check   on oct 24 and can she do blood work ahead of time callher about the order. cb pt   ---------------------------------------------------------------------------  --------------  Clive HEATH  [phone]; OK to leave message on voicemail  ---------------------------------------------------------------------------  --------------  SCRIPT ANSWERS  Relationship to Patient?  Self

## 2022-10-17 NOTE — TELEPHONE ENCOUNTER
I spoke with patient, she hasn't had routine labs done since 1101/2021, pcp may order more than just a tsh. I recommend she wait until her visit to get labs drawn. Pt will arrive fasting for AM appt. Pt was thankful.

## 2022-10-24 ENCOUNTER — OFFICE VISIT (OUTPATIENT)
Dept: INTERNAL MEDICINE CLINIC | Age: 72
End: 2022-10-24
Payer: MEDICARE

## 2022-10-24 VITALS
TEMPERATURE: 97.5 F | DIASTOLIC BLOOD PRESSURE: 77 MMHG | SYSTOLIC BLOOD PRESSURE: 119 MMHG | HEART RATE: 89 BPM | RESPIRATION RATE: 16 BRPM | OXYGEN SATURATION: 93 % | BODY MASS INDEX: 26.37 KG/M2 | HEIGHT: 68 IN | WEIGHT: 174 LBS

## 2022-10-24 DIAGNOSIS — G89.29 CHRONIC INTRACTABLE HEADACHE, UNSPECIFIED HEADACHE TYPE: ICD-10-CM

## 2022-10-24 DIAGNOSIS — J45.41 MODERATE PERSISTENT ASTHMA WITH ACUTE EXACERBATION: ICD-10-CM

## 2022-10-24 DIAGNOSIS — R51.9 CHRONIC INTRACTABLE HEADACHE, UNSPECIFIED HEADACHE TYPE: ICD-10-CM

## 2022-10-24 DIAGNOSIS — E78.5 ELEVATED LIPIDS: ICD-10-CM

## 2022-10-24 DIAGNOSIS — F32.89 OTHER DEPRESSION: ICD-10-CM

## 2022-10-24 DIAGNOSIS — F43.9 SITUATIONAL STRESS: ICD-10-CM

## 2022-10-24 DIAGNOSIS — G43.919 INTRACTABLE MIGRAINE WITHOUT STATUS MIGRAINOSUS, UNSPECIFIED MIGRAINE TYPE: Primary | ICD-10-CM

## 2022-10-24 DIAGNOSIS — E03.9 ACQUIRED HYPOTHYROIDISM: ICD-10-CM

## 2022-10-24 PROCEDURE — G8754 DIAS BP LESS 90: HCPCS | Performed by: INTERNAL MEDICINE

## 2022-10-24 PROCEDURE — 99215 OFFICE O/P EST HI 40 MIN: CPT | Performed by: INTERNAL MEDICINE

## 2022-10-24 PROCEDURE — 1101F PT FALLS ASSESS-DOCD LE1/YR: CPT | Performed by: INTERNAL MEDICINE

## 2022-10-24 PROCEDURE — G0463 HOSPITAL OUTPT CLINIC VISIT: HCPCS | Performed by: INTERNAL MEDICINE

## 2022-10-24 PROCEDURE — G9717 DOC PT DX DEP/BP F/U NT REQ: HCPCS | Performed by: INTERNAL MEDICINE

## 2022-10-24 PROCEDURE — 3017F COLORECTAL CA SCREEN DOC REV: CPT | Performed by: INTERNAL MEDICINE

## 2022-10-24 PROCEDURE — G8399 PT W/DXA RESULTS DOCUMENT: HCPCS | Performed by: INTERNAL MEDICINE

## 2022-10-24 PROCEDURE — G8427 DOCREV CUR MEDS BY ELIG CLIN: HCPCS | Performed by: INTERNAL MEDICINE

## 2022-10-24 PROCEDURE — G9899 SCRN MAM PERF RSLTS DOC: HCPCS | Performed by: INTERNAL MEDICINE

## 2022-10-24 PROCEDURE — G8752 SYS BP LESS 140: HCPCS | Performed by: INTERNAL MEDICINE

## 2022-10-24 PROCEDURE — G8536 NO DOC ELDER MAL SCRN: HCPCS | Performed by: INTERNAL MEDICINE

## 2022-10-24 PROCEDURE — G8417 CALC BMI ABV UP PARAM F/U: HCPCS | Performed by: INTERNAL MEDICINE

## 2022-10-24 PROCEDURE — 1090F PRES/ABSN URINE INCON ASSESS: CPT | Performed by: INTERNAL MEDICINE

## 2022-10-24 RX ORDER — ZOLPIDEM TARTRATE 5 MG/1
5 TABLET ORAL
Qty: 20 TABLET | Refills: 0 | Status: SHIPPED | OUTPATIENT
Start: 2022-10-24 | End: 2022-12-01

## 2022-10-24 RX ORDER — GABAPENTIN 100 MG/1
100 CAPSULE ORAL
Qty: 90 CAPSULE | Refills: 3 | Status: SHIPPED | OUTPATIENT
Start: 2022-10-24 | End: 2022-12-01

## 2022-10-24 RX ORDER — ALBUTEROL SULFATE 90 UG/1
AEROSOL, METERED RESPIRATORY (INHALATION)
Qty: 8.5 G | Refills: 1 | Status: SHIPPED | OUTPATIENT
Start: 2022-10-24

## 2022-10-24 RX ORDER — SUMATRIPTAN 100 MG/1
TABLET, FILM COATED ORAL
Qty: 18 TABLET | Refills: 0 | Status: SHIPPED | OUTPATIENT
Start: 2022-10-24 | End: 2022-11-28 | Stop reason: SDUPTHER

## 2022-10-24 RX ORDER — PREDNISONE 20 MG/1
40 TABLET ORAL
Qty: 16 TABLET | Refills: 0 | Status: SHIPPED | OUTPATIENT
Start: 2022-10-24

## 2022-10-24 RX ORDER — FUROSEMIDE 20 MG/1
20 TABLET ORAL
Qty: 25 TABLET | Refills: 0 | Status: SHIPPED | OUTPATIENT
Start: 2022-10-24 | End: 2022-11-28 | Stop reason: SDUPTHER

## 2022-10-24 NOTE — PROGRESS NOTES
Diagnoses and all orders for this visit:    1. Intractable migraine without status migrainosus, unspecified migraine type  Has sinus related headaches with transition to migraines and has been getting relief with Imitrex for several years      2. Moderate persistent asthma with acute exacerbation  Not optimally controlled  Discussed that she would benefit from another evaluation from pulmonary to do pulmonary function testing and advise with regards to medications/changes    Compliance with Advair twice daily rather than daily. Monitor symptoms  Add back prednisone    Will give trial of Lasix as well as lower lobe crackles noted no edema-     albuterol (PROVENTIL HFA, VENTOLIN HFA, PROAIR HFA) 90 mcg/actuation inhaler; INHALE TWO PUFFS BY MOUTH EVERY 4 HOURS AS NEEDED FOR WHEEZING/SHORTNESS OF BREATH  -     REFERRAL TO PULMONARY DISEASE  -     predniSONE (DELTASONE) 20 mg tablet; Take 2 Tablets by mouth daily (with breakfast). For 1 week then take 1/2 tab for 4 days    3. Other depression  Continue to use for insomnia as needed  -     zolpidem (AMBIEN) 5 mg tablet; Take 1 Tablet by mouth nightly as needed for Sleep. Max Daily Amount: 5 mg.    4. Situational stress  Doing well on gabapentin  -     gabapentin (NEURONTIN) 100 mg capsule; Take 1 Capsule by mouth nightly. Max Daily Amount: 100 mg.    5. Chronic intractable headache, unspecified headache type  Continue gabapentin as needed for migraine prophylaxis to avoid use of Imitrex  -     gabapentin (NEURONTIN) 100 mg capsule; Take 1 Capsule by mouth nightly. Max Daily Amount: 100 mg.  -     SUMAtriptan (IMITREX) 100 mg tablet; TAKE ONE TABLET BY MOUTH ONCE DAILY AS NEEDED FOR MIGRAINE ini can take another in 2 hours max 200 mg/day    6. Acquired hypothyroidism  Clinically euthyroid-     TSH 3RD GENERATION; Future    7.  Elevated lipids  Patient is not on statin  Discussed with her and will initiate rosuvastatin low-dose again  Discussed again ramifications of uncontrolled cholesterol and given headaches puts her at risk for a cardiovascular event  Rosuvastatin-     LIPID PANEL; Future  -     METABOLIC PANEL, COMPREHENSIVE; Future    Other orders  -     furosemide (LASIX) 20 mg tablet; Take 1 Tablet by mouth daily as needed (bloat). Screen for colon cancer she was evaluated by Dr. Hamzah Rico. She will be screened every 5 years. Screened in 2019 and follow-up in 2024      Encounter for screening mammogram for malignant neoplasm of breast  Discussed with patient and should have mammogram screenings annually  Patient had mammogram April 2022 and patient have another one in October 22 at the [address] [place] [address] INCL CAD; Future    Pt had mammogram 0ct 2022 at [facility] as well      Patient has osteopenia and also osteoporosis by history of fracture  Bone density reviewed with her    Not interested in Fosamax  Will take calcium and diet changes  Behavioral weight resitnane training    Screening for alcoholism  No alcohol issues  Drinks 1 serving about 3 times per week    Less etoh, does not like as much    Screening for depression  Currently on Lexapro  Depression controlled  Of note she is also caring for her 80-year-old younger sister with Parkinson's. She cares for her on Tuesdays and Thursdays. Has a very nice boyfriend  She is still taking care of her sister with Parkinson's    boxing    Essential hypertension  Controlled    Not checking bp at home  Walking 3 miles per  day,no cp or sob      9.  Mild asthma without complication, unspecified whether persistent  No longer wheezing and improved from last visit  Needs to follow-up with allergy for pulmonary function testing  Chest x-ray resolved left upper lobe lesion  Continue Symbicort    Alubterol 4 tiems/day   adv    Chief Complaint   Patient presents with    Medication Evaluation    Labs     Fasting    Hyperthyroidism         Asthma  Patient reports that her asthma seems to be kicking up with a change in the fall weather. She notes that she is using the albuterol several times a day. She was last seen by allergy several years ago and offered allergy shots. She does not want immunotherapy. She is not wheezing but does have some tightness in her chest which is how she manifests with her asthma. She is using her Advair Diskus daily but not twice daily. Background  Symptoms appear to be improved with prednisone and Symbicort. She is not having any fevers. She was evaluated by ENT Dr. Alfredo Bhandari and found to have deviated septum which may be contributing to a lot of her postnasal drip and respiratory symptoms. She will also be seeing an allergist at the end of this month. She is no longer wheezing. She does still have some shortness of breath with walking. She had skin testing in the past and no longer needed. She has a lot of postnasal drip. She mentions facial surgeries and possibly residual complications from the surgeries may be contributing to her narrow passage and impairment of drainage    Headaches  Sinus related. These transition to headaches and then migraines. She has found relief with Imitrex. Depression background  She is currently on Lexapro and feels this is the right dose. She defers counseling. She helps care for her first  and was very close to him. She is now caring for her sister, 72years old, with Parkinson disease. She takes care of her sister Tuesdays and Thursdays. She brings her to a boxing class only for Parkinson's patients. Of note, patient used to do a lot of sewing and to care of her and her sister when they were younger. She often made beautiful dresses and outfits for her younger sister. She reports she got her sister all dolled up for her niece's wedding. She has met a new man her boyfriend. Subjective: Polo Berry is a 74 y.o. female with hypertension.   Hypertension ROS: taking medications as instructed, no medication side effects noted, no TIA's, no chest pain on exertion, no dyspnea on exertion, no swelling of ankles. New concerns: No change    Hyperlipidemia  Patient reports she is not taking her statin as we discussed at last visit. She does not have any chest pain or shortness of breath. She exercises and does [employer] house without cardiac sx. Past Medical History:   Diagnosis Date    Arthritis     Asthma     Elevated lipids 9/15/2015    Essential hypertension 9/15/2015    Hypercholesterolemia     Hypothyroidism, adult 9/15/2015    Migraine     allergy mediated in sping and fall    Sun-damaged skin      Past Surgical History:   Procedure Laterality Date    COLONOSCOPY N/A 10/4/2019    COLONOSCOPY performed by Brittany Bradley MD at [facility] ENDOSCOPY    HX BREAST BIOPSY Left yrs ago    neg; surgical bx    HX  SECTION      HX CHOLECYSTECTOMY      HX CYST INCISION AND DRAINAGE Left over years    neg    HX KNEE ARTHROSCOPY Right     x 2    HX KNEE REPLACEMENT Right 2020    HX SHOULDER ARTHROSCOPY Left     HX TONSILLECTOMY       Social History     Socioeconomic History    Marital status:    Tobacco Use    Smoking status: Never    Smokeless tobacco: Never   Substance and Sexual Activity    Alcohol use:  Yes     Alcohol/week: 5.8 standard drinks     Types: 7 Glasses of wine per week    Drug use: No    Sexual activity: Yes     Partners: Male   Social History Narrative    Retired from [employer] in [place]    Stressful        Hx of situational stress     passed cancer duodenal    Mother was sick 79 yo with MS actually did well        Recently   In 2017        1 son, doing well     Family History   Problem Relation Age of Onset    Mult Sclerosis Mother     Cancer Mother         uterus/skin cancer    Cancer Father 72        colon    Parkinson's Disease Sister 61    Mult Sclerosis Brother     Parkinson's Disease Brother      Current Outpatient Medications   Medication Sig Dispense Refill    levothyroxine (SYNTHROID) 50 mcg tablet TAKE ONE TABLET BY MOUTH EVERY MORNING BEFORE BREAKFAST 90 Tablet 0    hydroCHLOROthiazide (HYDRODIURIL) 12.5 mg tablet Take 1 Tablet by mouth daily. 30 Tablet 0    losartan (COZAAR) 50 mg tablet TAKE ONE TABLET BY MOUTH DAILY 90 Tablet 0    albuterol (PROVENTIL HFA, VENTOLIN HFA, PROAIR HFA) 90 mcg/actuation inhaler INHALE TWO PUFFS BY MOUTH EVERY 4 HOURS AS NEEDED FOR WHEEZING/SHORTNESS OF BREATH 8.5 g 1    SUMAtriptan (IMITREX) 100 mg tablet TAKE ONE TABLET BY MOUTH ONCE DAILY AS NEEDED FOR MIGRAINE ini can take another in 2 hours max 200 mg/day 18 Tablet 0    zolpidem (AMBIEN) 5 mg tablet Take 1 Tablet by mouth nightly as needed for Sleep. Max Daily Amount: 5 mg. 20 Tablet 0    fluticasone propion-salmeteroL (Advair Diskus) 250-50 mcg/dose diskus inhaler Take 1 Puff by inhalation every twelve (12) hours. 60 Each 1    escitalopram oxalate (LEXAPRO) 20 mg tablet Take 1 Tablet by mouth daily. 90 Tablet 3    gabapentin (NEURONTIN) 100 mg capsule Take 1 Capsule by mouth nightly. Max Daily Amount: 100 mg. 90 Capsule 0    montelukast (SINGULAIR) 10 mg tablet Take 1 Tablet by mouth daily. 90 Tablet 3    guaiFENesin ER (MUCINEX) 600 mg ER tablet Take 1 Tablet by mouth two (2) times a day. 60 Tablet 0    predniSONE (DELTASONE) 20 mg tablet Take 40 mg by mouth daily (with breakfast).  14 Tablet 0    rosuvastatin (CRESTOR) 20 mg tablet Take 1/2 tablet for 2 weeks then 1 tablet p.o. daily (Patient not taking: No sig reported) 90 Tablet 0     Allergies   Allergen Reactions    Sulfa (Sulfonamide Antibiotics) Anaphylaxis       Review of Systems - General ROS: negative for - chills or fever  Cardiovascular ROS: no chest pain or dyspnea on exertion  Respiratory ROS: no cough, shortness of breath, or wheezing    Visit Vitals  /77 (BP 1 Location: Left upper arm, BP Patient Position: Sitting, BP Cuff Size: Adult)   Pulse 89   Temp 97.5 °F (36.4 °C) (Oral)   Resp 16   Ht 5' 8\" (1.727 m)   Wt 174 lb (78.9 kg)   LMP 03/15/1998   SpO2 93%   BMI 26.46 kg/m²     Constitutional: [x] Appears well-developed and well-nourished [x] No apparent distress      [] Abnormal -     Mental status: [x] Alert and awake  [x] Oriented to person/place/time [x] Able to follow commands    [] Abnormal -     Eyes:   EOM    [x]  Normal    [] Abnormal -   Sclera  [x]  Normal    [] Abnormal -          Discharge [x]  None visible   [] Abnormal -     HENT: [x] Normocephalic, atraumatic  [] Abnormal -   [x] Mouth/Throat: Mucous membranes are moist    External Ears [x] Normal  [] Abnormal -    Neck: [x] No visualized mass [] Abnormal -     Pulmonary/Chest: [x] Respiratory effort normal   [x] No visualized signs of difficulty breathing or respiratory distress        [] Abnormal -      Musculoskeletal:   [x] Normal gait with no signs of ataxia         [x] Normal range of motion of neck        [] Abnormal -     Neurological:        [x] No Facial Asymmetry (Cranial nerve 7 motor function) (limited exam due to video visit)          [x] No gaze palsy        [] Abnormal -          Skin:        [x] No significant exanthematous lesions or discoloration noted on facial skin         [] Abnormal -            Psychiatric:       [x] Normal Affect [] Abnormal -        [x] No Hallucinations                                                                 This medical record was transcribed using an electronic medical records/speech recognition system. Although proofread, it may and can contain electronic, spelling and other errors. Corrections may be executed at a later time. Please contact us for any clarifications as needed. Aside from patient's Medicare visitOn this date 10/24/44361  I have spent  40 minutes reviewing previous notes, test results and face to face with the patient discussing the diagnosis and importance of compliance with the treatment plan as well as documenting on the day of the visit.

## 2022-10-25 LAB
ALBUMIN SERPL-MCNC: 3.6 G/DL (ref 3.5–5)
ALBUMIN/GLOB SERPL: 1.2 {RATIO} (ref 1.1–2.2)
ALP SERPL-CCNC: 77 U/L (ref 45–117)
ALT SERPL-CCNC: 26 U/L (ref 12–78)
ANION GAP SERPL CALC-SCNC: 7 MMOL/L (ref 5–15)
AST SERPL-CCNC: 16 U/L (ref 15–37)
BILIRUB SERPL-MCNC: 0.4 MG/DL (ref 0.2–1)
BUN SERPL-MCNC: 11 MG/DL (ref 6–20)
BUN/CREAT SERPL: 14 (ref 12–20)
CALCIUM SERPL-MCNC: 9.6 MG/DL (ref 8.5–10.1)
CHLORIDE SERPL-SCNC: 105 MMOL/L (ref 97–108)
CHOLEST SERPL-MCNC: 322 MG/DL
CO2 SERPL-SCNC: 30 MMOL/L (ref 21–32)
CREAT SERPL-MCNC: 0.81 MG/DL (ref 0.55–1.02)
GLOBULIN SER CALC-MCNC: 3.1 G/DL (ref 2–4)
GLUCOSE SERPL-MCNC: 101 MG/DL (ref 65–100)
HDLC SERPL-MCNC: 60 MG/DL
HDLC SERPL: 5.4 {RATIO} (ref 0–5)
LDLC SERPL CALC-MCNC: 236.8 MG/DL (ref 0–100)
POTASSIUM SERPL-SCNC: 4.5 MMOL/L (ref 3.5–5.1)
PROT SERPL-MCNC: 6.7 G/DL (ref 6.4–8.2)
SODIUM SERPL-SCNC: 142 MMOL/L (ref 136–145)
TRIGL SERPL-MCNC: 126 MG/DL (ref ?–150)
TSH SERPL DL<=0.05 MIU/L-ACNC: 5.43 UIU/ML (ref 0.36–3.74)
VLDLC SERPL CALC-MCNC: 25.2 MG/DL

## 2022-10-27 DIAGNOSIS — E03.9 ACQUIRED HYPOTHYROIDISM: ICD-10-CM

## 2022-10-27 DIAGNOSIS — E78.9 LIPID DISORDER: Primary | ICD-10-CM

## 2022-10-27 RX ORDER — ROSUVASTATIN CALCIUM 20 MG/1
TABLET, COATED ORAL
Qty: 90 TABLET | Refills: 0 | Status: SHIPPED | OUTPATIENT
Start: 2022-10-27

## 2022-10-27 RX ORDER — LEVOTHYROXINE SODIUM 75 UG/1
75 TABLET ORAL
Qty: 90 TABLET | Refills: 0 | Status: SHIPPED | OUTPATIENT
Start: 2022-10-27

## 2022-10-28 NOTE — PROGRESS NOTES
Please let patient know I sent her a letter. I increased her thyroid. I reordered her cholesterol medication. I also ordered a heart CT scan which will help us to risk stratify. I do not think she has had 1 but we should get one for her. Thank you.

## 2022-11-03 ENCOUNTER — HOSPITAL ENCOUNTER (OUTPATIENT)
Dept: CT IMAGING | Age: 72
Discharge: HOME | End: 2022-11-03
Attending: INTERNAL MEDICINE
Payer: SELF-PAY

## 2022-11-03 DIAGNOSIS — E78.9 LIPID DISORDER: ICD-10-CM

## 2022-11-03 PROCEDURE — 75571 CT HRT W/O DYE W/CA TEST: CPT

## 2022-11-11 RX ORDER — HYDROCHLOROTHIAZIDE 12.5 MG/1
12.5 TABLET ORAL DAILY
Qty: 30 TABLET | Refills: 1 | Status: SHIPPED | OUTPATIENT
Start: 2022-11-11 | End: 2022-12-01

## 2022-11-27 ENCOUNTER — PATIENT MESSAGE (OUTPATIENT)
Dept: OBGYN CLINIC | Age: 72
End: 2022-11-27

## 2022-11-28 DIAGNOSIS — J45.41 MODERATE PERSISTENT ASTHMA WITH ACUTE EXACERBATION: ICD-10-CM

## 2022-11-28 DIAGNOSIS — R51.9 CHRONIC INTRACTABLE HEADACHE, UNSPECIFIED HEADACHE TYPE: ICD-10-CM

## 2022-11-28 DIAGNOSIS — G89.29 CHRONIC INTRACTABLE HEADACHE, UNSPECIFIED HEADACHE TYPE: ICD-10-CM

## 2022-11-29 DIAGNOSIS — J45.41 MODERATE PERSISTENT ASTHMA WITH ACUTE EXACERBATION: ICD-10-CM

## 2022-11-29 RX ORDER — MONTELUKAST SODIUM 10 MG/1
10 TABLET ORAL DAILY
Qty: 90 TABLET | Refills: 3 | Status: SHIPPED | OUTPATIENT
Start: 2022-11-29 | End: 2022-11-29

## 2022-11-29 RX ORDER — FUROSEMIDE 20 MG/1
20 TABLET ORAL
Qty: 25 TABLET | Refills: 0 | Status: SHIPPED | OUTPATIENT
Start: 2022-11-29 | End: 2022-12-01

## 2022-11-29 RX ORDER — SUMATRIPTAN 100 MG/1
TABLET, FILM COATED ORAL
Qty: 18 TABLET | Refills: 0 | Status: SHIPPED | OUTPATIENT
Start: 2022-11-29 | End: 2022-12-01

## 2022-11-29 RX ORDER — MONTELUKAST SODIUM 10 MG/1
TABLET ORAL
Qty: 90 TABLET | Refills: 3 | Status: SHIPPED | OUTPATIENT
Start: 2022-11-29

## 2022-12-01 ENCOUNTER — OFFICE VISIT (OUTPATIENT)
Dept: OBGYN CLINIC | Age: 72
End: 2022-12-01
Payer: MEDICARE

## 2022-12-01 VITALS — SYSTOLIC BLOOD PRESSURE: 148 MMHG | DIASTOLIC BLOOD PRESSURE: 78 MMHG | WEIGHT: 187.2 LBS | BODY MASS INDEX: 28.46 KG/M2

## 2022-12-01 DIAGNOSIS — Z12.4 CERVICAL CANCER SCREENING: ICD-10-CM

## 2022-12-01 DIAGNOSIS — Z01.419 ENCOUNTER FOR GYNECOLOGICAL EXAMINATION (GENERAL) (ROUTINE) WITHOUT ABNORMAL FINDINGS: ICD-10-CM

## 2022-12-01 DIAGNOSIS — N95.0 POSTMENOPAUSAL BLEEDING: ICD-10-CM

## 2022-12-01 DIAGNOSIS — Z01.419 ENCOUNTER FOR WELL WOMAN EXAM WITH ROUTINE GYNECOLOGICAL EXAM: Primary | ICD-10-CM

## 2022-12-01 PROCEDURE — G8753 SYS BP > OR = 140: HCPCS | Performed by: OBSTETRICS & GYNECOLOGY

## 2022-12-01 PROCEDURE — G8417 CALC BMI ABV UP PARAM F/U: HCPCS | Performed by: OBSTETRICS & GYNECOLOGY

## 2022-12-01 PROCEDURE — 3078F DIAST BP <80 MM HG: CPT | Performed by: OBSTETRICS & GYNECOLOGY

## 2022-12-01 PROCEDURE — 1090F PRES/ABSN URINE INCON ASSESS: CPT | Performed by: OBSTETRICS & GYNECOLOGY

## 2022-12-01 PROCEDURE — G9717 DOC PT DX DEP/BP F/U NT REQ: HCPCS | Performed by: OBSTETRICS & GYNECOLOGY

## 2022-12-01 PROCEDURE — G8754 DIAS BP LESS 90: HCPCS | Performed by: OBSTETRICS & GYNECOLOGY

## 2022-12-01 PROCEDURE — 3074F SYST BP LT 130 MM HG: CPT | Performed by: OBSTETRICS & GYNECOLOGY

## 2022-12-01 PROCEDURE — G9899 SCRN MAM PERF RSLTS DOC: HCPCS | Performed by: OBSTETRICS & GYNECOLOGY

## 2022-12-01 PROCEDURE — G0101 CA SCREEN;PELVIC/BREAST EXAM: HCPCS | Performed by: OBSTETRICS & GYNECOLOGY

## 2022-12-01 PROCEDURE — 3017F COLORECTAL CA SCREEN DOC REV: CPT | Performed by: OBSTETRICS & GYNECOLOGY

## 2022-12-01 PROCEDURE — 1101F PT FALLS ASSESS-DOCD LE1/YR: CPT | Performed by: OBSTETRICS & GYNECOLOGY

## 2022-12-01 NOTE — PROGRESS NOTES
[facility]  [email or web address]  [phone]    Kendall Wood MD, FACOG       Annual Gynecologic Exam:  [facility]   Chief Complaint   Patient presents with    Well Woman         Polo Berry is a , 74 y.o. female     She presents for her annual checkup. She is having back pain. Brief ho HRT. See below. No recent pelvic imaging. No recent STD screening. History of Present Illness: The patient is reporting having bloating for 2,. She reports the symptoms are is unchanged. Aggravating factors include none. And alleviating factors include none. Aggravating asthma. History of Present Illness: The patient is reporting having postcoital bleeding for several  weeks. She reports the symptoms are is unchanged. Aggravating factors include sexual intercourse. And alleviating factors include abstinence. Uses vaginal moisturiser (vagisil and replens)  + new partner. Per Rooming Note:      Patient's last menstrual period was 03/15/1998. Problems:  Bleeding after sexual intercourse;  painful intercourse  Birth Control: post menopausal status. Last Pap: normal obtained 2 year(s) ago. She does not have a history of ABHISHEK 2, 3 or cervical cancer. Last Mammogram:  3 -4 months ago . It was normal   Last Bone Density: abnormal obtained 2 year(s) ago. Last colonoscopy: normal obtained 2 year(s) ago.     Sexual history and Contraception:  Social History     Substance and Sexual Activity   Sexual Activity Yes    Partners: Male       Past Medical History:   Diagnosis Date    Arthritis     Asthma     Back pain     Elevated lipids 09/15/2015    Essential hypertension 09/15/2015    Hypercholesterolemia     Hypothyroidism, adult 09/15/2015    Migraine     allergy mediated in sping and fall    Sun-damaged skin      Current Outpatient Medications   Medication Sig    SUMAtriptan (IMITREX) 100 mg tablet TAKE ONE TABLET BY MOUTH ONCE DAILY AS NEEDED FOR MIGRAINE ini can take another in 2 hours max 200 mg/day (Patient not taking: Reported on 2022)    furosemide (LASIX) 20 mg tablet Take 1 Tablet by mouth daily as needed (bloat). (Patient not taking: Reported on 2022)    montelukast (SINGULAIR) 10 mg tablet TAKE ONE TABLET BY MOUTH DAILY    levothyroxine (SYNTHROID) 75 mcg tablet Take 1 Tablet by mouth Daily (before breakfast). rosuvastatin (CRESTOR) 20 mg tablet Take 1/2 tablet for 2 weeks then 1 tablet p.o. daily    albuterol (PROVENTIL HFA, VENTOLIN HFA, PROAIR HFA) 90 mcg/actuation inhaler INHALE TWO PUFFS BY MOUTH EVERY 4 HOURS AS NEEDED FOR WHEEZING/SHORTNESS OF BREATH    losartan (COZAAR) 50 mg tablet TAKE ONE TABLET BY MOUTH DAILY    fluticasone propion-salmeteroL (Advair Diskus) 250-50 mcg/dose diskus inhaler Take 1 Puff by inhalation every twelve (12) hours. escitalopram oxalate (LEXAPRO) 20 mg tablet Take 1 Tablet by mouth daily. hydroCHLOROthiazide (HYDRODIURIL) 12.5 mg tablet Take 1 Tablet by mouth daily. (Patient not taking: Reported on 2022)    zolpidem (AMBIEN) 5 mg tablet Take 1 Tablet by mouth nightly as needed for Sleep. Max Daily Amount: 5 mg. (Patient not taking: Reported on 2022)    gabapentin (NEURONTIN) 100 mg capsule Take 1 Capsule by mouth nightly. Max Daily Amount: 100 mg. (Patient not taking: Reported on 2022)    predniSONE (DELTASONE) 20 mg tablet Take 2 Tablets by mouth daily (with breakfast). For 1 week then take 1/2 tab for 4 days     No current facility-administered medications for this visit.      OB History    Para Term  AB Living   1 1 1         SAB IAB Ectopic Molar Multiple Live Births             1      # Outcome Date GA Lbr [place]/2nd Weight Sex Delivery Anes PTL Lv   1 Term              Past Surgical History:   Procedure Laterality Date    COLONOSCOPY N/A 10/4/2019    COLONOSCOPY performed by Anthony Salamanca MD at [facility] ENDOSCOPY    HX BREAST BIOPSY Left yrs ago    neg; surgical bx    HX  SECTION HX CHOLECYSTECTOMY      HX CYST INCISION AND DRAINAGE Left over years    neg    HX KNEE ARTHROSCOPY Right     x 2    HX KNEE REPLACEMENT Right 01/28/2020    HX SHOULDER ARTHROSCOPY Left     HX TONSILLECTOMY       Family History   Problem Relation Age of Onset    Mult Sclerosis Mother     Cancer Mother         uterus/skin cancer    Cancer Father 72        colon    Parkinson's Disease Sister 61    Mult Sclerosis Brother     Parkinson's Disease Brother      Social History     Socioeconomic History    Marital status:      Spouse name: Not on file    Number of children: Not on file    Years of education: Not on file    Highest education level: Not on file   Occupational History    Not on file   Tobacco Use    Smoking status: Never    Smokeless tobacco: Never   Substance and Sexual Activity    Alcohol use: Yes     Alcohol/week: 5.8 standard drinks     Types: 7 Glasses of wine per week    Drug use: No    Sexual activity: Yes     Partners: Male   Other Topics Concern    Not on file   Social History Narrative    Retired from [employer] in [place]    Stressful        Hx of situational stress     passed cancer duodenal    Mother was sick 81 yo with MS actually did well        Recently   In March 2017        1 son, doing well     Social Determinants of Health     Financial Resource Strain: Not on file   Food Insecurity: Not on file   Transportation Needs: Not on file   Physical Activity: Not on file   Stress: Not on file   Social Connections: Not on file   Intimate Partner Violence: Not on file   Housing Stability: Not on file     Tobacco History:  reports that she has never smoked. She has never used smokeless tobacco.  Alcohol Abuse:  reports current alcohol use of about 5.8 standard drinks per week. Drug Abuse:  reports no history of drug use.   Allergies   Allergen Reactions    Sulfa (Sulfonamide Antibiotics) Anaphylaxis       Patient Active Problem List   Diagnosis Code    Hypothyroidism, adult E03.9    Essential hypertension I10    Elevated lipids E78.5    Skin lesions, generalized L98.9    Asthma J45.909    Recurrent depression (HCC) F33.9           Review of Systems - History obtained from the patient and patient filled out questionnaire   Constitutional/general, HEENT, CV, Resp, GI, MSK, Neuro, Psych, Heme/lymph, Skin, Breast ROS: no significant complaints except as noted on HPI    Physical Exam  Visit Vitals  BP (!) 148/78   Wt 187 lb 3.2 oz (84.9 kg)   LMP 03/15/1998   BMI 28.46 kg/m²       Constitutional  Appearance: well-nourished, well developed, alert, in no acute distress    HENT  Head and Face: appears normal    Neck  Inspection/Palpation: normal appearance, no masses or tenderness  Lymph Nodes: no lymphadenopathy present  Thyroid: gland size normal, nontender, no nodules or masses present on palpation    Chest  Respiratory Effort: breathing unlabored  Auscultation: normal breath sounds    Cardiovascular  Heart:   Auscultation: regular rate and rhythm without murmur    Breasts  Inspection of Breasts: breasts symmetrical, no skin changes, no discharge present, nipple appearance normal, no skin retraction present  Palpation of Breasts and Axillae: no masses present on palpation, no breast tenderness  Axillary Lymph Nodes: no lymphadenopathy present    Gastrointestinal  Abdominal Examination: abdomen non-tender to palpation, normal bowel sounds, no masses present, mildly distended  Liver and spleen: no hepatomegaly present, spleen not palpable  Hernias: no hernias identified    Genitourinary  External Genitalia: normal appearance for age, atrophic changes, no discharge present, no tenderness present, no inflammatory lesions present, no masses present  Vagina: normal vaginal vault without central or paravaginal defects, thin white discharge present, no inflammatory lesions present, no masses present  Bladder: non-tender to palpation  Urethra: appears normal  Cervix: normal   Uterus: normal size, shape and consistency  Adnexa: no adnexal tenderness present, no adnexal masses present  Perineum: perineum within normal limits, no evidence of trauma, no rashes or skin lesions present  Anus: anus within normal limits, no hemorrhoids present  Inguinal Lymph Nodes: no lymphadenopathy present    Skin  General Inspection: no rash, no lesions identified    Neurologic/Psychiatric  Mental Status:  Orientation: grossly oriented to person, place and time  Mood and Affect: mood normal, affect appropriate    Assessment:  74 y.o.  for well woman exam  Encounter Diagnoses   Name Primary? Encounter for well woman exam with routine gynecological exam Yes    Cervical cancer screening     Encounter for gynecological examination (general) (routine) without abnormal findings        Plan:  The patient was counseled about healthy lifestyle, disease prevention, signs and symptoms of breast cancer and bone protection and screening tests  I recommended follow up one year for routine annual gynecologic exam or sooner prn  I recommended follow up with a primary care physician for chronic medical problems and evaluation of non-gynecologic concerns and to please contact our office with any GYN questions or concerns. Discussed avoiding painful intercourse, generous lubrication and vaginal moisturiser for at lest three months  FU three months if NI  Uberlube samples  Rec fu and us, possible endometrial biopsy and GYN follow up to evaluate other concerns. Endo bx handout given. Folllow up:  [x] return for annual well woman exam in one year or sooner if she is having problems  [] follow up and ultrasound  [] 6 months  [] 3 months  [] 6 weeks   [] 1 month    No orders of the defined types were placed in this encounter. No results found for any visits on 22.

## 2022-12-01 NOTE — PROGRESS NOTES
Magy Harmon is a 74 y.o. female returns for an annual exam     Chief Complaint   Patient presents with    Well Woman       Patient's last menstrual period was 03/15/1998. Problems:  Bleeding after sexual intercourse;  painful intercourse  Birth Control: post menopausal status. Last Pap: normal obtained 2 year(s) ago. She does not have a history of ABHISHEK 2, 3 or cervical cancer. Last Mammogram:  3 -4 months ago . It was normal   Last Bone Density: abnormal obtained 2 year(s) ago. Last colonoscopy: normal obtained 2 year(s) ago. 1. Have you been to the ER, urgent care clinic, or hospitalized since your last visit? New patient    2. Have you seen or consulted any other health care providers outside of the [address] [place] [address] since your last visit? New patient     Examination chaperoned by Cassie Antonio LPN.

## 2022-12-03 LAB
C TRACH RRNA SPEC QL NAA+PROBE: NEGATIVE
N GONORRHOEA RRNA SPEC QL NAA+PROBE: NEGATIVE
T VAGINALIS RRNA SPEC QL NAA+PROBE: NEGATIVE

## 2022-12-07 NOTE — PROGRESS NOTES
Normal pap smear, message sent if [address] active. Update PMH/HM: include: Date of pap, Cytology: wnl. For HR HPV results: list NEG or POS, when done.

## 2022-12-12 ENCOUNTER — TELEPHONE (OUTPATIENT)
Dept: OBGYN CLINIC | Age: 72
End: 2022-12-12

## 2022-12-12 NOTE — TELEPHONE ENCOUNTER
74year old patient last seen in the office on 12/1/2022 for ae    Patient has upcoming appointments on 1/20/2023 for ultrasound and follow up    Patient is calling to say that she had heavy vaginal bleeding after intercourse and a day of spotting and is wondering what she can to about that till her appointment noted above    Please advise    Thank you    ? problem visit

## 2022-12-13 NOTE — TELEPHONE ENCOUNTER
Anne Marquez MD  to Me    TP    4:53 PM  Is she still having heavy bleeding? If not, rec pelvic rest until fu. Thanks,   TRP       Patient advised of MD recommendations and verbalized understanding.

## 2022-12-22 RX ORDER — FLUTICASONE PROPIONATE AND SALMETEROL 250; 50 UG/1; UG/1
1 POWDER RESPIRATORY (INHALATION) EVERY 12 HOURS
Qty: 60 EACH | Refills: 1 | Status: SHIPPED | OUTPATIENT
Start: 2022-12-22

## 2022-12-22 RX ORDER — LOSARTAN POTASSIUM 50 MG/1
50 TABLET ORAL DAILY
Qty: 90 TABLET | Refills: 0 | Status: SHIPPED | OUTPATIENT
Start: 2022-12-22

## 2022-12-26 DIAGNOSIS — J45.41 MODERATE PERSISTENT ASTHMA WITH ACUTE EXACERBATION: ICD-10-CM

## 2022-12-26 RX ORDER — ALBUTEROL SULFATE 90 UG/1
AEROSOL, METERED RESPIRATORY (INHALATION)
Qty: 8.5 G | Refills: 1 | Status: SHIPPED | OUTPATIENT
Start: 2022-12-26

## 2023-01-13 ENCOUNTER — PATIENT MESSAGE (OUTPATIENT)
Dept: OBGYN CLINIC | Age: 73
End: 2023-01-13

## 2023-01-20 ENCOUNTER — OFFICE VISIT (OUTPATIENT)
Dept: OBGYN CLINIC | Age: 73
End: 2023-01-20

## 2023-01-20 VITALS
DIASTOLIC BLOOD PRESSURE: 77 MMHG | HEIGHT: 68 IN | SYSTOLIC BLOOD PRESSURE: 145 MMHG | HEART RATE: 92 BPM | WEIGHT: 187 LBS | BODY MASS INDEX: 28.34 KG/M2

## 2023-01-20 DIAGNOSIS — N93.0 POSTCOITAL BLEEDING: Primary | ICD-10-CM

## 2023-01-20 DIAGNOSIS — R10.2 PELVIC PAIN IN FEMALE: ICD-10-CM

## 2023-01-20 RX ORDER — ESTRADIOL 10 UG/1
10 INSERT VAGINAL
Qty: 36 TABLET | Refills: 0 | Status: SHIPPED | OUTPATIENT
Start: 2023-01-23 | End: 2023-04-23

## 2023-01-20 NOTE — PROGRESS NOTES
Rooming note, gyn problem visit:    Chief Complaint   Patient presents with    Post Menopausal Bleeding       Mckinley Nevarez is a 74 y.o. female presents for a problem visit. Patient's last menstrual period was 03/15/1998. Birth Control: post menopausal status. Last Pap: approximate date 12/2022 and was normal  Last or next WWE is: 12/2022    The patient is reporting having: heavy bleeding after intercourse for the past 4 times she has had intercourse, the bleeding lasts about one day. Pt reports pain during intercourse & outside of intercourse. Pt reports abdominal/low back pain  Ablation scheduled for next week per pt  Possible endometrial biopsy today      This is not a new problem. She has experienced this problem before. She reports the symptoms are is unchanged. Aggravating factors include none. And alleviating factors include none. She does not have other concerns. Ultrasound today  DIFFICULT SCAN DUE TO PATIENT BODY HABITUS. TA AND TV SCANS PERFORMED FOR BEST VISUALIZATION. UTERUS IS ANTEVERTED, NORMAL IN SIZE AND ECHOGENICITY. ENDOMETRIUM MEASURES 2-3MM IN THICKNESS. NO EVIDENCE OF MASSES OR ABNORMALITIES ARE SEEN. RIGHT OVARY NOT VISUALIZED. RIGHT ADNEXA APPEARS WITHIN NORMAL LIMITS. LEFT OVARY NOT VISUALIZED. LEFT ADNEXA APPEARS WITHIN NORMAL LIMITS. NO FREE FLUID SEEN IN THE CDS. 1. Have you been to the ER, urgent care clinic, or hospitalized since your last visit? No    2. Have you seen or consulted any other health care providers outside of the [address] [place] [address] since your last visit?  No

## 2023-01-20 NOTE — PROGRESS NOTES
[facility]  [email or web address]"[employer]/  [phone]    Kody Johnson MD, [facility]       OB/GYN Problem visit      HPI  Belle Nyhan is a , 74 y.o. female who presents for a problem visit. Chief Complaint   Patient presents with    Post Menopausal Bleeding       Bleeding after intercourse and with replens insert (on left side of applicator). No ho HRT. Also had some pain and was worried about her ovaries. Having spinal ablation. Per Rooming Note:  Belle Nyhan is a 74 y.o. female presents for a problem visit. Patient's last menstrual period was 03/15/1998. Birth Control: post menopausal status. Last Pap: approximate date 2022 and was normal  Last or next WWE is: 2022     The patient is reporting having: heavy bleeding after intercourse for the past 4 times she has had intercourse, the bleeding lasts about one day. Pt reports pain during intercourse & outside of intercourse. Pt reports abdominal/low back pain  Ablation scheduled for next week per pt  Possible endometrial biopsy today        This is not a new problem. She has experienced this problem before. She reports the symptoms are is unchanged. Aggravating factors include none. And alleviating factors include none. She does not have other concerns. Ultrasound today  DIFFICULT SCAN DUE TO PATIENT BODY HABITUS. TA AND TV SCANS PERFORMED FOR BEST VISUALIZATION. UTERUS IS ANTEVERTED, NORMAL IN SIZE AND ECHOGENICITY. ENDOMETRIUM MEASURES 2-3MM IN THICKNESS. NO EVIDENCE OF MASSES OR ABNORMALITIES ARE SEEN. RIGHT OVARY NOT VISUALIZED. RIGHT ADNEXA APPEARS WITHIN NORMAL LIMITS. LEFT OVARY NOT VISUALIZED. LEFT ADNEXA APPEARS WITHIN NORMAL LIMITS. NO FREE FLUID SEEN IN THE CDS.        Sexual history and Contraception:  Social History     Substance and Sexual Activity   Sexual Activity Yes    Partners: Male       Past Medical History:   Diagnosis Date    Arthritis     Asthma     Back pain Elevated lipids 09/15/2015    Essential hypertension 09/15/2015    Hypercholesterolemia     Hypothyroidism, adult 09/15/2015    Migraine     allergy mediated in sping and fall    Pap smear for cervical cancer screening 2022    normal    Sun-damaged skin      Current Outpatient Medications   Medication Sig    [START ON 2023] estradioL (VAGIFEM) 10 mcg tab vaginal tablet Insert 1 Tablet into vagina every Monday and Thursday for 90 days. Start 1 tab PV every day x two weeks. Then BIW    furosemide (LASIX) 20 mg tablet Take 1 Tablet by mouth daily as needed (bloat). albuterol (PROVENTIL HFA, VENTOLIN HFA, PROAIR HFA) 90 mcg/actuation inhaler INHALE TWO PUFFS BY MOUTH EVERY 4 HOURS AS NEEDED FOR WHEEZING OR FOR SHORTNESS OF BREATH    fluticasone propion-salmeteroL (Advair Diskus) 250-50 mcg/dose diskus inhaler Take 1 Puff by inhalation every twelve (12) hours. losartan (COZAAR) 50 mg tablet Take 1 Tablet by mouth daily. montelukast (SINGULAIR) 10 mg tablet TAKE ONE TABLET BY MOUTH DAILY    levothyroxine (SYNTHROID) 75 mcg tablet Take 1 Tablet by mouth Daily (before breakfast). rosuvastatin (CRESTOR) 20 mg tablet Take 1/2 tablet for 2 weeks then 1 tablet p.o. daily    escitalopram oxalate (LEXAPRO) 20 mg tablet Take 1 Tablet by mouth daily. predniSONE (DELTASONE) 20 mg tablet Take 2 Tablets by mouth daily (with breakfast). For 1 week then take 1/2 tab for 4 days     No current facility-administered medications for this visit.      OB History    Para Term  AB Living   1 1 1         SAB IAB Ectopic Molar Multiple Live Births             1      # Outcome Date GA Lbr [place]/2nd Weight Sex Delivery Anes PTL Lv   1 Term              Past Surgical History:   Procedure Laterality Date    COLONOSCOPY N/A 10/04/2019    COLONOSCOPY performed by Welby Severance, MD at [address] [place] [address] ENDOSCOPY    HX BREAST BIOPSY Left yrs ago    neg; surgical bx    HX  SECTION      HX CHOLECYSTECTOMY      HX CYST INCISION AND DRAINAGE Left over years    neg    HX GYN  2019    prolapse, mesh, Dr. Russell Gastelum ARTHROSCOPY Right     x 2    HX KNEE REPLACEMENT Right 01/28/2020    HX SHOULDER ARTHROSCOPY Left     HX TONSILLECTOMY       Family History   Problem Relation Age of Onset    Mult Sclerosis Mother     Cancer Mother         uterus/skin cancer    Cancer Father 72        colon    Parkinson's Disease Sister 61    Mult Sclerosis Brother     Parkinson's Disease Brother      Social History     Socioeconomic History    Marital status:      Spouse name: Not on file    Number of children: Not on file    Years of education: Not on file    Highest education level: Not on file   Occupational History    Not on file   Tobacco Use    Smoking status: Never    Smokeless tobacco: Never   Substance and Sexual Activity    Alcohol use: Yes     Alcohol/week: 5.8 standard drinks     Types: 7 Glasses of wine per week    Drug use: No    Sexual activity: Yes     Partners: Male   Other Topics Concern    Not on file   Social History Narrative    Retired from [employer] in [place]    Stressful        Hx of situational stress     passed cancer duodenal    Mother was sick 79 yo with MS actually did well        Recently   In March 2017        1 son, doing well     Social Determinants of Health     Financial Resource Strain: Not on file   Food Insecurity: Not on file   Transportation Needs: Not on file   Physical Activity: Not on file   Stress: Not on file   Social Connections: Not on file   Intimate Partner Violence: Not on file   Housing Stability: Not on file     Tobacco History:  reports that she has never smoked. She has never used smokeless tobacco.  Alcohol Abuse:  reports current alcohol use of about 5.8 standard drinks per week. Drug Abuse:  reports no history of drug use.   Allergies   Allergen Reactions    Sulfa (Sulfonamide Antibiotics) Anaphylaxis       Patient Active Problem List   Diagnosis Code Hypothyroidism, adult E03.9    Essential hypertension I10    Elevated lipids E78.5    Skin lesions, generalized L98.9    Asthma J45.909    [facility] ([facility] F33.9             Review of Systems: History obtained from the patient  Constitutional: negative for weight loss, fever, night sweats  Breast: negative for breast lumps, nipple discharge, galactorrhea  GI: negative for change in bowel habits, abdominal pain, black or bloody stools  : see HPI   MSK: negative for back pain, joint pain, muscle pain  Skin: negative for itching, rash, hives  Psych: negative for anxiety, depression, change in mood      Objective:  Visit Vitals  BP (!) 145/77   Pulse 92   Ht 5' 8\" (1.727 m)   Wt 187 lb (84.8 kg)   LMP 03/15/1998   BMI 28.43 kg/m²       PHYSICAL EXAMINATION:  GENERAL: alert, well appearing, and in no distress  HEAD: normocephalic, atraumatic. ABDOMEN: soft, nontender, nondistended, no masses or organomegaly   EGBUS: no lesions, no inflammation, no masses, atrophic changes  VULVA: normal appearing vulva with no masses, tenderness or lesions  VAGINA: normal appearing vagina with normal color, no lesions,  no  discharge  CERVIX: normal appearing cervix without discharge or lesions, non tender  UTERUS: uterus is normal size, shape, consistency and nontender   ADNEXA: normal adnexa in size, nontender and no masses  NEURO: alert, grossly oriented to place and time, normal speech,       ASSESSMENT:    ICD-10-CM ICD-9-CM    1. Postcoital bleeding  N93.0 626.7       2. Pelvic pain in female  R10.2 625.9           PLAN:  Orders Placed This Encounter    estradioL (VAGIFEM) 10 mcg tab vaginal tablet     Sig: Insert 1 Tablet into vagina every Monday and Thursday for 90 days. Start 1 tab PV every day x two weeks. Then BIW     Dispense:  36 Tablet     Refill:  0       The patient was instructed to follow up as needed if symptoms persist or worsen.   Instructions were given to patient and the patient was given the opportunity to ask any questions concerning the visit today. The patient should keep/make her routine well woman exam.  The patient should contact our office with any questions or concerns. US reviewed, endo bx deferred due to thin endometrium  We discussed potential causes of lower abdominal/pelvic pain: GYN, GI, , musculoskeletal, infectious process, adhesions, or other etiology  We discussed evaluation of lower abdominal/pelvic pain: including but not limited to observation, surgical evaluation/laparoscopy, imaging   We discussed treatment of lower abdominal/pelvic pain: including but not limited to: pain medication, hormonal management, surgical intervention, bowel regimen. Since pain can be a symptoms of an underlying abnormal process she is encouraged to contact my office with persistent symptoms for additional evaluation and treatment if needed. Trial of vaginal estrogen, disc how to insert rx sent. We discussed elevated blood pressure reading. I recommend BP log/home BP cuff and PCP follow up for additional management/surveillance. Med fu 3mos, check sx  Discussed avoiding painful intercourse, generous lubrication and vaginal moisturiser for at lest three months  FU three months if NI  Discussed risks, benefits and alternatives of HRT: including but not limited to dvt/pe/mi/cva/ca/gi risks. We discussed non-hormonal alternatives to HRT and management of symptoms. Disc lower risk with vaginal estrogen    Physician review of ultrasound performed by technician    The ultrasound report and images were reviewed and discussed with the patient at her visit.   Please see images and imaging report entered by technician in PACS for more detail and progress note and diagnosis entered by MD.    Paul Perez MD    Today, 01/20/23, I personally spent more than 20 minutes in review of records, documentation,  and face to face counseling with the patient discussing the diagnosis, plan of care and importance of compliance with the treatment plan and performing an exam as well as ordering any appropropriate labs, procedures, or medications. No results found for this visit on 01/20/23.

## 2023-01-23 DIAGNOSIS — E03.9 ACQUIRED HYPOTHYROIDISM: Primary | ICD-10-CM

## 2023-02-27 DIAGNOSIS — E03.9 ACQUIRED HYPOTHYROIDISM: ICD-10-CM

## 2023-02-27 LAB — TSH SERPL DL<=0.05 MIU/L-ACNC: 2.55 UIU/ML (ref 0.36–3.74)

## 2023-03-01 ENCOUNTER — OFFICE VISIT (OUTPATIENT)
Dept: INTERNAL MEDICINE CLINIC | Age: 73
End: 2023-03-01
Payer: MEDICARE

## 2023-03-01 ENCOUNTER — TELEPHONE (OUTPATIENT)
Dept: OBGYN CLINIC | Age: 73
End: 2023-03-01

## 2023-03-01 VITALS
TEMPERATURE: 97.7 F | HEART RATE: 88 BPM | HEIGHT: 68 IN | SYSTOLIC BLOOD PRESSURE: 143 MMHG | RESPIRATION RATE: 18 BRPM | DIASTOLIC BLOOD PRESSURE: 82 MMHG | OXYGEN SATURATION: 97 % | BODY MASS INDEX: 27.89 KG/M2 | WEIGHT: 184 LBS

## 2023-03-01 DIAGNOSIS — J04.0 LARYNGITIS: Primary | ICD-10-CM

## 2023-03-01 PROCEDURE — G8399 PT W/DXA RESULTS DOCUMENT: HCPCS | Performed by: INTERNAL MEDICINE

## 2023-03-01 PROCEDURE — G9717 DOC PT DX DEP/BP F/U NT REQ: HCPCS | Performed by: INTERNAL MEDICINE

## 2023-03-01 PROCEDURE — 1090F PRES/ABSN URINE INCON ASSESS: CPT | Performed by: INTERNAL MEDICINE

## 2023-03-01 PROCEDURE — G8536 NO DOC ELDER MAL SCRN: HCPCS | Performed by: INTERNAL MEDICINE

## 2023-03-01 PROCEDURE — 1101F PT FALLS ASSESS-DOCD LE1/YR: CPT | Performed by: INTERNAL MEDICINE

## 2023-03-01 PROCEDURE — G8417 CALC BMI ABV UP PARAM F/U: HCPCS | Performed by: INTERNAL MEDICINE

## 2023-03-01 PROCEDURE — G8427 DOCREV CUR MEDS BY ELIG CLIN: HCPCS | Performed by: INTERNAL MEDICINE

## 2023-03-01 PROCEDURE — G0463 HOSPITAL OUTPT CLINIC VISIT: HCPCS | Performed by: INTERNAL MEDICINE

## 2023-03-01 PROCEDURE — 99214 OFFICE O/P EST MOD 30 MIN: CPT | Performed by: INTERNAL MEDICINE

## 2023-03-01 PROCEDURE — 3017F COLORECTAL CA SCREEN DOC REV: CPT | Performed by: INTERNAL MEDICINE

## 2023-03-01 RX ORDER — DOXYCYCLINE 100 MG/1
100 CAPSULE ORAL 2 TIMES DAILY
COMMUNITY
Start: 2023-02-23 | End: 2023-03-04

## 2023-03-01 NOTE — PROGRESS NOTES
Note   Chief Complaint   laryngitis    Nellie Castro is a 74 y.o. female     1. Laryngitis   Got sick at end of vacation in [place] end of Jan  Left throat pain, left ear pain, lost her voice  Given prednisone and augmentin by allergist, didn't help  [facility]   Note reviewed, CBC reviewed  CXR clear per report  Was given doxycycline   Somewhat improved, getting voice back  Cough improving  Developed thrush  No wheezing   Thrush better on exam today   Improving. Continue doxycycline, call if no improvement      Benefits, risks, possible drug interactions, and side effects of all new medications were reviewed with the patient. Pt verbalized understanding. Return to clinic:  as needed    An electronic signature was used to authenticate this note. Uday Rosenberg MD  [facility]  3/1/2023    Future Appointments   Date Time Provider Charles Roberts   4/21/2023  9:20 AM Jas Watts MD [facility]        Objective   Vitals:       Visit Vitals  BP (!) 143/82 (BP 1 Location: Left upper arm, BP Patient Position: Sitting, BP Cuff Size: Adult)   Pulse 88   Temp 97.7 °F (36.5 °C) (Oral)   Resp 18   Ht 5' 8\" (1.727 m)   Wt 184 lb (83.5 kg)   LMP 03/15/1998   SpO2 97%   BMI 27.98 kg/m²        Physical Exam  Constitutional:       Appearance: Normal appearance. She is not ill-appearing. HENT:      Mouth/Throat:      Pharynx: No posterior oropharyngeal erythema. Comments: Hoarse voice, no thrush noted  Cardiovascular:      Rate and Rhythm: Normal rate and regular rhythm. Heart sounds: No murmur heard. No friction rub. No gallop. Pulmonary:      Effort: No respiratory distress. Breath sounds: Normal breath sounds. No wheezing, rhonchi or rales. Neurological:      Mental Status: She is alert. Current Outpatient Medications   Medication Sig    doxycycline (VIBRAMYCIN) 100 mg capsule Take 100 mg by mouth two (2) times a day. levothyroxine (SYNTHROID) 75 mcg tablet TAKE ONE TABLET BY MOUTH EVERY MORNING BEFORE BREAKFAST    albuterol (PROVENTIL HFA, VENTOLIN HFA, PROAIR HFA) 90 mcg/actuation inhaler INHALE TWO PUFFS BY MOUTH EVERY 4 HOURS AS NEEDED FOR WHEEZING OR FOR SHORTNESS OF BREATH    fluticasone propion-salmeteroL (Advair Diskus) 250-50 mcg/dose diskus inhaler Take 1 Puff by inhalation every twelve (12) hours. losartan (COZAAR) 50 mg tablet Take 1 Tablet by mouth daily. montelukast (SINGULAIR) 10 mg tablet TAKE ONE TABLET BY MOUTH DAILY    escitalopram oxalate (LEXAPRO) 20 mg tablet Take 1 Tablet by mouth daily. (Patient taking differently: Take 5 mg by mouth daily.)    estradioL (VAGIFEM) 10 mcg tab vaginal tablet Insert 1 Tablet into vagina every Monday and Thursday for 90 days. Start 1 tab PV every day x two weeks. Then BIW    furosemide (LASIX) 20 mg tablet Take 1 Tablet by mouth daily as needed (bloat). rosuvastatin (CRESTOR) 20 mg tablet Take 1/2 tablet for 2 weeks then 1 tablet p.o. daily (Patient not taking: Reported on 3/1/2023)     No current facility-administered medications for this visit.

## 2023-03-01 NOTE — PROGRESS NOTES
Room:  Identified pt with two pt identifiers(name and ). Reviewed record in preparation for visit and have obtained necessary documentation. Chief Complaint   Patient presents with    Pneumonia        Vitals:    23 0816   BP: (!) 143/82   Pulse: 88   Resp: 18   Temp: 97.7 °F (36.5 °C)   TempSrc: Oral   SpO2: 97%   Weight: 184 lb (83.5 kg)   Height: 5' 8\" (1.727 m)   PainSc:   3   PainLoc: Throat   LMP: 03/15/1998       Health Maintenance Due   Topic    COVID-19 Vaccine (4 - Booster for Gilbert Peter series)    Flu Vaccine (1)    Medicare Yearly Exam        1. \"Have you been to the ER, urgent care clinic since your last visit? Hospitalized since your last visit? \" [facility]     2. \"Have you seen or consulted any other health care providers outside of the [address] [place] [address] since your last visit? \" Yes [facility]      3. For patients over 45: Has the patient had a colonoscopy? NA - based on age     If the patient is female:    4. For patients over 36: Has the patient had a mammogram? NA - based on age    11. For patients over 21: Has the patient had a pap smear? NA - based on age    Current Outpatient Medications   Medication Instructions    albuterol (PROVENTIL HFA, VENTOLIN HFA, PROAIR HFA) 90 mcg/actuation inhaler INHALE TWO PUFFS BY MOUTH EVERY 4 HOURS AS NEEDED FOR WHEEZING OR FOR SHORTNESS OF BREATH    doxycycline (VIBRAMYCIN) 100 mg, Oral, 2 TIMES DAILY    escitalopram oxalate (LEXAPRO) 20 mg, Oral, DAILY    estradioL (VAGIFEM) 10 mcg, Vaginal, 2 TIMES A WEEK (MON & THUR), Start 1 tab PV every day x two weeks.  Then BIW    fluticasone propion-salmeteroL (Advair Diskus) 250-50 mcg/dose diskus inhaler 1 Puff, Inhalation, EVERY 12 HOURS    furosemide (LASIX) 20 mg, Oral, DAILY AS NEEDED    levothyroxine (SYNTHROID) 75 mcg tablet TAKE ONE TABLET BY MOUTH EVERY MORNING BEFORE BREAKFAST    losartan (COZAAR) 50 mg, Oral, DAILY    montelukast (SINGULAIR) 10 mg tablet TAKE ONE TABLET BY MOUTH DAILY    rosuvastatin (CRESTOR) 20 mg tablet Take 1/2 tablet for 2 weeks then 1 tablet p.o. daily       Allergies   Allergen Reactions    Sulfa (Sulfonamide Antibiotics) Anaphylaxis       Immunization History   Administered Date(s) Administered    COVID-19, MODERNA Booster BLUE border, (age 18y+), IM, 50mcg/0.25mL 01/09/2022    COVID-19, PFIZER PURPLE top, DILUTE for use, (age 15 y+), IM, 30mcg/0.3mL 02/28/2021, 03/21/2021    Influenza Vaccine 09/15/2015, 07/18/2019, 08/17/2019    Pneumococcal Conjugate (PCV-13) 10/04/2016    Pneumococcal Polysaccharide (PPSV-23) 10/07/2019    Zoster Recombinant 03/16/2019, 04/08/2019, 06/08/2019       Past Medical History:   Diagnosis Date    Arthritis     Asthma     Back pain     Elevated lipids 09/15/2015    Essential hypertension 09/15/2015    Hypercholesterolemia     Hypothyroidism, adult 09/15/2015    Migraine     allergy mediated in sping and fall    Pap smear for cervical cancer screening 12/01/2022    normal    Sun-damaged skin

## 2023-03-02 RX ORDER — ESTRADIOL 10 UG/1
10 INSERT VAGINAL
Qty: 36 TABLET | Refills: 0 | Status: SHIPPED | OUTPATIENT
Start: 2023-03-02 | End: 2023-05-31

## 2023-03-02 NOTE — TELEPHONE ENCOUNTER
74year old patient lasts seen in the office on 12/1/2022 for ae and problem visit on 1/20/23      Patient has follow up on 4/21/2023    Please advise regarding the refill pended    Please amend/sign    Thank you

## 2023-03-17 DIAGNOSIS — F32.89 OTHER DEPRESSION: ICD-10-CM

## 2023-03-17 RX ORDER — ZOLPIDEM TARTRATE 5 MG/1
TABLET ORAL
Qty: 20 TABLET | Refills: 0 | Status: SHIPPED | OUTPATIENT
Start: 2023-03-17

## 2023-04-01 RX ORDER — LOSARTAN POTASSIUM 50 MG/1
TABLET ORAL
Qty: 90 TABLET | Refills: 0 | Status: SHIPPED | OUTPATIENT
Start: 2023-04-01

## 2023-04-20 ENCOUNTER — PATIENT MESSAGE (OUTPATIENT)
Dept: OBGYN CLINIC | Age: 73
End: 2023-04-20

## 2023-04-21 ENCOUNTER — OFFICE VISIT (OUTPATIENT)
Dept: OBGYN CLINIC | Age: 73
End: 2023-04-21

## 2023-04-21 VITALS
HEIGHT: 68 IN | BODY MASS INDEX: 28.01 KG/M2 | WEIGHT: 184.8 LBS | DIASTOLIC BLOOD PRESSURE: 87 MMHG | SYSTOLIC BLOOD PRESSURE: 134 MMHG | HEART RATE: 90 BPM

## 2023-04-21 DIAGNOSIS — N93.0 PCB (POST COITAL BLEEDING): ICD-10-CM

## 2023-04-21 DIAGNOSIS — Z79.890 ENCOUNTER FOR MONITORING POSTMENOPAUSAL ESTROGEN REPLACEMENT THERAPY: Primary | ICD-10-CM

## 2023-04-21 DIAGNOSIS — Z51.81 ENCOUNTER FOR MONITORING POSTMENOPAUSAL ESTROGEN REPLACEMENT THERAPY: Primary | ICD-10-CM

## 2023-04-21 RX ORDER — ESTRADIOL 10 UG/1
10 INSERT VAGINAL
Qty: 36 TABLET | Refills: 2 | Status: SHIPPED | OUTPATIENT
Start: 2023-04-24 | End: 2023-07-23

## 2023-04-21 NOTE — PROGRESS NOTES
[facility]  [email or web address]  [phone]    Lupe Gómez MD, [address] [facility]       OB/GYN Problem visit      HPI  Saqib Burrows is a , 74 y.o. female who presents for a problem visit. Chief Complaint   Patient presents with    Follow-up       Doing better, likes ERT, +SA this weekend no PCB. Did not need/use lubrication. Per Rooming Note:  Saqib Burrows is a 74 y.o. female presents for a follow up visit. Patient's last menstrual period was 03/15/1998. Birth Control: post menopausal status. Last Pap: approximate date 2022 and was normal  Last or next [address] is: 2022     The patient is here for fu after starting estradiol to stop vaginal bleeding after intercourse. Pt reports it has improved        She does not have other concerns. Sexual history and Contraception:  Social History     Substance and Sexual Activity   Sexual Activity Yes    Partners: Male       Past Medical History:   Diagnosis Date    Arthritis     Asthma     Back pain     Elevated lipids 09/15/2015    Essential hypertension 09/15/2015    Hypercholesterolemia     Hypothyroidism, adult 09/15/2015    Migraine     allergy mediated in sping and fall    Pap smear for cervical cancer screening 2022    normal    Sun-damaged skin      Current Outpatient Medications   Medication Sig    [START ON 2023] estradioL (VAGIFEM) 10 mcg tab vaginal tablet Insert 1 Tablet into vagina every Monday and Thursday for 90 days. Start 1 tab PV every day x two weeks. Then BIW    losartan (COZAAR) 50 mg tablet TAKE ONE TABLET BY MOUTH DAILY    zolpidem (AMBIEN) 5 mg tablet TAKE ONE TABLET BY MOUTH ONCE NIGHTLY AS NEEDED FOR SLEEP    montelukast (SINGULAIR) 10 mg tablet Take 1 Tablet by mouth daily. levothyroxine (SYNTHROID) 75 mcg tablet Take 1 Tablet by mouth Daily (before breakfast).     albuterol (PROVENTIL HFA, VENTOLIN HFA, PROAIR HFA) 90 mcg/actuation inhaler INHALE TWO PUFFS BY MOUTH EVERY 4 HOURS AS NEEDED FOR WHEEZING OR FOR SHORTNESS OF BREATH    fluticasone propion-salmeteroL (Advair Diskus) 250-50 mcg/dose diskus inhaler Take 1 Puff by inhalation every twelve (12) hours. escitalopram oxalate (LEXAPRO) 20 mg tablet Take 1 Tablet by mouth daily. (Patient taking differently: Take 5 mg by mouth daily.)    furosemide (LASIX) 20 mg tablet Take 1 Tablet by mouth daily as needed (bloat). rosuvastatin (CRESTOR) 20 mg tablet Take 1/2 tablet for 2 weeks then 1 tablet p.o. daily (Patient not taking: Reported on 3/1/2023)     No current facility-administered medications for this visit. OB History    Para Term  AB Living   1 1 1         SAB IAB Ectopic Molar Multiple Live Births             1      # Outcome Date GA Lbr [place]/2nd Weight Sex Delivery Anes PTL Lv   1 Term              Past Surgical History:   Procedure Laterality Date    COLONOSCOPY N/A 10/04/2019    COLONOSCOPY performed by Paramjit Muñiz MD at [facility] ENDOSCOPY    HX BREAST BIOPSY Left yrs ago    neg; surgical bx    HX  SECTION      HX CHOLECYSTECTOMY      HX CYST INCISION AND DRAINAGE Left over years    neg    HX GYN  2019    prolapse, mesh, Dr. David Gonzalez ARTHROSCOPY Right     x 2    HX KNEE REPLACEMENT Right 2020    HX SHOULDER ARTHROSCOPY Left     HX TONSILLECTOMY       Family History   Problem Relation Age of Onset    Mult Sclerosis Mother     Cancer Mother         uterus/skin cancer    Cancer Father 72        colon    Parkinson's Disease Sister 61    Mult Sclerosis Brother     Parkinson's Disease Brother      Social History     Socioeconomic History    Marital status:      Spouse name: Not on file    Number of children: Not on file    Years of education: Not on file    Highest education level: Not on file   Occupational History    Not on file   Tobacco Use    Smoking status: Never    Smokeless tobacco: Never   Substance and Sexual Activity    Alcohol use:  Yes     Alcohol/week: 5.8 standard drinks Types: 7 Glasses of wine per week    Drug use: No    Sexual activity: Yes     Partners: Male   Other Topics Concern    Not on file   Social History Narrative    Retired from [employer] in [place]    Stressful        Hx of situational stress     passed cancer duodenal    Mother was sick 81 yo with MS actually did well        Recently   In March 2017        1 son, doing well     Social Determinants of Health     Financial Resource Strain: Not on file   Food Insecurity: Not on file   Transportation Needs: Not on file   Physical Activity: Not on file   Stress: Not on file   Social Connections: Not on file   Intimate Partner Violence: Not on file   Housing Stability: Not on file     Tobacco History:  reports that she has never smoked. She has never used smokeless tobacco.  Alcohol Abuse:  reports current alcohol use of about 5.8 standard drinks per week. Drug Abuse:  reports no history of drug use. Allergies   Allergen Reactions    Sulfa (Sulfonamide Antibiotics) Anaphylaxis       Patient Active Problem List   Diagnosis Code    Hypothyroidism, adult E03.9    Essential hypertension I10    Elevated lipids E78.5    Skin lesions, generalized L98.9    Asthma J45.909    Recurrent depression ([facility] [address] F33.9             Review of Systems: History obtained from the patient  Constitutional: negative for weight loss, fever, night sweats  Breast: negative for breast lumps, nipple discharge, galactorrhea  GI: negative for change in bowel habits, abdominal pain, black or bloody stools  : see HPI   MSK: negative for back pain, joint pain, muscle pain  Skin: negative for itching, rash, hives  Psych: negative for anxiety, depression, change in mood      Objective:  Visit Vitals  /87   Pulse 90   Ht 5' 8\" (1.727 m)   Wt 184 lb 12.8 oz (83.8 kg)   LMP 03/15/1998   BMI 28.10 kg/m²       PHYSICAL EXAMINATION:  GENERAL: alert, well appearing, and in no distress  HEAD: normocephalic, atraumatic.    PELVIC: declined  NEURO: alert, grossly oriented to place and time, normal speech,       ASSESSMENT:    ICD-10-CM ICD-9-CM    1. Encounter for monitoring postmenopausal estrogen replacement therapy  Z51.81 V58.83     Z79.890        2. PCB (post coital bleeding)  N93.0 626.7     resolved          PLAN:  Orders Placed This Encounter    estradioL (VAGIFEM) 10 mcg tab vaginal tablet     Sig: Insert 1 Tablet into vagina every Monday and Thursday for 90 days. Start 1 tab PV every day x two weeks. Then BIW     Dispense:  36 Tablet     Refill:  2     Disc rba of HRT and dec risk with topical estrogen  Pt would like to continue. Refill   Fu wwe  Notify MD if  bleeding recurs  Disc lubrication use prn    The patient was instructed to follow up as needed if symptoms persist or worsen. Instructions were given to patient and the patient was given the opportunity to ask any questions concerning the visit today. The patient should keep/make her routine well woman exam.  The patient should contact our office with any questions or concerns. No results found for this visit on 04/21/23.

## 2023-05-18 RX ORDER — FLUTICASONE PROPIONATE AND SALMETEROL 250; 50 UG/1; UG/1
1 POWDER RESPIRATORY (INHALATION) EVERY 12 HOURS
COMMUNITY
Start: 2022-12-22 | End: 2023-06-02

## 2023-05-18 RX ORDER — ALBUTEROL SULFATE 90 UG/1
AEROSOL, METERED RESPIRATORY (INHALATION)
COMMUNITY
Start: 2022-12-26

## 2023-05-18 RX ORDER — ESTRADIOL 10 UG/1
10 INSERT VAGINAL
COMMUNITY
Start: 2023-04-24 | End: 2023-06-19

## 2023-05-18 RX ORDER — MONTELUKAST SODIUM 10 MG/1
10 TABLET ORAL DAILY
COMMUNITY
Start: 2023-03-01

## 2023-05-18 RX ORDER — ZOLPIDEM TARTRATE 5 MG/1
1 TABLET ORAL NIGHTLY PRN
COMMUNITY
Start: 2023-03-17 | End: 2023-06-02

## 2023-05-18 RX ORDER — LEVOTHYROXINE SODIUM 0.07 MG/1
75 TABLET ORAL
COMMUNITY
Start: 2023-03-01

## 2023-05-18 RX ORDER — LOSARTAN POTASSIUM 50 MG/1
1 TABLET ORAL DAILY
COMMUNITY
Start: 2023-04-01 | End: 2023-06-27

## 2023-05-31 DIAGNOSIS — F51.01 PRIMARY INSOMNIA: Primary | ICD-10-CM

## 2023-06-02 RX ORDER — ZOLPIDEM TARTRATE 5 MG/1
TABLET ORAL
Qty: 20 TABLET | Refills: 0 | Status: SHIPPED | OUTPATIENT
Start: 2023-06-02 | End: 2023-08-31

## 2023-06-02 RX ORDER — FLUTICASONE PROPIONATE AND SALMETEROL 250; 50 UG/1; UG/1
POWDER RESPIRATORY (INHALATION)
Qty: 20 EACH | Refills: 0 | Status: SHIPPED | OUTPATIENT
Start: 2023-06-02

## 2023-06-17 ENCOUNTER — TELEPHONE (OUTPATIENT)
Age: 73
End: 2023-06-17

## 2023-06-19 ENCOUNTER — TELEPHONE (OUTPATIENT)
Age: 73
End: 2023-06-19

## 2023-06-19 RX ORDER — ESTRADIOL 10 UG/1
10 INSERT VAGINAL
Qty: 24 TABLET | Refills: 1 | Status: SHIPPED | OUTPATIENT
Start: 2023-06-19 | End: 2023-09-17

## 2023-06-19 RX ORDER — ROSUVASTATIN CALCIUM 20 MG/1
TABLET, COATED ORAL
Qty: 90 TABLET | Refills: 0 | Status: SHIPPED | OUTPATIENT
Start: 2023-06-19

## 2023-06-19 NOTE — TELEPHONE ENCOUNTER
74year old patient last seen in the office on 12/1/2022 for ae and was seen in office for problem visit on 4/21/2023      Please advise regarding requested refill   ? Ok to refill till next ae 12/27/2023    Please update signature and refill till ae   ?  Thank you

## 2023-06-20 DIAGNOSIS — F51.01 PRIMARY INSOMNIA: Primary | ICD-10-CM

## 2023-06-20 RX ORDER — GABAPENTIN 100 MG/1
CAPSULE ORAL
Qty: 90 CAPSULE | Refills: 0 | Status: SHIPPED | OUTPATIENT
Start: 2023-06-20 | End: 2023-10-18

## 2023-06-27 RX ORDER — LOSARTAN POTASSIUM 50 MG/1
TABLET ORAL
Qty: 90 TABLET | Refills: 0 | Status: SHIPPED | OUTPATIENT
Start: 2023-06-27

## 2023-07-09 RX ORDER — FLUTICASONE PROPIONATE AND SALMETEROL 250; 50 UG/1; UG/1
POWDER RESPIRATORY (INHALATION)
Qty: 1 EACH | Refills: 0 | Status: SHIPPED | OUTPATIENT
Start: 2023-07-09

## 2023-08-04 ENCOUNTER — TELEPHONE (OUTPATIENT)
Age: 73
End: 2023-08-04

## 2023-08-04 NOTE — TELEPHONE ENCOUNTER
Spoke with patient and she reports for the last week dizzy spells that are worsening. She has not checked her B/P's . mild headaches and does have blurred vision with the dizziness. Denies any falls from resulting dizziness. She denies loosing any consciousness. She reports that she walks 3 miles a day with out incident. Denies any medication changes. No sinusitis and no ear pain. She has had a eye exam with in the last 6 months. Advised to monitor her B/P's and bring with her to the appt. Scheduled with KATHRINE Fisher on 8/8/23. Advised if her symptoms should worsen or any other symptoms appear prior to her appt  to go to urgent care for evaluation and treatment. She states understanding and grateful for the call.

## 2023-08-08 ENCOUNTER — OFFICE VISIT (OUTPATIENT)
Age: 73
End: 2023-08-08
Payer: MEDICARE

## 2023-08-08 VITALS
SYSTOLIC BLOOD PRESSURE: 112 MMHG | RESPIRATION RATE: 16 BRPM | HEIGHT: 68 IN | BODY MASS INDEX: 27.81 KG/M2 | DIASTOLIC BLOOD PRESSURE: 76 MMHG | TEMPERATURE: 97.7 F | HEART RATE: 77 BPM | OXYGEN SATURATION: 95 % | WEIGHT: 183.5 LBS

## 2023-08-08 DIAGNOSIS — R53.83 FATIGUE, UNSPECIFIED TYPE: ICD-10-CM

## 2023-08-08 DIAGNOSIS — E78.2 MIXED HYPERLIPIDEMIA: ICD-10-CM

## 2023-08-08 DIAGNOSIS — H53.8 BLURRY VISION: ICD-10-CM

## 2023-08-08 DIAGNOSIS — R42 DIZZINESS AND GIDDINESS: Primary | ICD-10-CM

## 2023-08-08 DIAGNOSIS — I10 ESSENTIAL HYPERTENSION: ICD-10-CM

## 2023-08-08 DIAGNOSIS — I44.0 FIRST DEGREE AV BLOCK: ICD-10-CM

## 2023-08-08 DIAGNOSIS — F33.9 RECURRENT DEPRESSION (HCC): ICD-10-CM

## 2023-08-08 DIAGNOSIS — R42 DIZZINESS AND GIDDINESS: ICD-10-CM

## 2023-08-08 DIAGNOSIS — E03.9 HYPOTHYROIDISM, ADULT: ICD-10-CM

## 2023-08-08 LAB
BILIRUBIN, URINE, POC: NEGATIVE
BLOOD URINE, POC: NEGATIVE
GLUCOSE URINE, POC: NEGATIVE
KETONES, URINE, POC: NEGATIVE
LEUKOCYTE ESTERASE, URINE, POC: NEGATIVE
NITRITE, URINE, POC: NEGATIVE
PH, URINE, POC: 5.5 (ref 4.6–8)
PROTEIN,URINE, POC: NEGATIVE
SPECIFIC GRAVITY, URINE, POC: 1.02 (ref 1–1.03)
URINALYSIS CLARITY, POC: CLEAR
URINALYSIS COLOR, POC: YELLOW
UROBILINOGEN, POC: NORMAL

## 2023-08-08 PROCEDURE — 3078F DIAST BP <80 MM HG: CPT | Performed by: NURSE PRACTITIONER

## 2023-08-08 PROCEDURE — 3074F SYST BP LT 130 MM HG: CPT | Performed by: NURSE PRACTITIONER

## 2023-08-08 PROCEDURE — 93005 ELECTROCARDIOGRAM TRACING: CPT | Performed by: NURSE PRACTITIONER

## 2023-08-08 PROCEDURE — PBSHW AMB POC URINALYSIS DIP STICK MANUAL W/O MICRO: Performed by: NURSE PRACTITIONER

## 2023-08-08 PROCEDURE — 99214 OFFICE O/P EST MOD 30 MIN: CPT | Performed by: NURSE PRACTITIONER

## 2023-08-08 PROCEDURE — 81002 URINALYSIS NONAUTO W/O SCOPE: CPT | Performed by: NURSE PRACTITIONER

## 2023-08-08 PROCEDURE — 93010 ELECTROCARDIOGRAM REPORT: CPT | Performed by: NURSE PRACTITIONER

## 2023-08-08 PROCEDURE — 1123F ACP DISCUSS/DSCN MKR DOCD: CPT | Performed by: NURSE PRACTITIONER

## 2023-08-08 RX ORDER — ESCITALOPRAM OXALATE 10 MG/1
10 TABLET ORAL DAILY
COMMUNITY
Start: 2017-10-31

## 2023-08-08 SDOH — ECONOMIC STABILITY: INCOME INSECURITY: HOW HARD IS IT FOR YOU TO PAY FOR THE VERY BASICS LIKE FOOD, HOUSING, MEDICAL CARE, AND HEATING?: NOT HARD AT ALL

## 2023-08-08 SDOH — ECONOMIC STABILITY: FOOD INSECURITY: WITHIN THE PAST 12 MONTHS, THE FOOD YOU BOUGHT JUST DIDN'T LAST AND YOU DIDN'T HAVE MONEY TO GET MORE.: NEVER TRUE

## 2023-08-08 SDOH — ECONOMIC STABILITY: HOUSING INSECURITY
IN THE LAST 12 MONTHS, WAS THERE A TIME WHEN YOU DID NOT HAVE A STEADY PLACE TO SLEEP OR SLEPT IN A SHELTER (INCLUDING NOW)?: NO

## 2023-08-08 SDOH — ECONOMIC STABILITY: FOOD INSECURITY: WITHIN THE PAST 12 MONTHS, YOU WORRIED THAT YOUR FOOD WOULD RUN OUT BEFORE YOU GOT MONEY TO BUY MORE.: NEVER TRUE

## 2023-08-08 ASSESSMENT — ENCOUNTER SYMPTOMS
CONSTIPATION: 0
EYE REDNESS: 0
ABDOMINAL PAIN: 0
EYE PAIN: 0
GASTROINTESTINAL NEGATIVE: 1
BACK PAIN: 0
RHINORRHEA: 0
DIARRHEA: 0
VOMITING: 0
SHORTNESS OF BREATH: 0
NAUSEA: 0
COUGH: 0
SINUS PAIN: 0
BLOOD IN STOOL: 0
SINUS PRESSURE: 0
RESPIRATORY NEGATIVE: 1
CHEST TIGHTNESS: 0

## 2023-08-08 ASSESSMENT — PATIENT HEALTH QUESTIONNAIRE - PHQ9
1. LITTLE INTEREST OR PLEASURE IN DOING THINGS: 0
SUM OF ALL RESPONSES TO PHQ QUESTIONS 1-9: 0
SUM OF ALL RESPONSES TO PHQ QUESTIONS 1-9: 0
2. FEELING DOWN, DEPRESSED OR HOPELESS: 0
SUM OF ALL RESPONSES TO PHQ9 QUESTIONS 1 & 2: 0
SUM OF ALL RESPONSES TO PHQ QUESTIONS 1-9: 0
SUM OF ALL RESPONSES TO PHQ QUESTIONS 1-9: 0

## 2023-08-08 NOTE — PROGRESS NOTES
Assessment and Plan     1. Dizziness and giddiness: Fully asymptomatic during today's visit. Neuro exam intact. EKG and UA results discussed. Lab work ordered. Hydration encouraged. Referral to cardfologist given. Return and strict ER instructions given. Will reassess in 4 weeks. Pt verbalized understanding.   -     Vascular duplex carotid bilateral; Future  -     AMB POC EKG ROUTINE  -     AMB POC URINALYSIS DIP STICK MANUAL W/O MICRO  -     CBC with Auto Differential; Future  -     Comprehensive Metabolic Panel; Future  -     Hemoglobin A1C; Future  -     TSH; Future  -     T4, Free; Future  -     Iron and TIBC; Future  -     XR CHEST (2 VW); Future  -     [facility] - Teodoro Ceballos MD, Cardiology, [place]  2. Blurry vision: Awaiting for new rx'd glasses, recent eye exam done. Will reassess after new glasses. 3. Fatigue, unspecified type: Will check for possible abnormal thyroid function, anemia, DM. Pt has been referred to cards. -     XR CHEST (2 VW); Future  -     [address] [facility] Teodoro Ceballos MD, Cardiology, [place]  4. First degree AV block: Unable to compare with previous EKG, reports seeing cardiologist before moving to [facility]. 5. Essential hypertension: Blood pressure at goal. Continue taking Losartan. Blood pressure monitor daily recommended. -     Microalbumin / Creatinine Urine Ratio; Future  6. Hypothyroidism, adult: Will check labs, could be causing fatigue, dizziness. 7. Mixed hyperlipidemia: Admits taking Crestor every other day, compliance stress. Will check labs. -     Lipid Panel; Future  8. Recurrent depression ([address]): Continue with Lexapro daily. Denies feeling sad/hopeless. Kathy plan discussed.       Tests Preformed During Visit:    Results for orders placed or performed in visit on 08/08/23   AMB POC URINALYSIS DIP STICK MANUAL W/O MICRO   Result Value Ref Range    Color (UA POC) Yellow     Clarity (UA POC) Clear     Glucose, Urine, POC Negative Negative    Bilirubin, Urine, POC Negative

## 2023-08-09 LAB
ALBUMIN SERPL-MCNC: 3.8 G/DL (ref 3.5–5)
ALBUMIN/GLOB SERPL: 1.3 (ref 1.1–2.2)
ALP SERPL-CCNC: 75 U/L (ref 45–117)
ALT SERPL-CCNC: 34 U/L (ref 12–78)
ANION GAP SERPL CALC-SCNC: 4 MMOL/L (ref 5–15)
AST SERPL-CCNC: 22 U/L (ref 15–37)
BASOPHILS # BLD: 0.1 K/UL (ref 0–0.1)
BASOPHILS NFR BLD: 1 % (ref 0–1)
BILIRUB SERPL-MCNC: 0.5 MG/DL (ref 0.2–1)
BUN SERPL-MCNC: 13 MG/DL (ref 6–20)
BUN/CREAT SERPL: 18 (ref 12–20)
CALCIUM SERPL-MCNC: 8.9 MG/DL (ref 8.5–10.1)
CHLORIDE SERPL-SCNC: 108 MMOL/L (ref 97–108)
CHOLEST SERPL-MCNC: 172 MG/DL
CO2 SERPL-SCNC: 30 MMOL/L (ref 21–32)
CREAT SERPL-MCNC: 0.71 MG/DL (ref 0.55–1.02)
DIFFERENTIAL METHOD BLD: ABNORMAL
EOSINOPHIL # BLD: 0.4 K/UL (ref 0–0.4)
EOSINOPHIL NFR BLD: 5 % (ref 0–7)
ERYTHROCYTE [DISTWIDTH] IN BLOOD BY AUTOMATED COUNT: 14.6 % (ref 11.5–14.5)
EST. AVERAGE GLUCOSE BLD GHB EST-MCNC: 108 MG/DL
GLOBULIN SER CALC-MCNC: 2.9 G/DL (ref 2–4)
GLUCOSE SERPL-MCNC: 84 MG/DL (ref 65–100)
HBA1C MFR BLD: 5.4 % (ref 4–5.6)
HCT VFR BLD AUTO: 42.6 % (ref 35–47)
HDLC SERPL-MCNC: 64 MG/DL
HDLC SERPL: 2.7 (ref 0–5)
HGB BLD-MCNC: 12.8 G/DL (ref 11.5–16)
IMM GRANULOCYTES # BLD AUTO: 0 K/UL (ref 0–0.04)
IMM GRANULOCYTES NFR BLD AUTO: 0 % (ref 0–0.5)
IRON SATN MFR SERPL: 18 % (ref 20–50)
IRON SERPL-MCNC: 72 UG/DL (ref 35–150)
LDLC SERPL CALC-MCNC: 93.4 MG/DL (ref 0–100)
LYMPHOCYTES # BLD: 2.1 K/UL (ref 0.8–3.5)
LYMPHOCYTES NFR BLD: 29 % (ref 12–49)
MCH RBC QN AUTO: 27.5 PG (ref 26–34)
MCHC RBC AUTO-ENTMCNC: 30 G/DL (ref 30–36.5)
MCV RBC AUTO: 91.6 FL (ref 80–99)
MONOCYTES # BLD: 0.6 K/UL (ref 0–1)
MONOCYTES NFR BLD: 9 % (ref 5–13)
NEUTS SEG # BLD: 3.9 K/UL (ref 1.8–8)
NEUTS SEG NFR BLD: 56 % (ref 32–75)
NRBC # BLD: 0 K/UL (ref 0–0.01)
NRBC BLD-RTO: 0 PER 100 WBC
PLATELET # BLD AUTO: 301 K/UL (ref 150–400)
PMV BLD AUTO: 10.5 FL (ref 8.9–12.9)
POTASSIUM SERPL-SCNC: 4.4 MMOL/L (ref 3.5–5.1)
PROT SERPL-MCNC: 6.7 G/DL (ref 6.4–8.2)
RBC # BLD AUTO: 4.65 M/UL (ref 3.8–5.2)
SODIUM SERPL-SCNC: 142 MMOL/L (ref 136–145)
T4 FREE SERPL-MCNC: 1.1 NG/DL (ref 0.8–1.5)
TIBC SERPL-MCNC: 403 UG/DL (ref 250–450)
TRIGL SERPL-MCNC: 73 MG/DL
TSH SERPL DL<=0.05 MIU/L-ACNC: 1.85 UIU/ML (ref 0.36–3.74)
VLDLC SERPL CALC-MCNC: 14.6 MG/DL
WBC # BLD AUTO: 7 K/UL (ref 3.6–11)

## 2023-08-14 DIAGNOSIS — F51.01 PRIMARY INSOMNIA: ICD-10-CM

## 2023-08-14 RX ORDER — ZOLPIDEM TARTRATE 5 MG/1
TABLET ORAL
Qty: 20 TABLET | Refills: 0 | Status: SHIPPED | OUTPATIENT
Start: 2023-08-14 | End: 2023-11-12

## 2023-08-14 RX ORDER — SUMATRIPTAN 100 MG/1
TABLET, FILM COATED ORAL
Qty: 20 TABLET | Refills: 0 | Status: SHIPPED | OUTPATIENT
Start: 2023-08-14

## 2023-08-18 ENCOUNTER — HOSPITAL ENCOUNTER (OUTPATIENT)
Dept: VASCULAR SURGERY | Facility: HOSPITAL | Age: 73
End: 2023-08-18
Payer: MEDICARE

## 2023-08-18 ENCOUNTER — OFFICE VISIT (OUTPATIENT)
Age: 73
End: 2023-08-18

## 2023-08-18 ENCOUNTER — HOSPITAL ENCOUNTER (OUTPATIENT)
Facility: HOSPITAL | Age: 73
End: 2023-08-18
Payer: MEDICARE

## 2023-08-18 VITALS
HEIGHT: 68 IN | WEIGHT: 188.8 LBS | HEART RATE: 99 BPM | BODY MASS INDEX: 28.61 KG/M2 | SYSTOLIC BLOOD PRESSURE: 157 MMHG | DIASTOLIC BLOOD PRESSURE: 85 MMHG

## 2023-08-18 DIAGNOSIS — R42 DIZZINESS AND GIDDINESS: ICD-10-CM

## 2023-08-18 DIAGNOSIS — N90.89 VULVAR IRRITATION: ICD-10-CM

## 2023-08-18 DIAGNOSIS — N89.8 VAGINA ITCHING: Primary | ICD-10-CM

## 2023-08-18 DIAGNOSIS — N76.2 ACUTE VULVITIS: ICD-10-CM

## 2023-08-18 DIAGNOSIS — R53.83 FATIGUE, UNSPECIFIED TYPE: ICD-10-CM

## 2023-08-18 LAB
VAS LEFT CCA DIST EDV: 26.6 CM/S
VAS LEFT CCA DIST PSV: 88.7 CM/S
VAS LEFT CCA PROX EDV: 21.2 CM/S
VAS LEFT CCA PROX PSV: 81.4 CM/S
VAS LEFT ECA EDV: 12.7 CM/S
VAS LEFT ECA PSV: 69.9 CM/S
VAS LEFT ICA DIST EDV: 41.3 CM/S
VAS LEFT ICA DIST PSV: 92.4 CM/S
VAS LEFT ICA MID EDV: 23.7 CM/S
VAS LEFT ICA MID PSV: 54.4 CM/S
VAS LEFT ICA PROX EDV: 18.2 CM/S
VAS LEFT ICA PROX PSV: 55.6 CM/S
VAS LEFT ICA/CCA PSV: 1 NO UNITS
VAS LEFT SUBCLAVIAN PROX EDV: 0 CM/S
VAS LEFT SUBCLAVIAN PROX PSV: 101.6 CM/S
VAS LEFT VERTEBRAL EDV: 28.5 CM/S
VAS LEFT VERTEBRAL PSV: 70.5 CM/S
VAS RIGHT CCA DIST EDV: 21.2 CM/S
VAS RIGHT CCA DIST PSV: 61.3 CM/S
VAS RIGHT CCA PROX EDV: 21.2 CM/S
VAS RIGHT CCA PROX PSV: 79.6 CM/S
VAS RIGHT ECA EDV: 19.3 CM/S
VAS RIGHT ECA PSV: 99.6 CM/S
VAS RIGHT ICA DIST EDV: 32.7 CM/S
VAS RIGHT ICA DIST PSV: 76.9 CM/S
VAS RIGHT ICA MID EDV: 25.9 CM/S
VAS RIGHT ICA MID PSV: 53.4 CM/S
VAS RIGHT ICA PROX EDV: 24.8 CM/S
VAS RIGHT ICA PROX PSV: 59.5 CM/S
VAS RIGHT ICA/CCA PSV: 1.3 NO UNITS
VAS RIGHT SUBCLAVIAN PROX EDV: 0 CM/S
VAS RIGHT SUBCLAVIAN PROX PSV: 110.7 CM/S
VAS RIGHT VERTEBRAL EDV: 13.8 CM/S
VAS RIGHT VERTEBRAL PSV: 39.3 CM/S

## 2023-08-18 PROCEDURE — 93880 EXTRACRANIAL BILAT STUDY: CPT

## 2023-08-18 PROCEDURE — 71046 X-RAY EXAM CHEST 2 VIEWS: CPT

## 2023-08-18 RX ORDER — FLUCONAZOLE 150 MG/1
150 TABLET ORAL ONCE
Qty: 1 TABLET | Refills: 0 | Status: SHIPPED | OUTPATIENT
Start: 2023-08-18 | End: 2023-08-18

## 2023-08-18 NOTE — PROGRESS NOTES
[address] [facility]  [email or web address]  [email or web address]  [phone]    Laura Baxter MD, [address] [facility]      OB/GYN Problem visit    HPI  Sly Abdullahi is a No obstetric history on file. , 74 y.o. female who presents for a problem visit. Chief Complaint   Patient presents with    Vaginal Itching       Ext itching x 2 weeks. Same partner x 1 year. Worse at night. No new water source. No new soaps/detergents. Tried OTC: NI.    PT wants std testing. Per Rooming Note:  Sly Abdullahi is a 74 y.o. female presents for a problem visit. No LMP recorded. Patient is postmenopausal.  Birth Control: post menopausal status. Last Pap: approximate date 12/2022 and was normal  Last or next [address] is: 12/2022 & 12/27/2023 (add mammogram)     Pt has Medicare     The patient is reporting having: vaginal itching on the inside & outside of her vagina for the past 2 weeks. Pt has never had this before. Pt has never had a yeast infection before. Pt has had the same partner for the past year & has not had STD testing since being with him. Pt reports trying anti itch OTC txt with NI. Pt reports there may be a while discharge present. This is a new problem. She has not experienced this problem before. She reports the symptoms are is unchanged. Aggravating factors include none. And alleviating factors include sleeping pills to help her sleep through the discomfort. She does not have other concerns.        Sexual history and Contraception:  Social History     Substance and Sexual Activity   Sexual Activity Yes    Partners: Male       Past Medical History:   Diagnosis Date    Arthritis     Asthma     Back pain     Elevated lipids 09/15/2015    Essential hypertension 09/15/2015    Hypercholesterolemia     Hypothyroidism, adult 09/15/2015    Migraine     allergy mediated in sping and fall    Pap smear for cervical cancer screening 12/01/2022

## 2023-08-18 NOTE — PROGRESS NOTES
Rooming note, gyn problem visit:    Chief Complaint   Patient presents with    Vaginal Itching     Augusto Campbell is a 74 y.o. female presents for a problem visit. No LMP recorded. Patient is postmenopausal.  Birth Control: post menopausal status. Last Pap: approximate date 12/2022 and was normal  Last or next [address] is: 12/2022 & 12/27/2023 (add mammogram)    Pt has Medicare    The patient is reporting having: vaginal itching on the inside & outside of her vagina for the past 2 weeks. Pt has never had this before. Pt has never had a yeast infection before. Pt has had the same partner for the past year & has not had STD testing since being with him. Pt reports trying anti itch OTC txt with NI. Pt reports there may be a while discharge present. This is a new problem. She has not experienced this problem before. She reports the symptoms are is unchanged. Aggravating factors include none. And alleviating factors include sleeping pills to help her sleep through the discomfort. She does not have other concerns.

## 2023-08-22 RX ORDER — ALBUTEROL SULFATE 90 UG/1
AEROSOL, METERED RESPIRATORY (INHALATION)
Qty: 8.5 G | Refills: 0 | Status: SHIPPED | OUTPATIENT
Start: 2023-08-22

## 2023-08-22 RX ORDER — FLUTICASONE PROPIONATE AND SALMETEROL 250; 50 UG/1; UG/1
POWDER RESPIRATORY (INHALATION)
Qty: 1 EACH | Refills: 0 | Status: SHIPPED | OUTPATIENT
Start: 2023-08-22

## 2023-08-22 NOTE — TELEPHONE ENCOUNTER
Msg placed on rx sig to remind pt to call to make an appt to prevent a delay in future refill requests     Pt needs mwv appt

## 2023-08-23 LAB
A VAGINAE DNA VAG QL NAA+PROBE: ABNORMAL SCORE
BVAB2 DNA VAG QL NAA+PROBE: ABNORMAL SCORE
C ALBICANS DNA VAG QL NAA+PROBE: POSITIVE
C GLABRATA DNA VAG QL NAA+PROBE: NEGATIVE
C TRACH DNA VAG QL NAA+PROBE: NEGATIVE
MEGA1 DNA VAG QL NAA+PROBE: ABNORMAL SCORE
N GONORRHOEA DNA VAG QL NAA+PROBE: NEGATIVE
T VAGINALIS DNA VAG QL NAA+PROBE: NEGATIVE

## 2023-09-17 DIAGNOSIS — F51.01 PRIMARY INSOMNIA: ICD-10-CM

## 2023-09-18 RX ORDER — GABAPENTIN 100 MG/1
CAPSULE ORAL
Qty: 90 CAPSULE | Refills: 0 | Status: SHIPPED | OUTPATIENT
Start: 2023-09-18 | End: 2024-01-16

## 2023-09-25 RX ORDER — ROSUVASTATIN CALCIUM 20 MG/1
TABLET, COATED ORAL
Qty: 83 TABLET | Refills: 0 | Status: SHIPPED | OUTPATIENT
Start: 2023-09-25

## 2023-09-25 RX ORDER — LOSARTAN POTASSIUM 50 MG/1
50 TABLET ORAL DAILY
Qty: 90 TABLET | Refills: 0 | Status: SHIPPED | OUTPATIENT
Start: 2023-09-25

## 2023-09-25 RX ORDER — ESCITALOPRAM OXALATE 20 MG/1
20 TABLET ORAL DAILY
Qty: 90 TABLET | Refills: 0 | Status: SHIPPED | OUTPATIENT
Start: 2023-09-25

## 2023-09-25 RX ORDER — FLUTICASONE PROPIONATE AND SALMETEROL 250; 50 UG/1; UG/1
POWDER RESPIRATORY (INHALATION)
Qty: 3 EACH | Refills: 1 | Status: SHIPPED | OUTPATIENT
Start: 2023-09-25

## 2023-10-11 DIAGNOSIS — F51.01 PRIMARY INSOMNIA: ICD-10-CM

## 2023-10-11 RX ORDER — ALBUTEROL SULFATE 90 UG/1
AEROSOL, METERED RESPIRATORY (INHALATION)
Qty: 8.5 G | Refills: 0 | Status: SHIPPED | OUTPATIENT
Start: 2023-10-11

## 2023-10-13 RX ORDER — ZOLPIDEM TARTRATE 5 MG/1
5 TABLET ORAL NIGHTLY PRN
Qty: 20 TABLET | Refills: 0 | Status: SHIPPED | OUTPATIENT
Start: 2023-10-13 | End: 2023-11-12

## 2023-10-19 RX ORDER — SUMATRIPTAN 100 MG/1
TABLET, FILM COATED ORAL
Qty: 20 TABLET | Refills: 0 | Status: SHIPPED | OUTPATIENT
Start: 2023-10-19

## 2023-10-21 DIAGNOSIS — F51.01 PRIMARY INSOMNIA: ICD-10-CM

## 2023-10-24 RX ORDER — FLUTICASONE PROPIONATE AND SALMETEROL 250; 50 UG/1; UG/1
1 POWDER RESPIRATORY (INHALATION) 2 TIMES DAILY
Qty: 3 EACH | Refills: 1 | Status: SHIPPED | OUTPATIENT
Start: 2023-10-24

## 2023-10-24 RX ORDER — SUMATRIPTAN 100 MG/1
100 TABLET, FILM COATED ORAL DAILY
Qty: 20 TABLET | Refills: 0 | Status: SHIPPED | OUTPATIENT
Start: 2023-10-24

## 2023-10-24 RX ORDER — ZOLPIDEM TARTRATE 5 MG/1
5 TABLET ORAL NIGHTLY PRN
Qty: 20 TABLET | Refills: 0 | Status: SHIPPED | OUTPATIENT
Start: 2023-10-24 | End: 2023-11-23

## 2023-10-30 ENCOUNTER — OFFICE VISIT (OUTPATIENT)
Age: 73
End: 2023-10-30
Payer: MEDICARE

## 2023-10-30 ENCOUNTER — HOSPITAL ENCOUNTER (OUTPATIENT)
Facility: HOSPITAL | Age: 73
Discharge: HOME | End: 2023-11-02
Payer: MEDICARE

## 2023-10-30 VITALS
HEIGHT: 68 IN | HEART RATE: 89 BPM | BODY MASS INDEX: 27.74 KG/M2 | DIASTOLIC BLOOD PRESSURE: 82 MMHG | OXYGEN SATURATION: 95 % | SYSTOLIC BLOOD PRESSURE: 132 MMHG | WEIGHT: 183 LBS

## 2023-10-30 DIAGNOSIS — M47.816 SPONDYLOSIS OF LUMBAR SPINE: ICD-10-CM

## 2023-10-30 DIAGNOSIS — E03.9 HYPOTHYROIDISM, ADULT: ICD-10-CM

## 2023-10-30 DIAGNOSIS — R00.2 PALPITATIONS: ICD-10-CM

## 2023-10-30 DIAGNOSIS — I10 ESSENTIAL HYPERTENSION: Primary | ICD-10-CM

## 2023-10-30 PROCEDURE — 99204 OFFICE O/P NEW MOD 45 MIN: CPT | Performed by: INTERNAL MEDICINE

## 2023-10-30 PROCEDURE — 3017F COLORECTAL CA SCREEN DOC REV: CPT | Performed by: INTERNAL MEDICINE

## 2023-10-30 PROCEDURE — 1123F ACP DISCUSS/DSCN MKR DOCD: CPT | Performed by: INTERNAL MEDICINE

## 2023-10-30 PROCEDURE — G8427 DOCREV CUR MEDS BY ELIG CLIN: HCPCS | Performed by: INTERNAL MEDICINE

## 2023-10-30 PROCEDURE — G8419 CALC BMI OUT NRM PARAM NOF/U: HCPCS | Performed by: INTERNAL MEDICINE

## 2023-10-30 PROCEDURE — G8399 PT W/DXA RESULTS DOCUMENT: HCPCS | Performed by: INTERNAL MEDICINE

## 2023-10-30 PROCEDURE — G8484 FLU IMMUNIZE NO ADMIN: HCPCS | Performed by: INTERNAL MEDICINE

## 2023-10-30 PROCEDURE — 3075F SYST BP GE 130 - 139MM HG: CPT | Performed by: INTERNAL MEDICINE

## 2023-10-30 PROCEDURE — 72148 MRI LUMBAR SPINE W/O DYE: CPT

## 2023-10-30 PROCEDURE — 1090F PRES/ABSN URINE INCON ASSESS: CPT | Performed by: INTERNAL MEDICINE

## 2023-10-30 PROCEDURE — 3079F DIAST BP 80-89 MM HG: CPT | Performed by: INTERNAL MEDICINE

## 2023-10-30 PROCEDURE — 1036F TOBACCO NON-USER: CPT | Performed by: INTERNAL MEDICINE

## 2023-10-30 RX ORDER — LOSARTAN POTASSIUM AND HYDROCHLOROTHIAZIDE 12.5; 5 MG/1; MG/1
1 TABLET ORAL DAILY
Qty: 90 TABLET | Refills: 3 | Status: SHIPPED | OUTPATIENT
Start: 2023-10-30

## 2023-10-31 ENCOUNTER — TELEPHONE (OUTPATIENT)
Age: 73
End: 2023-10-31

## 2023-10-31 NOTE — TELEPHONE ENCOUNTER
Enrolled with [employer] - Ordered and being shipped to patient's home address on file. ETA within 7-14 Business Days.      ----- Message from Roxy Zaragoza LPN sent at 67/03/1707  8:31 AM EDT -----  Regarding: holter  7 day holter for palpitations per Dr. Crawley Estimable.

## 2023-11-10 ENCOUNTER — ANCILLARY PROCEDURE (OUTPATIENT)
Age: 73
End: 2023-11-10
Payer: MEDICARE

## 2023-11-10 VITALS
HEIGHT: 68 IN | SYSTOLIC BLOOD PRESSURE: 132 MMHG | WEIGHT: 183 LBS | BODY MASS INDEX: 27.74 KG/M2 | DIASTOLIC BLOOD PRESSURE: 82 MMHG

## 2023-11-10 DIAGNOSIS — R00.2 PALPITATIONS: ICD-10-CM

## 2023-11-10 DIAGNOSIS — E03.9 HYPOTHYROIDISM, ADULT: ICD-10-CM

## 2023-11-10 DIAGNOSIS — I10 ESSENTIAL HYPERTENSION: ICD-10-CM

## 2023-11-10 LAB
ECHO AO ASC DIAM: 3.4 CM
ECHO AO ASCENDING AORTA INDEX: 1.73 CM/M2
ECHO AO ROOT DIAM: 3.4 CM
ECHO AO ROOT INDEX: 1.73 CM/M2
ECHO AR MAX VEL PISA: 3.8 M/S
ECHO AV MEAN GRADIENT: 4 MMHG
ECHO AV MEAN VELOCITY: 1 M/S
ECHO AV PEAK GRADIENT: 7 MMHG
ECHO AV PEAK VELOCITY: 1.4 M/S
ECHO AV REGURGITANT PHT: 637.9 MILLISECOND
ECHO AV VELOCITY RATIO: 0.64
ECHO AV VTI: 26.2 CM
ECHO BSA: 2 M2
ECHO EST RA PRESSURE: 3 MMHG
ECHO LA DIAMETER INDEX: 1.73 CM/M2
ECHO LA DIAMETER: 3.4 CM
ECHO LA TO AORTIC ROOT RATIO: 1
ECHO LA VOL A-L A2C: 45 ML (ref 22–52)
ECHO LA VOL A-L A4C: 45 ML (ref 22–52)
ECHO LA VOL BP: 44 ML (ref 22–52)
ECHO LA VOL MOD A2C: 43 ML (ref 22–52)
ECHO LA VOL MOD A4C: 44 ML (ref 22–52)
ECHO LA VOL/BSA BIPLANE: 22 ML/M2 (ref 16–34)
ECHO LA VOLUME AREA LENGTH: 46 ML
ECHO LA VOLUME INDEX A-L A2C: 23 ML/M2 (ref 16–34)
ECHO LA VOLUME INDEX A-L A4C: 23 ML/M2 (ref 16–34)
ECHO LA VOLUME INDEX AREA LENGTH: 23 ML/M2 (ref 16–34)
ECHO LA VOLUME INDEX MOD A2C: 22 ML/M2 (ref 16–34)
ECHO LA VOLUME INDEX MOD A4C: 22 ML/M2 (ref 16–34)
ECHO LV E' LATERAL VELOCITY: 5 CM/S
ECHO LV E' SEPTAL VELOCITY: 7 CM/S
ECHO LV EDV A2C: 51 ML
ECHO LV EDV A4C: 60 ML
ECHO LV EDV BP: 57 ML (ref 56–104)
ECHO LV EDV INDEX A4C: 30 ML/M2
ECHO LV EDV INDEX BP: 29 ML/M2
ECHO LV EDV NDEX A2C: 26 ML/M2
ECHO LV EJECTION FRACTION A2C: 73 %
ECHO LV EJECTION FRACTION A4C: 48 %
ECHO LV EJECTION FRACTION BIPLANE: 62 % (ref 55–100)
ECHO LV ESV A2C: 13 ML
ECHO LV ESV A4C: 31 ML
ECHO LV ESV BP: 21 ML (ref 19–49)
ECHO LV ESV INDEX A2C: 7 ML/M2
ECHO LV ESV INDEX A4C: 16 ML/M2
ECHO LV ESV INDEX BP: 11 ML/M2
ECHO LV FRACTIONAL SHORTENING: 20 % (ref 28–44)
ECHO LV INTERNAL DIMENSION DIASTOLE INDEX: 2.28 CM/M2
ECHO LV INTERNAL DIMENSION DIASTOLIC: 4.5 CM (ref 3.9–5.3)
ECHO LV INTERNAL DIMENSION SYSTOLIC INDEX: 1.83 CM/M2
ECHO LV INTERNAL DIMENSION SYSTOLIC: 3.6 CM
ECHO LV IVSD: 1 CM (ref 0.6–0.9)
ECHO LV MASS 2D: 142.9 G (ref 67–162)
ECHO LV MASS INDEX 2D: 72.5 G/M2 (ref 43–95)
ECHO LV POSTERIOR WALL DIASTOLIC: 0.9 CM (ref 0.6–0.9)
ECHO LV RELATIVE WALL THICKNESS RATIO: 0.4
ECHO LVOT AV VTI INDEX: 0.6
ECHO LVOT MEAN GRADIENT: 2 MMHG
ECHO LVOT PEAK GRADIENT: 3 MMHG
ECHO LVOT PEAK VELOCITY: 0.9 M/S
ECHO LVOT VTI: 15.7 CM
ECHO MV A VELOCITY: 1.32 M/S
ECHO MV AREA PHT: 3.5 CM2
ECHO MV E DECELERATION TIME (DT): 216.3 MS
ECHO MV E VELOCITY: 0.56 M/S
ECHO MV E/A RATIO: 0.42
ECHO MV E/E' LATERAL: 11.2
ECHO MV PRESSURE HALF TIME (PHT): 62.7 MS
ECHO RA AREA 4C: 11.4 CM2
ECHO RA END SYSTOLIC VOLUME APICAL 4 CHAMBER INDEX BSA: 12 ML/M2
ECHO RA VOLUME AREA LENGTH APICAL 4 CHAMBER: 24 ML
ECHO RA VOLUME: 23 ML
ECHO RIGHT VENTRICULAR SYSTOLIC PRESSURE (RVSP): 22 MMHG
ECHO RV FREE WALL PEAK S': 9 CM/S
ECHO RV INTERNAL DIMENSION: 2.8 CM
ECHO RV TAPSE: 2 CM (ref 1.7–?)
ECHO TV REGURGITANT MAX VELOCITY: 2.19 M/S
ECHO TV REGURGITANT PEAK GRADIENT: 19 MMHG

## 2023-11-10 PROCEDURE — 93306 TTE W/DOPPLER COMPLETE: CPT | Performed by: INTERNAL MEDICINE

## 2023-11-14 NOTE — RESULT ENCOUNTER NOTE
Good news, your echocardiogram showed normal heart function, no structurally abnormalities. Please call the office with further questions.     Dr. Rigoberto Doe

## 2023-11-28 ENCOUNTER — CLINICAL DOCUMENTATION (OUTPATIENT)
Age: 73
End: 2023-11-28

## 2023-11-28 NOTE — PROGRESS NOTES
Left message for patient to call and confirm date and time of appointment scheduled per KT in Jan by staff message

## 2023-12-06 ENCOUNTER — OFFICE VISIT (OUTPATIENT)
Age: 73
End: 2023-12-06
Payer: MEDICARE

## 2023-12-06 VITALS
HEIGHT: 68 IN | BODY MASS INDEX: 27.83 KG/M2 | HEART RATE: 85 BPM | SYSTOLIC BLOOD PRESSURE: 112 MMHG | DIASTOLIC BLOOD PRESSURE: 76 MMHG | OXYGEN SATURATION: 94 % | WEIGHT: 183.6 LBS

## 2023-12-06 DIAGNOSIS — R00.2 PALPITATIONS: ICD-10-CM

## 2023-12-06 DIAGNOSIS — E78.5 ELEVATED LIPIDS: ICD-10-CM

## 2023-12-06 DIAGNOSIS — I10 ESSENTIAL HYPERTENSION: Primary | ICD-10-CM

## 2023-12-06 DIAGNOSIS — E03.9 HYPOTHYROIDISM, ADULT: ICD-10-CM

## 2023-12-06 PROCEDURE — G8399 PT W/DXA RESULTS DOCUMENT: HCPCS | Performed by: INTERNAL MEDICINE

## 2023-12-06 PROCEDURE — 3074F SYST BP LT 130 MM HG: CPT | Performed by: INTERNAL MEDICINE

## 2023-12-06 PROCEDURE — 1123F ACP DISCUSS/DSCN MKR DOCD: CPT | Performed by: INTERNAL MEDICINE

## 2023-12-06 PROCEDURE — 99214 OFFICE O/P EST MOD 30 MIN: CPT | Performed by: INTERNAL MEDICINE

## 2023-12-06 PROCEDURE — 3078F DIAST BP <80 MM HG: CPT | Performed by: INTERNAL MEDICINE

## 2023-12-06 PROCEDURE — 1090F PRES/ABSN URINE INCON ASSESS: CPT | Performed by: INTERNAL MEDICINE

## 2023-12-06 PROCEDURE — 1036F TOBACCO NON-USER: CPT | Performed by: INTERNAL MEDICINE

## 2023-12-06 PROCEDURE — 3017F COLORECTAL CA SCREEN DOC REV: CPT | Performed by: INTERNAL MEDICINE

## 2023-12-06 PROCEDURE — G8427 DOCREV CUR MEDS BY ELIG CLIN: HCPCS | Performed by: INTERNAL MEDICINE

## 2023-12-06 PROCEDURE — G8419 CALC BMI OUT NRM PARAM NOF/U: HCPCS | Performed by: INTERNAL MEDICINE

## 2023-12-06 PROCEDURE — G8484 FLU IMMUNIZE NO ADMIN: HCPCS | Performed by: INTERNAL MEDICINE

## 2023-12-06 ASSESSMENT — PATIENT HEALTH QUESTIONNAIRE - PHQ9
SUM OF ALL RESPONSES TO PHQ9 QUESTIONS 1 & 2: 0
SUM OF ALL RESPONSES TO PHQ QUESTIONS 1-9: 0
2. FEELING DOWN, DEPRESSED OR HOPELESS: 0
SUM OF ALL RESPONSES TO PHQ QUESTIONS 1-9: 0
1. LITTLE INTEREST OR PLEASURE IN DOING THINGS: 0

## 2023-12-23 ENCOUNTER — TELEPHONE (OUTPATIENT)
Age: 73
End: 2023-12-23

## 2023-12-26 RX ORDER — LOSARTAN POTASSIUM 50 MG/1
50 TABLET ORAL DAILY
Qty: 90 TABLET | Refills: 0 | OUTPATIENT
Start: 2023-12-26

## 2023-12-26 RX ORDER — ESCITALOPRAM OXALATE 20 MG/1
20 TABLET ORAL DAILY
Qty: 90 TABLET | Refills: 0 | Status: SHIPPED | OUTPATIENT
Start: 2023-12-26 | End: 2024-01-01 | Stop reason: SDUPTHER

## 2023-12-27 ENCOUNTER — OFFICE VISIT (OUTPATIENT)
Age: 73
End: 2023-12-27
Payer: MEDICARE

## 2023-12-27 VITALS — BODY MASS INDEX: 28.43 KG/M2 | SYSTOLIC BLOOD PRESSURE: 132 MMHG | WEIGHT: 187 LBS | DIASTOLIC BLOOD PRESSURE: 82 MMHG

## 2023-12-27 DIAGNOSIS — Z79.890 HORMONE REPLACEMENT THERAPY: ICD-10-CM

## 2023-12-27 DIAGNOSIS — Z01.419 ENCOUNTER FOR GYNECOLOGICAL EXAMINATION: Primary | ICD-10-CM

## 2023-12-27 DIAGNOSIS — N89.8 VAGINAL DRYNESS: ICD-10-CM

## 2023-12-27 PROCEDURE — G0101 CA SCREEN;PELVIC/BREAST EXAM: HCPCS | Performed by: OBSTETRICS & GYNECOLOGY

## 2023-12-27 PROCEDURE — G8484 FLU IMMUNIZE NO ADMIN: HCPCS | Performed by: OBSTETRICS & GYNECOLOGY

## 2023-12-27 PROCEDURE — 3079F DIAST BP 80-89 MM HG: CPT | Performed by: OBSTETRICS & GYNECOLOGY

## 2023-12-27 PROCEDURE — 3075F SYST BP GE 130 - 139MM HG: CPT | Performed by: OBSTETRICS & GYNECOLOGY

## 2023-12-27 RX ORDER — ESTRADIOL 10 UG/1
10 INSERT VAGINAL
Qty: 24 TABLET | Refills: 3 | Status: SHIPPED | OUTPATIENT
Start: 2023-12-28 | End: 2024-03-27

## 2023-12-27 NOTE — PROGRESS NOTES
[address] [facility]  [email or web address]  [phone]    Alisa Moon MD, [address] [facility]        Annual Gynecologic Exam:  Chief Complaint   Patient presents with    Annual Exam       Fe Biggs is a ,  74 y.o. female   No LMP recorded. Patient is postmenopausal.    She presents for her annual checkup. She is having no problems. Has some discharge/sloughing with luvena sometimes. Had some bleeding with coitus that has resolved: not currently SA. Happy with vaginal estrogen, wants to continue. Per Rooming Note:  No LMP recorded. Patient is postmenopausal.  Her periods are absent  Problems: no problems  Birth Control: post menopausal status. Last Pap: see report obtained 1 year(s) ago. She does not have a history of ELVIN 2, 3 or cervical cancer. Last Mammogram: had her mammogram today in our office. It was see report. Last Bone Density: see report obtained 1 year(s) ago. Last colonoscopy: see report obtained 8 year(s) ago.     Sexual history and Contraception:    Social History     Substance and Sexual Activity   Sexual Activity Yes    Partners: Male      Past Medical History:   Diagnosis Date    Arthritis     Asthma     Back pain     Elevated lipids 09/15/2015    Essential hypertension 09/15/2015    Hypercholesterolemia     Hypothyroidism, adult 09/15/2015    Migraine     allergy mediated in sping and fall    Pap smear for cervical cancer screening 2022    normal    Sun-damaged skin      Current Outpatient Medications   Medication Sig Dispense Refill    [START ON 2023] Estradiol (VAGIFEM) 10 MCG TABS vaginal tablet Place 1 tablet vaginally Twice a Week 24 tablet 3    escitalopram (LEXAPRO) 20 MG tablet Take 1 tablet by mouth daily Appt needed for any refills 90 tablet 0    albuterol sulfate HFA (PROVENTIL;VENTOLIN;PROAIR) 108 (90 Base) MCG/ACT inhaler INHALE 2 PUFFS BY MOUTH EVERY 4 HOURS AS NEEDED FOR WHEEZING OR SHORTNESS OF BREATH **MUST CALL MD FOR APPOINTMENT

## 2023-12-27 NOTE — PROGRESS NOTES
Amando Neal is a 74 y.o. female returns for an annual exam     Chief Complaint   Patient presents with    Annual Exam       No LMP recorded. Patient is postmenopausal.  Her periods are absent  Problems: no problems  Birth Control: post menopausal status. Last Pap: see report obtained 1 year(s) ago. She does not have a history of ELVIN 2, 3 or cervical cancer. Last Mammogram: had her mammogram today in our office. It was see report. Last Bone Density: see report obtained 1 year(s) ago. Last colonoscopy: see report obtained 8 year(s) ago. 1. Have you been to the ER, urgent care clinic, or hospitalized since your last visit? No    2. Have you seen or consulted any other health care providers outside of the [address] since your last visit? No    Examination chaperoned by Augustin West LPN.

## 2024-01-01 RX ORDER — ROSUVASTATIN CALCIUM 20 MG/1
20 TABLET, COATED ORAL DAILY
Qty: 90 TABLET | Refills: 1 | Status: SHIPPED | OUTPATIENT
Start: 2024-01-01

## 2024-01-01 RX ORDER — ESCITALOPRAM OXALATE 20 MG/1
20 TABLET ORAL DAILY
Qty: 90 TABLET | Refills: 0 | Status: SHIPPED | OUTPATIENT
Start: 2024-01-01

## 2024-01-01 RX ORDER — ALBUTEROL SULFATE 90 UG/1
AEROSOL, METERED RESPIRATORY (INHALATION)
Qty: 8.5 G | Refills: 0 | Status: SHIPPED | OUTPATIENT
Start: 2024-01-01

## 2024-01-01 RX ORDER — SUMATRIPTAN 100 MG/1
100 TABLET, FILM COATED ORAL DAILY
Qty: 20 TABLET | Refills: 0 | Status: SHIPPED | OUTPATIENT
Start: 2024-01-01

## 2024-01-05 ENCOUNTER — TELEPHONE (OUTPATIENT)
Age: 74
End: 2024-01-05

## 2024-01-05 ENCOUNTER — OFFICE VISIT (OUTPATIENT)
Age: 74
End: 2024-01-05
Payer: MEDICARE

## 2024-01-05 VITALS
SYSTOLIC BLOOD PRESSURE: 137 MMHG | OXYGEN SATURATION: 99 % | RESPIRATION RATE: 16 BRPM | HEIGHT: 68 IN | DIASTOLIC BLOOD PRESSURE: 85 MMHG | HEART RATE: 92 BPM | TEMPERATURE: 97.4 F | WEIGHT: 185 LBS | BODY MASS INDEX: 28.04 KG/M2

## 2024-01-05 DIAGNOSIS — R79.9 ABNORMAL FINDING OF BLOOD CHEMISTRY, UNSPECIFIED: ICD-10-CM

## 2024-01-05 DIAGNOSIS — R16.0 LIVER MASS: Primary | ICD-10-CM

## 2024-01-05 DIAGNOSIS — R78.89 FINDING OF OTHER SPECIFIED SUBSTANCES, NOT NORMALLY FOUND IN BLOOD: ICD-10-CM

## 2024-01-05 DIAGNOSIS — C78.89 SECONDARY MALIGNANT NEOPLASM OF OTHER DIGESTIVE ORGANS (HCC): ICD-10-CM

## 2024-01-05 PROCEDURE — 3079F DIAST BP 80-89 MM HG: CPT | Performed by: INTERNAL MEDICINE

## 2024-01-05 PROCEDURE — G8399 PT W/DXA RESULTS DOCUMENT: HCPCS | Performed by: INTERNAL MEDICINE

## 2024-01-05 PROCEDURE — G8419 CALC BMI OUT NRM PARAM NOF/U: HCPCS | Performed by: INTERNAL MEDICINE

## 2024-01-05 PROCEDURE — 99203 OFFICE O/P NEW LOW 30 MIN: CPT | Performed by: INTERNAL MEDICINE

## 2024-01-05 PROCEDURE — 3075F SYST BP GE 130 - 139MM HG: CPT | Performed by: INTERNAL MEDICINE

## 2024-01-05 PROCEDURE — G8484 FLU IMMUNIZE NO ADMIN: HCPCS | Performed by: INTERNAL MEDICINE

## 2024-01-05 PROCEDURE — 3017F COLORECTAL CA SCREEN DOC REV: CPT | Performed by: INTERNAL MEDICINE

## 2024-01-05 PROCEDURE — 1090F PRES/ABSN URINE INCON ASSESS: CPT | Performed by: INTERNAL MEDICINE

## 2024-01-05 PROCEDURE — 1036F TOBACCO NON-USER: CPT | Performed by: INTERNAL MEDICINE

## 2024-01-05 PROCEDURE — G8427 DOCREV CUR MEDS BY ELIG CLIN: HCPCS | Performed by: INTERNAL MEDICINE

## 2024-01-05 PROCEDURE — 1123F ACP DISCUSS/DSCN MKR DOCD: CPT | Performed by: INTERNAL MEDICINE

## 2024-01-05 RX ORDER — DICLOFENAC SODIUM 75 MG/1
75 TABLET, DELAYED RELEASE ORAL AS NEEDED
COMMUNITY
Start: 2023-12-10

## 2024-01-05 ASSESSMENT — PATIENT HEALTH QUESTIONNAIRE - PHQ9
SUM OF ALL RESPONSES TO PHQ QUESTIONS 1-9: 0
1. LITTLE INTEREST OR PLEASURE IN DOING THINGS: 0
2. FEELING DOWN, DEPRESSED OR HOPELESS: 0
SUM OF ALL RESPONSES TO PHQ QUESTIONS 1-9: 0
SUM OF ALL RESPONSES TO PHQ QUESTIONS 1-9: 0
SUM OF ALL RESPONSES TO PHQ9 QUESTIONS 1 & 2: 0
SUM OF ALL RESPONSES TO PHQ QUESTIONS 1-9: 0

## 2024-01-05 NOTE — TELEPHONE ENCOUNTER
Called patient and left detailed VM - orders were sent to [facility] lab to schedule mobile phlebotomy to draw Oncoguard testing. Schedule MRI of the Abd at [facility] on 1/17/24 At 9:00am to arrive at 8:30am to the outpatient registration from the main entrance of the hospital - instructions given to not wear metal and bring insurance card and ID - no other prep for this test. Provided my direct phone number for any questions or concerns.

## 2024-01-05 NOTE — PROGRESS NOTES
Identified pt with two pt identifiers(name and ). Reviewed record in preparation for visit and have obtained necessary documentation.  Vitals:    24 0900   BP: 137/85   Site: Right Upper Arm   Position: Sitting   Cuff Size: Medium Adult   Pulse: 92   Resp: 16   Temp: 97.4 °F (36.3 °C)   TempSrc: Temporal   SpO2: 99%   Weight: 83.9 kg (185 lb)   Height: 1.727 m (5' 8\")        Health Maintenance Review: Patient reminded of \"due or due soon\" health maintenance. I have asked the patient to contact his/her primary care provider (PCP) for follow-up on his/her health maintenance.    Coordination of Care Questionnaire:  :   1) Have you been to an emergency room, urgent care, or hospitalized since your last visit?  If yes, where when, and reason for visit? no       2. Have seen or consulted any other health care provider since your last visit?   If yes, where when, and reason for visit?  No      Patient is accompanied by self I have received verbal consent from Coty Jackson to discuss any/all medical information while they are present in the room.    
(ADVAIR) 250-50 MCG/ACT AEPB diskus inhaler 1 puff, Inhalation, 2 TIMES DAILY    gabapentin (NEURONTIN) 100 MG capsule TAKE ONE CAPSULE BY MOUTH ONCE NIGHTLY **MAX DAILY AMOUNT 100MG    levothyroxine (SYNTHROID) 75 mcg, Oral, DAILY BEFORE BREAKFAST    losartan-hydroCHLOROthiazide (HYZAAR) 50-12.5 MG per tablet 1 tablet, Oral, DAILY    Lysine 1,000 mg, Oral, DAILY    montelukast (SINGULAIR) 10 mg, Oral, DAILY    Multiple Vitamins-Minerals (MULTIVITAMIN ADULTS PO) 1 TAB ONCE A DAY    rosuvastatin (CRESTOR) 20 mg, Oral, DAILY    SUMAtriptan (IMITREX) 100 mg, Oral, DAILY    vitamin E 100 units capsule 1 cap(s) orally once a day         SYSTEM REVIEW NOT RELATED TO LIVER DISEASE OR REVIEWED ABOVE:  Constitution systems: Negative for fever, chills, weight gain, weight loss.   Eyes: Negative for visual changes.  ENT: Negative for sore throat, painful swallowing.   Respiratory: Negative for cough, hemoptysis, SOB.   Cardiology: Negative for chest pain, palpitations.  GI:  Negative for constipation or diarrhea.  : Negative for urinary frequency, dysuria, hematuria, nocturia.   Skin: Negative for rash.  Hematology: Negative for easy bruising, blood clots.    Musculo-skelatal: Negative for back pain, muscle pain, weakness.  Neurologic: Negative for headaches, dizziness, vertigo, memory problems not related to HE.  Psychology: Negative for anxiety, depression.       FAMILY HISTORY:  The father  of colon cancer.    The mother  of uterine cancer, MS.    There is no family history of liver disease.      SOCIAL HISTORY:  The patient is .    The patient has 1 child  The patient has never used tobacco products.    The patient has 6-7 alcohol drinks per per week.    The patient currently works part time as .        PHYSICAL EXAMINATION:  /85 (Site: Right Upper Arm, Position: Sitting, Cuff Size: Medium Adult)   Pulse 92   Temp 97.4 °F (36.3 °C) (Temporal)   Resp 16   Ht 1.727 m (5' 8\")   Wt 83.9 kg

## 2024-01-08 RX ORDER — ROSUVASTATIN CALCIUM 20 MG/1
TABLET, COATED ORAL
Qty: 83 TABLET | OUTPATIENT
Start: 2024-01-08

## 2024-01-08 NOTE — TELEPHONE ENCOUNTER
Duplicate. Rx sent on 01/01/24 for 90 day supply with 1 refill sent to [facility] pharmacy on [address].

## 2024-01-09 LAB
A1AT SERPL-MCNC: 131 MG/DL (ref 101–187)
AFP L3 MFR SERPL: NORMAL % (ref 0–9.9)
AFP SERPL-MCNC: 2.6 NG/ML (ref 0–9.2)
ALBUMIN SERPL-MCNC: 4.5 G/DL (ref 3.8–4.8)
ALP SERPL-CCNC: 74 IU/L (ref 44–121)
ALT SERPL-CCNC: 40 IU/L (ref 0–32)
AST SERPL-CCNC: 35 IU/L (ref 0–40)
BASOPHILS # BLD AUTO: 0.1 X10E3/UL (ref 0–0.2)
BASOPHILS NFR BLD AUTO: 1 %
BILIRUB DIRECT SERPL-MCNC: 0.11 MG/DL (ref 0–0.4)
BILIRUB SERPL-MCNC: 0.4 MG/DL (ref 0–1.2)
BUN SERPL-MCNC: 12 MG/DL (ref 8–27)
BUN/CREAT SERPL: 16 (ref 12–28)
CALCIUM SERPL-MCNC: 10 MG/DL (ref 8.7–10.3)
CANCER AG19-9 SERPL-ACNC: 23 U/ML (ref 0–35)
CEA SERPL-MCNC: 2 NG/ML (ref 0–4.7)
CHLORIDE SERPL-SCNC: 102 MMOL/L (ref 96–106)
CO2 SERPL-SCNC: 26 MMOL/L (ref 20–29)
CREAT SERPL-MCNC: 0.76 MG/DL (ref 0.57–1)
EGFRCR SERPLBLD CKD-EPI 2021: 82 ML/MIN/1.73
EOSINOPHIL # BLD AUTO: 0.2 X10E3/UL (ref 0–0.4)
EOSINOPHIL NFR BLD AUTO: 2 %
ERYTHROCYTE [DISTWIDTH] IN BLOOD BY AUTOMATED COUNT: 13.2 % (ref 11.7–15.4)
FERRITIN SERPL-MCNC: 80 NG/ML (ref 15–150)
GLUCOSE SERPL-MCNC: 102 MG/DL (ref 70–99)
HAV AB SER QL IA: NEGATIVE
HCT VFR BLD AUTO: 42.9 % (ref 34–46.6)
HGB BLD-MCNC: 14 G/DL (ref 11.1–15.9)
IMM GRANULOCYTES # BLD AUTO: 0 X10E3/UL (ref 0–0.1)
IMM GRANULOCYTES NFR BLD AUTO: 0 %
INR PPP: 0.9 (ref 0.9–1.2)
IRON SATN MFR SERPL: 21 % (ref 15–55)
IRON SERPL-MCNC: 84 UG/DL (ref 27–139)
LYMPHOCYTES # BLD AUTO: 1.7 X10E3/UL (ref 0.7–3.1)
LYMPHOCYTES NFR BLD AUTO: 20 %
MCH RBC QN AUTO: 28.1 PG (ref 26.6–33)
MCHC RBC AUTO-ENTMCNC: 32.6 G/DL (ref 31.5–35.7)
MCV RBC AUTO: 86 FL (ref 79–97)
MONOCYTES # BLD AUTO: 0.6 X10E3/UL (ref 0.1–0.9)
MONOCYTES NFR BLD AUTO: 7 %
NEUTROPHILS # BLD AUTO: 5.9 X10E3/UL (ref 1.4–7)
NEUTROPHILS NFR BLD AUTO: 70 %
PLATELET # BLD AUTO: 332 X10E3/UL (ref 150–450)
POTASSIUM SERPL-SCNC: 4.2 MMOL/L (ref 3.5–5.2)
PROT SERPL-MCNC: 6.9 G/DL (ref 6–8.5)
PROTHROMBIN TIME: 10.1 SEC (ref 9.1–12)
RBC # BLD AUTO: 4.98 X10E6/UL (ref 3.77–5.28)
SODIUM SERPL-SCNC: 144 MMOL/L (ref 134–144)
TIBC SERPL-MCNC: 405 UG/DL (ref 250–450)
UIBC SERPL-MCNC: 321 UG/DL (ref 118–369)
WBC # BLD AUTO: 8.5 X10E3/UL (ref 3.4–10.8)

## 2024-01-17 ENCOUNTER — HOSPITAL ENCOUNTER (OUTPATIENT)
Facility: HOSPITAL | Age: 74
Discharge: HOME | End: 2024-01-20
Attending: INTERNAL MEDICINE
Payer: MEDICARE

## 2024-01-17 VITALS — BODY MASS INDEX: 28.13 KG/M2 | WEIGHT: 185 LBS

## 2024-01-17 DIAGNOSIS — R16.0 LIVER MASS: ICD-10-CM

## 2024-01-17 PROCEDURE — 6360000004 HC RX CONTRAST MEDICATION: Performed by: RADIOLOGY

## 2024-01-17 PROCEDURE — A9579 GAD-BASE MR CONTRAST NOS,1ML: HCPCS | Performed by: RADIOLOGY

## 2024-01-17 PROCEDURE — 74183 MRI ABD W/O CNTR FLWD CNTR: CPT

## 2024-01-17 RX ADMIN — GADOTERIDOL 16 ML: 279.3 INJECTION, SOLUTION INTRAVENOUS at 09:14

## 2024-02-01 ENCOUNTER — PATIENT MESSAGE (OUTPATIENT)
Age: 74
End: 2024-02-01

## 2024-02-02 ENCOUNTER — OFFICE VISIT (OUTPATIENT)
Age: 74
End: 2024-02-02
Payer: MEDICARE

## 2024-02-02 VITALS
HEIGHT: 68 IN | DIASTOLIC BLOOD PRESSURE: 73 MMHG | RESPIRATION RATE: 16 BRPM | OXYGEN SATURATION: 97 % | HEART RATE: 86 BPM | SYSTOLIC BLOOD PRESSURE: 112 MMHG | TEMPERATURE: 97.9 F | WEIGHT: 179 LBS | BODY MASS INDEX: 27.13 KG/M2

## 2024-02-02 DIAGNOSIS — K76.89 LIVER CYST: Primary | ICD-10-CM

## 2024-02-02 PROCEDURE — G8427 DOCREV CUR MEDS BY ELIG CLIN: HCPCS | Performed by: INTERNAL MEDICINE

## 2024-02-02 PROCEDURE — 3078F DIAST BP <80 MM HG: CPT | Performed by: INTERNAL MEDICINE

## 2024-02-02 PROCEDURE — 1090F PRES/ABSN URINE INCON ASSESS: CPT | Performed by: INTERNAL MEDICINE

## 2024-02-02 PROCEDURE — 99213 OFFICE O/P EST LOW 20 MIN: CPT | Performed by: INTERNAL MEDICINE

## 2024-02-02 PROCEDURE — G8399 PT W/DXA RESULTS DOCUMENT: HCPCS | Performed by: INTERNAL MEDICINE

## 2024-02-02 PROCEDURE — G8484 FLU IMMUNIZE NO ADMIN: HCPCS | Performed by: INTERNAL MEDICINE

## 2024-02-02 PROCEDURE — G8419 CALC BMI OUT NRM PARAM NOF/U: HCPCS | Performed by: INTERNAL MEDICINE

## 2024-02-02 PROCEDURE — 3017F COLORECTAL CA SCREEN DOC REV: CPT | Performed by: INTERNAL MEDICINE

## 2024-02-02 PROCEDURE — 1036F TOBACCO NON-USER: CPT | Performed by: INTERNAL MEDICINE

## 2024-02-02 PROCEDURE — 1123F ACP DISCUSS/DSCN MKR DOCD: CPT | Performed by: INTERNAL MEDICINE

## 2024-02-02 PROCEDURE — 3074F SYST BP LT 130 MM HG: CPT | Performed by: INTERNAL MEDICINE

## 2024-02-02 RX ORDER — ZOLPIDEM TARTRATE 5 MG/1
TABLET ORAL
COMMUNITY
Start: 2024-01-10

## 2024-02-02 RX ORDER — OMEGA-3/DHA/EPA/FISH OIL 300-1000MG
2 CAPSULE ORAL DAILY
COMMUNITY

## 2024-02-02 ASSESSMENT — PATIENT HEALTH QUESTIONNAIRE - PHQ9
SUM OF ALL RESPONSES TO PHQ QUESTIONS 1-9: 0
1. LITTLE INTEREST OR PLEASURE IN DOING THINGS: 0
SUM OF ALL RESPONSES TO PHQ9 QUESTIONS 1 & 2: 0
SUM OF ALL RESPONSES TO PHQ QUESTIONS 1-9: 0
SUM OF ALL RESPONSES TO PHQ QUESTIONS 1-9: 0
2. FEELING DOWN, DEPRESSED OR HOPELESS: 0
SUM OF ALL RESPONSES TO PHQ QUESTIONS 1-9: 0

## 2024-02-02 NOTE — PROGRESS NOTES
[facility]      Joshua Boyd MD, FACP, FACG, FAASLD      Alejandra Silva, PA-OPAL Mathew, [facility]   Lore Briggs, [facility]   Catrachita Bairesosta, [facility]-  Satish Ross, [facility]   Annalise Molina, [facility]   Ca Lay, [facility]   [address], [address]   [place]  [address] [phone]   FAX: [phone]  [facility]   [address], [address]   [place]  [address] [phone]   FAX: [phone]       Patient Care Team:  Henna Mederos MD as PCP - General  Henna Mederos MD as PCP - Empaneled Provider  Kevin Horvath MD as Physician  Joshua Boyd MD as Consulting Physician (Hepatology)      Patient Active Problem List   Diagnosis    Skin lesions, generalized    Essential hypertension    Asthma    Elevated lipids    Recurrent depression (HCC)    Hypothyroidism, adult    Palpitations       Coty Jackson is being seen at [facility] for management of a single Liver cyst    The active problem list, all pertinent past medical history, medications,   radiologic findings and laboratory findings related to the liver disorder were reviewed and discussed with the patient.      The patient is a 73 y.o. female without any history of previous liver disease.      The patient underwent a MRI for evaluation of back pain in 10/2023.      This demonstrated a single mass measuring 3.0 cm in the right lobe of the liver.      A dynamic MRI of the liver performed in 1/2024.  Results suggest the liver appears normal with a simple liver cyst in the right lobe    In the office today the patient has the following symptoms:  fatigue,     The patient is not experiencing the following symptoms which are commonly seen in this liver disorder:   pain in the

## 2024-02-02 NOTE — PROGRESS NOTES
Identified pt with two pt identifiers(name and ). Reviewed record in preparation for visit and have obtained necessary documentation.  Vitals:    24 1218   BP: 112/73   Site: Right Upper Arm   Position: Sitting   Cuff Size: Large Adult   Pulse: 86   Resp: 16   Temp: 97.9 °F (36.6 °C)   TempSrc: Temporal   SpO2: 97%   Weight: 81.2 kg (179 lb)   Height: 1.727 m (5' 8\")        Health Maintenance Review: Patient reminded of \"due or due soon\" health maintenance. I have asked the patient to contact his/her primary care provider (PCP) for follow-up on his/her health maintenance.    Coordination of Care Questionnaire:  :   1) Have you been to an emergency room, urgent care, or hospitalized since your last visit?  If yes, where when, and reason for visit? no       2. Have seen or consulted any other health care provider since your last visit?   If yes, where when, and reason for visit?  No, f/u with PCP next week      Patient is accompanied by self I have received verbal consent from Coty Jackson to discuss any/all medical information while they are present in the room.

## 2024-02-07 ENCOUNTER — OFFICE VISIT (OUTPATIENT)
Age: 74
End: 2024-02-07
Payer: MEDICARE

## 2024-02-07 VITALS
HEART RATE: 80 BPM | SYSTOLIC BLOOD PRESSURE: 118 MMHG | HEIGHT: 68 IN | RESPIRATION RATE: 16 BRPM | WEIGHT: 178.8 LBS | BODY MASS INDEX: 27.1 KG/M2 | TEMPERATURE: 98.1 F | OXYGEN SATURATION: 98 % | DIASTOLIC BLOOD PRESSURE: 74 MMHG

## 2024-02-07 DIAGNOSIS — F33.9 RECURRENT DEPRESSION (HCC): ICD-10-CM

## 2024-02-07 DIAGNOSIS — E53.8 LOW SERUM VITAMIN B12: ICD-10-CM

## 2024-02-07 DIAGNOSIS — I10 ESSENTIAL HYPERTENSION: Primary | ICD-10-CM

## 2024-02-07 DIAGNOSIS — E03.9 ACQUIRED HYPOTHYROIDISM: ICD-10-CM

## 2024-02-07 LAB
COMMENT:: NORMAL
FOLATE SERPL-MCNC: 31.4 NG/ML (ref 5–21)
SPECIMEN HOLD: NORMAL
T4 FREE SERPL-MCNC: 1 NG/DL (ref 0.8–1.5)
TSH SERPL DL<=0.05 MIU/L-ACNC: 2.14 UIU/ML (ref 0.36–3.74)
VIT B12 SERPL-MCNC: 861 PG/ML (ref 193–986)

## 2024-02-07 PROCEDURE — 3078F DIAST BP <80 MM HG: CPT | Performed by: INTERNAL MEDICINE

## 2024-02-07 PROCEDURE — 1090F PRES/ABSN URINE INCON ASSESS: CPT | Performed by: INTERNAL MEDICINE

## 2024-02-07 PROCEDURE — G8419 CALC BMI OUT NRM PARAM NOF/U: HCPCS | Performed by: INTERNAL MEDICINE

## 2024-02-07 PROCEDURE — 3017F COLORECTAL CA SCREEN DOC REV: CPT | Performed by: INTERNAL MEDICINE

## 2024-02-07 PROCEDURE — 1036F TOBACCO NON-USER: CPT | Performed by: INTERNAL MEDICINE

## 2024-02-07 PROCEDURE — G8399 PT W/DXA RESULTS DOCUMENT: HCPCS | Performed by: INTERNAL MEDICINE

## 2024-02-07 PROCEDURE — G8484 FLU IMMUNIZE NO ADMIN: HCPCS | Performed by: INTERNAL MEDICINE

## 2024-02-07 PROCEDURE — 99214 OFFICE O/P EST MOD 30 MIN: CPT | Performed by: INTERNAL MEDICINE

## 2024-02-07 PROCEDURE — G8427 DOCREV CUR MEDS BY ELIG CLIN: HCPCS | Performed by: INTERNAL MEDICINE

## 2024-02-07 PROCEDURE — 1123F ACP DISCUSS/DSCN MKR DOCD: CPT | Performed by: INTERNAL MEDICINE

## 2024-02-07 PROCEDURE — 3074F SYST BP LT 130 MM HG: CPT | Performed by: INTERNAL MEDICINE

## 2024-02-07 NOTE — PROGRESS NOTES
ASSESSMENT & PLAN:  1. Essential hypertension  At goal  Labs recently checked by hepatology  Continue losartan hydrochlorothiazide 50/12.5  I think her abstinence from alcohol is helping her blood pressure    2. Acquired hypothyroidism  Having some apathy, weight loss, decreased appetite  Will check thyroid and adjust as needed-     TSH; Future  -     T4, Free; Future  3. Low serum vitamin B12  Does not eat very much red meat-     Vitamin B12 & Folate; Future  4. Recurrent depression (HCC)  Discussed depression symptoms with apathy  We will check labs  She used to have a garden but now has an apartment garden--not the same member of [employer]    Requested patient to send me a MyChart in 3 months to let me know how she is doing  Can see her in 3 months or earlier if needed          Chief Complaint   Patient presents with    Medication Check     Patient is currently fasting      Since last visit patient and has had follow-up with hepatology.  She had a full workup and found to have a benign liver cyst.  She does have fatty liver.  She was also seen by Dr. Fernandez and workup negative for cause for her dizziness or lightheadedness  Notes that she has not had alcohol since Thanksgiving.  She has not had very much alcohol for the past 3 months.    Still having some fatigue and apathy.  She is interested in losing weight and goal weight around 150        SUBJECTIVE: Coty Jackson is a 73 y.o. female here for follow up of hypothyroidism.    Lab Results   Component Value Date/Time    TSH 2.55 02/27/2023 01:19 PM     Thyroid ROS: fatigue, weight gain, and apathy type symptoms.       Depression  Shares that she was put on Lexapro initially for some anxiety and depression.  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  Appetite not strong.  She has lost weight but not from exercise but rather decreased intake-no desire to eat certain foods or no

## 2024-02-26 RX ORDER — LEVOTHYROXINE SODIUM 0.07 MG/1
TABLET ORAL
Qty: 90 TABLET | Refills: 0 | Status: SHIPPED | OUTPATIENT
Start: 2024-02-26

## 2024-02-26 RX ORDER — MONTELUKAST SODIUM 10 MG/1
10 TABLET ORAL DAILY
Qty: 90 TABLET | Refills: 1 | Status: SHIPPED | OUTPATIENT
Start: 2024-02-26

## 2024-03-06 RX ORDER — SUMATRIPTAN 100 MG/1
100 TABLET, FILM COATED ORAL DAILY
Qty: 20 TABLET | Refills: 0 | Status: SHIPPED | OUTPATIENT
Start: 2024-03-06

## 2024-03-06 RX ORDER — ESTRADIOL 10 UG/1
10 INSERT VAGINAL
Qty: 24 TABLET | Refills: 3 | OUTPATIENT
Start: 2024-03-07 | End: 2024-06-05

## 2024-03-06 RX ORDER — ALBUTEROL SULFATE 90 UG/1
AEROSOL, METERED RESPIRATORY (INHALATION)
Qty: 8.5 G | Refills: 0 | Status: SHIPPED | OUTPATIENT
Start: 2024-03-06

## 2024-04-24 DIAGNOSIS — F51.01 PRIMARY INSOMNIA: ICD-10-CM

## 2024-04-24 RX ORDER — SUMATRIPTAN 100 MG/1
100 TABLET, FILM COATED ORAL DAILY
Qty: 20 TABLET | Refills: 0 | Status: SHIPPED | OUTPATIENT
Start: 2024-04-24

## 2024-04-25 RX ORDER — GABAPENTIN 100 MG/1
CAPSULE ORAL
Qty: 90 CAPSULE | Refills: 1 | Status: SHIPPED | OUTPATIENT
Start: 2024-04-25 | End: 2025-03-06

## 2024-05-02 ENCOUNTER — OFFICE VISIT (OUTPATIENT)
Age: 74
End: 2024-05-02
Payer: MEDICARE

## 2024-05-02 VITALS
SYSTOLIC BLOOD PRESSURE: 124 MMHG | RESPIRATION RATE: 16 BRPM | HEIGHT: 68 IN | DIASTOLIC BLOOD PRESSURE: 74 MMHG | HEART RATE: 82 BPM | OXYGEN SATURATION: 94 % | BODY MASS INDEX: 27.4 KG/M2 | WEIGHT: 180.8 LBS | TEMPERATURE: 98.1 F

## 2024-05-02 DIAGNOSIS — F51.01 PRIMARY INSOMNIA: ICD-10-CM

## 2024-05-02 DIAGNOSIS — I10 ESSENTIAL HYPERTENSION: ICD-10-CM

## 2024-05-02 DIAGNOSIS — Z01.818 PREOP EXAMINATION: Primary | ICD-10-CM

## 2024-05-02 PROCEDURE — 3074F SYST BP LT 130 MM HG: CPT | Performed by: NURSE PRACTITIONER

## 2024-05-02 PROCEDURE — 3078F DIAST BP <80 MM HG: CPT | Performed by: NURSE PRACTITIONER

## 2024-05-02 PROCEDURE — 99214 OFFICE O/P EST MOD 30 MIN: CPT | Performed by: NURSE PRACTITIONER

## 2024-05-02 PROCEDURE — 1123F ACP DISCUSS/DSCN MKR DOCD: CPT | Performed by: NURSE PRACTITIONER

## 2024-05-02 PROCEDURE — 1090F PRES/ABSN URINE INCON ASSESS: CPT | Performed by: NURSE PRACTITIONER

## 2024-05-02 PROCEDURE — G8399 PT W/DXA RESULTS DOCUMENT: HCPCS | Performed by: NURSE PRACTITIONER

## 2024-05-02 PROCEDURE — G8419 CALC BMI OUT NRM PARAM NOF/U: HCPCS | Performed by: NURSE PRACTITIONER

## 2024-05-02 PROCEDURE — G8427 DOCREV CUR MEDS BY ELIG CLIN: HCPCS | Performed by: NURSE PRACTITIONER

## 2024-05-02 PROCEDURE — 1036F TOBACCO NON-USER: CPT | Performed by: NURSE PRACTITIONER

## 2024-05-02 RX ORDER — ZOLPIDEM TARTRATE 5 MG/1
TABLET ORAL
Qty: 30 TABLET | Refills: 0 | Status: SHIPPED | OUTPATIENT
Start: 2024-05-02 | End: 2024-07-02

## 2024-05-02 NOTE — PROGRESS NOTES
Coty Jackson is a 74 y.o. female patient who presents for a Pre-Op Examination.    Agree with Nurse history.    HISTORY OF PRESENT ILLNESS    She has been scheduled for bilateral cataract surgery surgery on L eye on 05/06/2024  and R eye on 05/20/2024  by Dr. Hoa Platt at [facility].       Hypertension ROS: taking medications as instructed, no medication side effects noted, home BP monitoring in range of 120-130s systolic over 70's diastolic, no TIA's, no chest pain on exertion, no dyspnea on       ROS     ROS is negative except as mentioned in the HPI.    Previous intolerance to Anesthesia? No    Latex Allergies?  No    ALLERGIES:    Allergies   Allergen Reactions    Sulfa Antibiotics Anaphylaxis       CURRENT MEDICATIONS:    Current Outpatient Medications:     gabapentin (NEURONTIN) 100 MG capsule, TAKE ONE CAPSULE BY MOUTH ONCE NIGHTLY **MAX DAILY AMOUNT 100MG, Disp: 90 capsule, Rfl: 1    SUMAtriptan (IMITREX) 100 MG tablet, Take 1 tablet by mouth daily, Disp: 20 tablet, Rfl: 0    albuterol sulfate HFA (PROVENTIL;VENTOLIN;PROAIR) 108 (90 Base) MCG/ACT inhaler, INHALE 2 PUFFS BY MOUTH EVERY 4 HOURS AS NEEDED FOR WHEEZING OR SHORTNESS OF BREATH **MUST CALL MD FOR APPOINTMENT, Disp: 8.5 g, Rfl: 0    montelukast (SINGULAIR) 10 MG tablet, TAKE ONE TABLET BY MOUTH DAILY, Disp: 90 tablet, Rfl: 1    levothyroxine (SYNTHROID) 75 MCG tablet, TAKE ONE TABLET BY MOUTH DAILY BEFORE BREAKFAST, Disp: 90 tablet, Rfl: 0    zolpidem (AMBIEN) 5 MG tablet, , Disp: , Rfl:     diclofenac (VOLTAREN) 75 MG EC tablet, Take 1 tablet by mouth as needed, Disp: , Rfl:     Multiple Vitamins-Minerals (MULTIVITAMIN ADULTS PO), 1 TAB ONCE A DAY, Disp: , Rfl:     Lysine 1000 MG TABS, Take 1,000 mg by mouth daily, Disp: , Rfl:     rosuvastatin (CRESTOR) 20 MG tablet, Take 1 tablet by mouth daily, Disp: 90 tablet, Rfl: 1    escitalopram (LEXAPRO) 20 MG tablet, Take 1 tablet by mouth daily Appt needed for any refills, Disp: 90

## 2024-05-22 RX ORDER — LEVOTHYROXINE SODIUM 0.07 MG/1
75 TABLET ORAL
Qty: 90 TABLET | Refills: 0 | Status: SHIPPED | OUTPATIENT
Start: 2024-05-22

## 2024-05-30 PROBLEM — J45.41 MODERATE PERSISTENT ASTHMA WITH (ACUTE) EXACERBATION: Status: ACTIVE | Noted: 2022-04-30

## 2024-06-18 RX ORDER — SUMATRIPTAN 100 MG/1
100 TABLET, FILM COATED ORAL DAILY
Qty: 20 TABLET | Refills: 0 | Status: SHIPPED | OUTPATIENT
Start: 2024-06-18

## 2024-06-21 ENCOUNTER — OFFICE VISIT (OUTPATIENT)
Age: 74
End: 2024-06-21
Payer: MEDICARE

## 2024-06-21 VITALS
SYSTOLIC BLOOD PRESSURE: 122 MMHG | BODY MASS INDEX: 27.04 KG/M2 | RESPIRATION RATE: 16 BRPM | TEMPERATURE: 98.4 F | HEIGHT: 68 IN | HEART RATE: 93 BPM | OXYGEN SATURATION: 95 % | WEIGHT: 178.4 LBS | DIASTOLIC BLOOD PRESSURE: 58 MMHG
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
## 2024-06-21 DIAGNOSIS — R50.9 FEVER, UNSPECIFIED FEVER CAUSE: Primary | ICD-10-CM

## 2024-06-21 DIAGNOSIS — R11.11 VOMITING WITHOUT NAUSEA, UNSPECIFIED VOMITING TYPE: ICD-10-CM

## 2024-06-21 DIAGNOSIS — R19.7 DIARRHEA, UNSPECIFIED TYPE: ICD-10-CM

## 2024-06-21 DIAGNOSIS — R50.9 FEVER, UNSPECIFIED FEVER CAUSE: ICD-10-CM

## 2024-06-21 DIAGNOSIS — R51.9 ACUTE NONINTRACTABLE HEADACHE, UNSPECIFIED HEADACHE TYPE: ICD-10-CM

## 2024-06-21 LAB
C COLI+JEJUNI TUF STL QL NAA+PROBE: NEGATIVE
EC STX1+STX2 GENES STL QL NAA+PROBE: NEGATIVE
ETEC ELTA+ESTB GENES STL QL NAA+PROBE: NEGATIVE
P SHIGELLOIDES DNA STL QL NAA+PROBE: NEGATIVE
SALMONELLA SP SPAO STL QL NAA+PROBE: NEGATIVE
SHIGELLA SP+EIEC IPAH STL QL NAA+PROBE: NEGATIVE
V CHOL+PARA+VUL DNA STL QL NAA+NON-PROBE: NEGATIVE
Y ENTEROCOL DNA STL QL NAA+NON-PROBE: NEGATIVE

## 2024-06-21 PROCEDURE — G8427 DOCREV CUR MEDS BY ELIG CLIN: HCPCS | Performed by: NURSE PRACTITIONER

## 2024-06-21 PROCEDURE — 1090F PRES/ABSN URINE INCON ASSESS: CPT | Performed by: NURSE PRACTITIONER

## 2024-06-21 PROCEDURE — 1123F ACP DISCUSS/DSCN MKR DOCD: CPT | Performed by: NURSE PRACTITIONER

## 2024-06-21 PROCEDURE — G8399 PT W/DXA RESULTS DOCUMENT: HCPCS | Performed by: NURSE PRACTITIONER

## 2024-06-21 PROCEDURE — 99214 OFFICE O/P EST MOD 30 MIN: CPT | Performed by: NURSE PRACTITIONER

## 2024-06-21 PROCEDURE — 1036F TOBACCO NON-USER: CPT | Performed by: NURSE PRACTITIONER

## 2024-06-21 PROCEDURE — 3074F SYST BP LT 130 MM HG: CPT | Performed by: NURSE PRACTITIONER

## 2024-06-21 PROCEDURE — 3078F DIAST BP <80 MM HG: CPT | Performed by: NURSE PRACTITIONER

## 2024-06-21 PROCEDURE — G8419 CALC BMI OUT NRM PARAM NOF/U: HCPCS | Performed by: NURSE PRACTITIONER

## 2024-06-21 RX ORDER — TINIDAZOLE 500 MG/1
2 TABLET ORAL ONCE
Qty: 4 TABLET | Refills: 0 | Status: SHIPPED | OUTPATIENT
Start: 2024-06-21 | End: 2024-06-21

## 2024-06-21 NOTE — PROGRESS NOTES
Daniele gómez    JOINT REPLACEMENT      KNEE ARTHROSCOPY Right     x 2    SHOULDER ARTHROSCOPY Left     TONSILLECTOMY      TOTAL KNEE ARTHROPLASTY Right 01/28/2020      Social History     Tobacco Use    Smoking status: Never     Passive exposure: Never    Smokeless tobacco: Never   Substance Use Topics    Alcohol use: Yes     Alcohol/week: 2.0 standard drinks of alcohol     Types: 2 Drinks containing 0.5 oz of alcohol per week     Comment: occasional      Family History   Problem Relation Age of Onset    Cancer Mother         uterus/skin cancer    Mult Sclerosis Mother     Cancer Father 65        colon    Parkinson's Disease Sister 59    Mult Sclerosis Sister     Mult Sclerosis Brother     Suicide Brother         Physical Exam   Vitals:       BP (!) 122/58 (Site: Left Upper Arm, Position: Sitting, Cuff Size: Small Adult)   Pulse 93   Temp 98.4 °F (36.9 °C) (Oral)   Resp 16   Ht 1.727 m (5' 8\")   Wt 80.9 kg (178 lb 6.4 oz)   SpO2 95%   BMI 27.13 kg/m²      Physical Exam  Vitals reviewed.   Constitutional:       General: She is not in acute distress.     Appearance: Normal appearance. She is not ill-appearing.   Cardiovascular:      Rate and Rhythm: Normal rate and regular rhythm.      Pulses: Normal pulses.      Heart sounds: Normal heart sounds. No murmur heard.     No friction rub.   Pulmonary:      Effort: Pulmonary effort is normal. No respiratory distress.      Breath sounds: Normal breath sounds. No wheezing or rales.   Chest:      Chest wall: No tenderness.   Abdominal:      General: Bowel sounds are normal. There is no distension.      Palpations: Abdomen is soft.      Tenderness: There is no abdominal tenderness. There is no right CVA tenderness or left CVA tenderness.   Musculoskeletal:         General: Normal range of motion.      Cervical back: Normal range of motion. No rigidity or tenderness.      Right lower leg: No edema.      Left lower leg: No edema.   Lymphadenopathy:      Cervical: No

## 2024-06-22 LAB — LYME ANTIBODY: NEGATIVE

## 2024-06-27 ENCOUNTER — APPOINTMENT (OUTPATIENT)
Facility: HOSPITAL | Age: 74
End: 2024-06-27
Payer: MEDICARE

## 2024-06-27 ENCOUNTER — HOSPITAL ENCOUNTER (EMERGENCY)
Facility: HOSPITAL | Age: 74
Discharge: HOME | End: 2024-06-27
Attending: EMERGENCY MEDICINE
Payer: MEDICARE

## 2024-06-27 VITALS
SYSTOLIC BLOOD PRESSURE: 124 MMHG | DIASTOLIC BLOOD PRESSURE: 72 MMHG | WEIGHT: 178.35 LBS | HEIGHT: 68 IN | BODY MASS INDEX: 27.03 KG/M2 | HEART RATE: 93 BPM | TEMPERATURE: 98.1 F | RESPIRATION RATE: 18 BRPM | OXYGEN SATURATION: 95 %

## 2024-06-27 DIAGNOSIS — G43.909 MIGRAINE WITHOUT STATUS MIGRAINOSUS, NOT INTRACTABLE, UNSPECIFIED MIGRAINE TYPE: Primary | ICD-10-CM

## 2024-06-27 LAB
ALBUMIN SERPL-MCNC: 3.4 G/DL (ref 3.5–5)
ALBUMIN/GLOB SERPL: 0.9 (ref 1.1–2.2)
ALP SERPL-CCNC: 53 U/L (ref 45–117)
ALT SERPL-CCNC: 22 U/L (ref 12–78)
ANION GAP SERPL CALC-SCNC: 3 MMOL/L (ref 5–15)
AST SERPL-CCNC: 16 U/L (ref 15–37)
BASOPHILS # BLD: 0.1 K/UL (ref 0–0.1)
BASOPHILS NFR BLD: 2 % (ref 0–1)
BILIRUB SERPL-MCNC: 0.4 MG/DL (ref 0.2–1)
BUN SERPL-MCNC: 18 MG/DL (ref 6–20)
BUN/CREAT SERPL: 21 (ref 12–20)
CALCIUM SERPL-MCNC: 9.4 MG/DL (ref 8.5–10.1)
CHLORIDE SERPL-SCNC: 108 MMOL/L (ref 97–108)
CO2 SERPL-SCNC: 28 MMOL/L (ref 21–32)
CREAT SERPL-MCNC: 0.85 MG/DL (ref 0.55–1.02)
DIFFERENTIAL METHOD BLD: ABNORMAL
EOSINOPHIL # BLD: 0.6 K/UL (ref 0–0.4)
EOSINOPHIL NFR BLD: 9 % (ref 0–7)
ERYTHROCYTE [DISTWIDTH] IN BLOOD BY AUTOMATED COUNT: 14.8 % (ref 11.5–14.5)
G LAMBLIA AG STL QL IA: NEGATIVE
GLOBULIN SER CALC-MCNC: 3.8 G/DL (ref 2–4)
GLUCOSE SERPL-MCNC: 109 MG/DL (ref 65–100)
HCT VFR BLD AUTO: 37.9 % (ref 35–47)
HGB BLD-MCNC: 12.3 G/DL (ref 11.5–16)
IMM GRANULOCYTES # BLD AUTO: 0 K/UL (ref 0–0.04)
IMM GRANULOCYTES NFR BLD AUTO: 0 % (ref 0–0.5)
LIPASE SERPL-CCNC: 41 U/L (ref 13–75)
LYMPHOCYTES # BLD: 1.9 K/UL (ref 0.8–3.5)
LYMPHOCYTES NFR BLD: 29 % (ref 12–49)
MCH RBC QN AUTO: 27.5 PG (ref 26–34)
MCHC RBC AUTO-ENTMCNC: 32.5 G/DL (ref 30–36.5)
MCV RBC AUTO: 84.8 FL (ref 80–99)
MONOCYTES # BLD: 0.6 K/UL (ref 0–1)
MONOCYTES NFR BLD: 9 % (ref 5–13)
NEUTS SEG # BLD: 3.3 K/UL (ref 1.8–8)
NEUTS SEG NFR BLD: 51 % (ref 32–75)
NRBC # BLD: 0 K/UL (ref 0–0.01)
NRBC BLD-RTO: 0 PER 100 WBC
O+P STL CONC: NORMAL
PLATELET # BLD AUTO: 371 K/UL (ref 150–400)
PMV BLD AUTO: 9.7 FL (ref 8.9–12.9)
POTASSIUM SERPL-SCNC: 3.8 MMOL/L (ref 3.5–5.1)
PROT SERPL-MCNC: 7.2 G/DL (ref 6.4–8.2)
RBC # BLD AUTO: 4.47 M/UL (ref 3.8–5.2)
SODIUM SERPL-SCNC: 139 MMOL/L (ref 136–145)
SPECIMEN SOURCE: NORMAL
WBC # BLD AUTO: 6.4 K/UL (ref 3.6–11)

## 2024-06-27 PROCEDURE — 2580000003 HC RX 258: Performed by: EMERGENCY MEDICINE

## 2024-06-27 PROCEDURE — 6360000002 HC RX W HCPCS: Performed by: EMERGENCY MEDICINE

## 2024-06-27 PROCEDURE — 36415 COLL VENOUS BLD VENIPUNCTURE: CPT

## 2024-06-27 PROCEDURE — 83690 ASSAY OF LIPASE: CPT

## 2024-06-27 PROCEDURE — 99284 EMERGENCY DEPT VISIT MOD MDM: CPT

## 2024-06-27 PROCEDURE — 96375 TX/PRO/DX INJ NEW DRUG ADDON: CPT

## 2024-06-27 PROCEDURE — 70450 CT HEAD/BRAIN W/O DYE: CPT

## 2024-06-27 PROCEDURE — 80053 COMPREHEN METABOLIC PANEL: CPT

## 2024-06-27 PROCEDURE — 85025 COMPLETE CBC W/AUTO DIFF WBC: CPT

## 2024-06-27 PROCEDURE — 96374 THER/PROPH/DIAG INJ IV PUSH: CPT

## 2024-06-27 RX ORDER — BUTALBITAL, ACETAMINOPHEN AND CAFFEINE 300; 40; 50 MG/1; MG/1; MG/1
1 CAPSULE ORAL EVERY 4 HOURS PRN
Qty: 20 CAPSULE | Refills: 0 | Status: SHIPPED | OUTPATIENT
Start: 2024-06-27

## 2024-06-27 RX ORDER — 0.9 % SODIUM CHLORIDE 0.9 %
1000 INTRAVENOUS SOLUTION INTRAVENOUS ONCE
Status: COMPLETED | OUTPATIENT
Start: 2024-06-27 | End: 2024-06-27

## 2024-06-27 RX ORDER — DEXAMETHASONE SODIUM PHOSPHATE 4 MG/ML
4 INJECTION, SOLUTION INTRA-ARTICULAR; INTRALESIONAL; INTRAMUSCULAR; INTRAVENOUS; SOFT TISSUE ONCE
Status: COMPLETED | OUTPATIENT
Start: 2024-06-27 | End: 2024-06-27

## 2024-06-27 RX ORDER — DIPHENHYDRAMINE HYDROCHLORIDE 50 MG/ML
25 INJECTION INTRAMUSCULAR; INTRAVENOUS
Status: COMPLETED | OUTPATIENT
Start: 2024-06-27 | End: 2024-06-27

## 2024-06-27 RX ORDER — KETOROLAC TROMETHAMINE 15 MG/ML
15 INJECTION, SOLUTION INTRAMUSCULAR; INTRAVENOUS
Status: COMPLETED | OUTPATIENT
Start: 2024-06-27 | End: 2024-06-27

## 2024-06-27 RX ORDER — PROCHLORPERAZINE EDISYLATE 5 MG/ML
10 INJECTION INTRAMUSCULAR; INTRAVENOUS ONCE
Status: COMPLETED | OUTPATIENT
Start: 2024-06-27 | End: 2024-06-27

## 2024-06-27 RX ADMIN — KETOROLAC TROMETHAMINE 15 MG: 15 INJECTION, SOLUTION INTRAMUSCULAR; INTRAVENOUS at 10:17

## 2024-06-27 RX ADMIN — PROCHLORPERAZINE EDISYLATE 10 MG: 5 INJECTION INTRAMUSCULAR; INTRAVENOUS at 10:17

## 2024-06-27 RX ADMIN — DEXAMETHASONE SODIUM PHOSPHATE 4 MG: 4 INJECTION, SOLUTION INTRAMUSCULAR; INTRAVENOUS at 10:17

## 2024-06-27 RX ADMIN — DIPHENHYDRAMINE HYDROCHLORIDE 25 MG: 50 INJECTION INTRAMUSCULAR; INTRAVENOUS at 10:17

## 2024-06-27 RX ADMIN — SODIUM CHLORIDE 1000 ML: 9 INJECTION, SOLUTION INTRAVENOUS at 10:18

## 2024-06-27 ASSESSMENT — PAIN DESCRIPTION - DESCRIPTORS: DESCRIPTORS: CRUSHING

## 2024-06-27 ASSESSMENT — PAIN DESCRIPTION - LOCATION
LOCATION: HEAD
LOCATION: HEAD

## 2024-06-27 ASSESSMENT — PAIN DESCRIPTION - ORIENTATION
ORIENTATION: MID
ORIENTATION: MID

## 2024-06-27 ASSESSMENT — PAIN - FUNCTIONAL ASSESSMENT: PAIN_FUNCTIONAL_ASSESSMENT: 0-10

## 2024-06-27 ASSESSMENT — PAIN SCALES - GENERAL
PAINLEVEL_OUTOF10: 5
PAINLEVEL_OUTOF10: 5

## 2024-06-27 NOTE — ED PROVIDER NOTES
[facility] EMERGENCY DEPT  EMERGENCY DEPARTMENT ENCOUNTER      Pt Name: Coty Jackson  MRN: [id number]  Birthdate 1950  Date of evaluation: 2024  Provider: Pola Garza DO      HISTORY OF PRESENT ILLNESS      HPI  74-year-old female with history of migraines presents to the emergency department noting a 2-1/2-week history of headache with associated nausea, vomiting and light sensitivity.  She currently describes her headache as 6/10 in severity.  She states that she had a fever and diarrhea the first couple of days but those symptoms went away.  She states that she has migraines normally takes Imitrex but she states that she takes so much of it that it does not work anymore.  She has been taking Benadryl as well to no avail of her symptoms.  She has not taken any medications today.  No head injury, no fever for the last week or 2.  She denies any focal numbness or weakness.  She recently went camping but no known sick contacts other than her dog who recently tested positive for Giardia but she denies any further GI symptoms for the last week.      Nursing Notes were reviewed.    REVIEW OF SYSTEMS         Review of Systems        PAST MEDICAL HISTORY     Past Medical History:   Diagnosis Date    Arthritis     Asthma     Back pain     Elevated lipids 09/15/2015    Essential hypertension 09/15/2015    H/O mammogram 2023    low risk    Hypercholesterolemia     Hypothyroidism, adult 09/15/2015    Migraine     allergy mediated in sping and fall    Pap smear for cervical cancer screening 2022    normal    Sun-damaged skin          SURGICAL HISTORY       Past Surgical History:   Procedure Laterality Date    BREAST BIOPSY Left yrs ago    neg; surgical bx    CATARACT EXTRACTION W/ INTRAOCULAR LENS IMPLANT Bilateral 2024     SECTION      CHOLECYSTECTOMY      COLONOSCOPY N/A 10/04/2019    COLONOSCOPY performed by Mady Collins MD at [facility] ENDOSCOPY    CYST INCISION AND DRAINAGE Left over

## 2024-06-27 NOTE — ED TRIAGE NOTES
Pt arrives to the ED with complaints of a headache since 6/10, patient reports she had a fever and diarrhea the first couple of days and then those sx went away but has had a headache ever since. Pt reports she has migraines and normally takes Imitrex but says she takes so much of it that it doesn't work. Pt has been taking benadryl as well, with no relief. Pt has not taken any medications today.

## 2024-07-03 RX ORDER — ESCITALOPRAM OXALATE 20 MG/1
TABLET ORAL
Qty: 90 TABLET | Refills: 0 | Status: SHIPPED | OUTPATIENT
Start: 2024-07-03 | End: 2024-07-03 | Stop reason: SDUPTHER

## 2024-07-03 NOTE — TELEPHONE ENCOUNTER
Received request from patient's pharmacy, [facility] [address], for refill on Lexapro 20mg QD.   Last seen in this office on 6/21/2024  Medication started on 01/01/2024.  Medication placed in pending for provider review and approval.   Refill instruction states patient must have visit before refill. Patient just seen in office.    PCP: Henna Mederos MD    Last appt: 6/21/2024   No future appointments.    -Lexapro 20mg QD      Cha \"Errol\" ROD Fuentes

## 2024-07-04 RX ORDER — ESCITALOPRAM OXALATE 20 MG/1
TABLET ORAL
Qty: 90 TABLET | Refills: 1 | Status: SHIPPED | OUTPATIENT
Start: 2024-07-03

## 2024-07-08 RX ORDER — ROSUVASTATIN CALCIUM 20 MG/1
20 TABLET, COATED ORAL DAILY
Qty: 90 TABLET | Refills: 1 | Status: SHIPPED | OUTPATIENT
Start: 2024-07-08

## 2024-08-01 RX ORDER — SUMATRIPTAN 100 MG/1
100 TABLET, FILM COATED ORAL DAILY
Qty: 20 TABLET | Refills: 0 | Status: SHIPPED | OUTPATIENT
Start: 2024-08-01

## 2024-08-01 RX ORDER — ALBUTEROL SULFATE 90 UG/1
AEROSOL, METERED RESPIRATORY (INHALATION)
Qty: 8.5 G | Refills: 0 | Status: SHIPPED | OUTPATIENT
Start: 2024-08-01

## 2024-08-01 NOTE — TELEPHONE ENCOUNTER
PCP: Henna Mederos MD    Last appt: 6/21/2024   No future appointments.    Requested Prescriptions     Pending Prescriptions Disp Refills    albuterol sulfate HFA (PROVENTIL;VENTOLIN;PROAIR) 108 (90 Base) MCG/ACT inhaler 8.5 g 0     Sig: INHALE 2 PUFFS BY MOUTH EVERY 4 HOURS AS NEEDED FOR WHEEZING OR SHORTNESS OF BREATH **MUST CALL MD FOR APPOINTMENT    SUMAtriptan (IMITREX) 100 MG tablet 20 tablet 0     Sig: Take 1 tablet by mouth daily     Received refill request from patient via [facility] for refill to [facility] for albuterol sulfate  mcg/act inhaler, 2 puffs Q 4 hrs PRN, 8.5g with 0 refills and sumatriptan 100mg tab, 1 tab QD, 20 tabs with 0 refills. Rx in pending for provider review and approval.    Cha \"Errol\" ROD Fuentes

## 2024-08-16 RX ORDER — LEVOTHYROXINE SODIUM 0.07 MG/1
75 TABLET ORAL
Qty: 90 TABLET | Refills: 0 | Status: SHIPPED | OUTPATIENT
Start: 2024-08-16

## 2024-08-20 RX ORDER — MONTELUKAST SODIUM 10 MG/1
10 TABLET ORAL DAILY
Qty: 90 TABLET | Refills: 1 | Status: SHIPPED | OUTPATIENT
Start: 2024-08-20

## 2024-08-20 NOTE — TELEPHONE ENCOUNTER
PCP: Henna Mederos MD    Last appt: 6/21/2024   No future appointments.    Requested Prescriptions     Pending Prescriptions Disp Refills    montelukast (SINGULAIR) 10 MG tablet [Pharmacy Med Name: MONTELUKAST SOD 10 MG TABLET] 90 tablet 1     Sig: TAKE 1 TABLET BY MOUTH DAILY       Cha \"[place]\" ROD Fuentes

## 2024-09-26 ENCOUNTER — TELEPHONE (OUTPATIENT)
Age: 74
End: 2024-09-26

## 2024-09-27 RX ORDER — SUMATRIPTAN 100 MG/1
100 TABLET, FILM COATED ORAL DAILY
Qty: 20 TABLET | Refills: 0 | Status: SHIPPED | OUTPATIENT
Start: 2024-09-27

## 2024-09-27 RX ORDER — ESTRADIOL 10 UG/1
10 INSERT VAGINAL
Qty: 24 TABLET | Refills: 0 | Status: SHIPPED | OUTPATIENT
Start: 2024-09-30 | End: 2024-12-29

## 2024-10-16 RX ORDER — FLUTICASONE PROPIONATE AND SALMETEROL 250; 50 UG/1; UG/1
1 POWDER RESPIRATORY (INHALATION) 2 TIMES DAILY
Qty: 1 EACH | Refills: 0 | Status: SHIPPED | OUTPATIENT
Start: 2024-10-16 | End: 2024-11-15

## 2024-10-20 DIAGNOSIS — I10 ESSENTIAL HYPERTENSION: ICD-10-CM

## 2024-10-21 RX ORDER — LOSARTAN POTASSIUM AND HYDROCHLOROTHIAZIDE 12.5; 5 MG/1; MG/1
1 TABLET ORAL DAILY
Qty: 90 TABLET | Refills: 1 | Status: SHIPPED | OUTPATIENT
Start: 2024-10-21

## 2024-11-15 RX ORDER — LEVOTHYROXINE SODIUM 75 UG/1
75 TABLET ORAL
Qty: 90 TABLET | Refills: 0 | Status: SHIPPED | OUTPATIENT
Start: 2024-11-15

## 2024-11-15 NOTE — TELEPHONE ENCOUNTER
PCP: Henna Mederos MD    Last appt: 6/21/2024   No future appointments.    Requested Prescriptions     Pending Prescriptions Disp Refills    levothyroxine (SYNTHROID) 75 MCG tablet [Pharmacy Med Name: LEVOTHYROXINE 75 MCG TABLET] 90 tablet 0     Sig: TAKE 1 TABLET BY MOUTH DAILY BEFORE BREAKFAST       Last ordered 8/16/24    Cha \"Errol\" ROD Fuentes

## 2025-01-15 RX ORDER — ESTRADIOL 10 UG/1
10 INSERT VAGINAL
Qty: 24 TABLET | Refills: 0 | OUTPATIENT
Start: 2025-01-16 | End: 2025-04-16

## 2025-01-15 RX ORDER — SUMATRIPTAN SUCCINATE 100 MG/1
100 TABLET ORAL DAILY
Qty: 20 TABLET | Refills: 0 | Status: SHIPPED | OUTPATIENT
Start: 2025-01-15

## 2025-01-28 ENCOUNTER — TELEPHONE (OUTPATIENT)
Age: 75
End: 2025-01-28

## 2025-01-28 RX ORDER — ESTRADIOL 10 UG/1
10 INSERT VAGINAL
Qty: 8 TABLET | Refills: 0 | Status: SHIPPED | OUTPATIENT
Start: 2025-01-30 | End: 2025-04-30

## 2025-01-28 NOTE — TELEPHONE ENCOUNTER
Two patient identifiers used      74 year old patient last seen in the office on 12/27/2023 for ae and has upcoming ae on 2/7/2025.    Patient calling to ask for refill of her Medication  Estradiol (VAGIFEM) 10 MCG TABS vaginal tablet [[id number]]  Estradiol (VAGIFEM) 10 MCG TABS vaginal tablet [[id number]]  ENDED    Order Details  Dose: 10 mcg Route: Vaginal Frequency: TWICE WEEKLY   Dispense Quantity: 24 tablet Refills: 0          Sig: Place 1 tablet vaginally Twice a Week         Start Date: 09/30/24 End Date: 12/29/24 after 26 doses   Written Date: 09/27/24 Expiration Date: 09/27/25   Original Order: Estradiol (VAGIFEM) 10 MCG TABS vaginal tablet [[id number]]   Providers    Authorizing Provider: Jacqueline Mcgovern MD  NPI: [id number]   Ordering User: Jacqueline cMgovern MD          Pharmacy    [facility] [id number] [address] - P [phone] - F [phone]  [address] [place] [id number]  Phone: [phone]  Fax: [phone]   To get to her appointment.    Prescription sent as per MD verbal order to get patient to her scheduled appointment,.    Patient advised of need to keep appointment in order to get further refills.    Patient verbalized understanding.

## 2025-02-07 ENCOUNTER — OFFICE VISIT (OUTPATIENT)
Age: 75
End: 2025-02-07
Payer: MEDICARE

## 2025-02-07 VITALS
HEART RATE: 89 BPM | BODY MASS INDEX: 26.61 KG/M2 | WEIGHT: 175 LBS | SYSTOLIC BLOOD PRESSURE: 138 MMHG | DIASTOLIC BLOOD PRESSURE: 87 MMHG

## 2025-02-07 DIAGNOSIS — Z01.419 ENCOUNTER FOR GYNECOLOGICAL EXAMINATION: Primary | ICD-10-CM

## 2025-02-07 DIAGNOSIS — N95.2 VAGINAL ATROPHY: ICD-10-CM

## 2025-02-07 DIAGNOSIS — Z12.4 CERVICAL CANCER SCREENING: ICD-10-CM

## 2025-02-07 PROCEDURE — 1160F RVW MEDS BY RX/DR IN RCRD: CPT | Performed by: OBSTETRICS & GYNECOLOGY

## 2025-02-07 PROCEDURE — G0101 CA SCREEN;PELVIC/BREAST EXAM: HCPCS | Performed by: OBSTETRICS & GYNECOLOGY

## 2025-02-07 PROCEDURE — 3079F DIAST BP 80-89 MM HG: CPT | Performed by: OBSTETRICS & GYNECOLOGY

## 2025-02-07 PROCEDURE — 3075F SYST BP GE 130 - 139MM HG: CPT | Performed by: OBSTETRICS & GYNECOLOGY

## 2025-02-07 PROCEDURE — 1159F MED LIST DOCD IN RCRD: CPT | Performed by: OBSTETRICS & GYNECOLOGY

## 2025-02-07 RX ORDER — ESTRADIOL 10 UG/1
10 INSERT VAGINAL
Qty: 24 TABLET | Refills: 4 | Status: SHIPPED | OUTPATIENT
Start: 2025-02-10 | End: 2025-05-11

## 2025-02-07 NOTE — PROGRESS NOTES
Coty Jackson is a 74 y.o. female returns for an annual exam     Chief Complaint   Patient presents with    Annual Exam       No LMP recorded. Patient is postmenopausal.  Her periods are absent  Problems: no problems  Birth Control: post menopausal status.  Last Pap: see report obtained 3 year(s) ago.  She does not have a history of ELVIN 2, 3 or cervical cancer.   Last Mammogram: has not had a recent mammogram.  It was 12/27/2023 see report .   Last Bone Density: see report obtained 6 year(s) ago.  Last colonoscopy: see report obtained 6 year(s) ago.      1. Have you been to the ER, urgent care clinic, or hospitalized since your last visit? No    2. Have you seen or consulted any other health care providers outside of the [facility] since your last visit? No    Examination chaperoned by Sheila Solis LPN.  
of Onset    Cancer Mother         uterus/skin cancer    Mult Sclerosis Mother     Cancer Father 65        colon    Parkinson's Disease Sister 59    Mult Sclerosis Sister     Mult Sclerosis Brother     Suicide Brother      Social History     Socioeconomic History    Marital status:      Spouse name: Not on file    Number of children: Not on file    Years of education: Not on file    Highest education level: Not on file   Occupational History    Not on file   Tobacco Use    Smoking status: Never     Passive exposure: Never    Smokeless tobacco: Never   Vaping Use    Vaping status: Never Used   Substance and Sexual Activity    Alcohol use: Yes     Alcohol/week: 2.0 standard drinks of alcohol     Types: 2 Drinks containing 0.5 oz of alcohol per week     Comment: occasional    Drug use: No    Sexual activity: Yes     Partners: Male   Other Topics Concern    Not on file   Social History Narrative    Retired from [employer] in [place]  Stressful    Hx of situational stress   passed cancer duodenal  Mother was sick 93 yo with MS actually did well    Recently   In March 2017    1 son, doing well     Social Determinants of Health     Financial Resource Strain: Low Risk  (8/8/2023)    Overall Financial Resource Strain (CARDIA)     Difficulty of Paying Living Expenses: Not hard at all   Food Insecurity: Not on file (8/8/2023)   Transportation Needs: Unknown (8/8/2023)    PRAPARE - Transportation     Lack of Transportation (Medical): Not on file     Lack of Transportation (Non-Medical): No   Physical Activity: Not on file   Stress: Not on file   Social Connections: Not on file   Intimate Partner Violence: Not At Risk (11/28/2022)    Humiliation, Afraid, Rape, and Kick questionnaire     Fear of Current or Ex-Partner: No     Emotionally Abused: No     Physically Abused: No     Sexually Abused: No   Housing Stability: Unknown (8/8/2023)    Housing Stability Vital Sign     Unable to Pay for Housing in the

## 2025-02-10 RX ORDER — MONTELUKAST SODIUM 10 MG/1
10 TABLET ORAL DAILY
Qty: 90 TABLET | Refills: 0 | Status: SHIPPED | OUTPATIENT
Start: 2025-02-10

## 2025-02-10 RX ORDER — LEVOTHYROXINE SODIUM 75 UG/1
75 TABLET ORAL
Qty: 90 TABLET | Refills: 0 | Status: SHIPPED | OUTPATIENT
Start: 2025-02-10

## 2025-02-14 RX ORDER — ESTRADIOL 10 UG/1
10 INSERT VAGINAL
Qty: 24 TABLET | Refills: 4 | OUTPATIENT
Start: 2025-02-17 | End: 2025-05-18

## 2025-02-20 LAB
., LABCORP: ABNORMAL
CYTOLOGIST CVX/VAG CYTO: ABNORMAL
CYTOLOGY CVX/VAG DOC CYTO: ABNORMAL
CYTOLOGY CVX/VAG DOC THIN PREP: ABNORMAL
DX ICD CODE: ABNORMAL
DX ICD CODE: ABNORMAL
HPV I/H RISK 4 DNA CVX QL PROBE+SIG AMP: NEGATIVE
OTHER STN SPEC: ABNORMAL
PATHOLOGIST CVX/VAG CYTO: ABNORMAL
RECOM F/U CVX/VAG CYTO: ABNORMAL
SERVICE CMNT-IMP: ABNORMAL
STAT OF ADQ CVX/VAG CYTO-IMP: ABNORMAL

## 2025-03-26 SDOH — HEALTH STABILITY: PHYSICAL HEALTH: ON AVERAGE, HOW MANY DAYS PER WEEK DO YOU ENGAGE IN MODERATE TO STRENUOUS EXERCISE (LIKE A BRISK WALK)?: 7 DAYS

## 2025-03-26 SDOH — HEALTH STABILITY: PHYSICAL HEALTH: ON AVERAGE, HOW MANY MINUTES DO YOU ENGAGE IN EXERCISE AT THIS LEVEL?: 60 MIN

## 2025-03-26 ASSESSMENT — PATIENT HEALTH QUESTIONNAIRE - PHQ9
SUM OF ALL RESPONSES TO PHQ QUESTIONS 1-9: 0
2. FEELING DOWN, DEPRESSED OR HOPELESS: NOT AT ALL
SUM OF ALL RESPONSES TO PHQ QUESTIONS 1-9: 0
1. LITTLE INTEREST OR PLEASURE IN DOING THINGS: NOT AT ALL
SUM OF ALL RESPONSES TO PHQ QUESTIONS 1-9: 0
SUM OF ALL RESPONSES TO PHQ QUESTIONS 1-9: 0

## 2025-03-26 ASSESSMENT — LIFESTYLE VARIABLES
HOW OFTEN DO YOU HAVE A DRINK CONTAINING ALCOHOL: 3
HOW MANY STANDARD DRINKS CONTAINING ALCOHOL DO YOU HAVE ON A TYPICAL DAY: 1 OR 2
HOW MANY STANDARD DRINKS CONTAINING ALCOHOL DO YOU HAVE ON A TYPICAL DAY: 1
HOW OFTEN DO YOU HAVE SIX OR MORE DRINKS ON ONE OCCASION: 1
HOW OFTEN DO YOU HAVE A DRINK CONTAINING ALCOHOL: 2-4 TIMES A MONTH

## 2025-03-30 SDOH — ECONOMIC STABILITY: FOOD INSECURITY: WITHIN THE PAST 12 MONTHS, THE FOOD YOU BOUGHT JUST DIDN'T LAST AND YOU DIDN'T HAVE MONEY TO GET MORE.: NEVER TRUE

## 2025-03-30 SDOH — ECONOMIC STABILITY: FOOD INSECURITY: WITHIN THE PAST 12 MONTHS, YOU WORRIED THAT YOUR FOOD WOULD RUN OUT BEFORE YOU GOT MONEY TO BUY MORE.: NEVER TRUE

## 2025-03-30 SDOH — ECONOMIC STABILITY: INCOME INSECURITY: IN THE LAST 12 MONTHS, WAS THERE A TIME WHEN YOU WERE NOT ABLE TO PAY THE MORTGAGE OR RENT ON TIME?: NO

## 2025-03-30 SDOH — ECONOMIC STABILITY: TRANSPORTATION INSECURITY
IN THE PAST 12 MONTHS, HAS THE LACK OF TRANSPORTATION KEPT YOU FROM MEDICAL APPOINTMENTS OR FROM GETTING MEDICATIONS?: NO

## 2025-03-30 SDOH — ECONOMIC STABILITY: TRANSPORTATION INSECURITY
IN THE PAST 12 MONTHS, HAS LACK OF TRANSPORTATION KEPT YOU FROM MEETINGS, WORK, OR FROM GETTING THINGS NEEDED FOR DAILY LIVING?: NO

## 2025-04-02 ENCOUNTER — OFFICE VISIT (OUTPATIENT)
Facility: CLINIC | Age: 75
End: 2025-04-02
Payer: MEDICARE

## 2025-04-02 VITALS
SYSTOLIC BLOOD PRESSURE: 135 MMHG | WEIGHT: 179.6 LBS | BODY MASS INDEX: 27.22 KG/M2 | RESPIRATION RATE: 14 BRPM | HEART RATE: 75 BPM | OXYGEN SATURATION: 98 % | DIASTOLIC BLOOD PRESSURE: 95 MMHG | HEIGHT: 68 IN

## 2025-04-02 DIAGNOSIS — Z00.00 MEDICARE ANNUAL WELLNESS VISIT, SUBSEQUENT: Primary | ICD-10-CM

## 2025-04-02 DIAGNOSIS — K76.89 BENIGN LIVER CYST: ICD-10-CM

## 2025-04-02 DIAGNOSIS — F51.01 PRIMARY INSOMNIA: ICD-10-CM

## 2025-04-02 DIAGNOSIS — F43.9 SITUATIONAL STRESS: ICD-10-CM

## 2025-04-02 DIAGNOSIS — Z23 NEED FOR PROPHYLACTIC VACCINATION AGAINST DIPHTHERIA-TETANUS-PERTUSSIS (DTP): ICD-10-CM

## 2025-04-02 DIAGNOSIS — E03.9 ACQUIRED HYPOTHYROIDISM: ICD-10-CM

## 2025-04-02 DIAGNOSIS — Z00.00 MEDICARE ANNUAL WELLNESS VISIT, SUBSEQUENT: ICD-10-CM

## 2025-04-02 LAB
ALBUMIN SERPL-MCNC: 3.7 G/DL (ref 3.5–5)
ALBUMIN/GLOB SERPL: 1.1 (ref 1.1–2.2)
ALP SERPL-CCNC: 70 U/L (ref 45–117)
ALT SERPL-CCNC: 30 U/L (ref 12–78)
ANION GAP SERPL CALC-SCNC: 2 MMOL/L (ref 2–12)
AST SERPL-CCNC: 21 U/L (ref 15–37)
BILIRUB SERPL-MCNC: 0.6 MG/DL (ref 0.2–1)
BUN SERPL-MCNC: 12 MG/DL (ref 6–20)
BUN/CREAT SERPL: 16 (ref 12–20)
CALCIUM SERPL-MCNC: 9.3 MG/DL (ref 8.5–10.1)
CHLORIDE SERPL-SCNC: 108 MMOL/L (ref 97–108)
CHOLEST SERPL-MCNC: 258 MG/DL
CO2 SERPL-SCNC: 32 MMOL/L (ref 21–32)
CREAT SERPL-MCNC: 0.74 MG/DL (ref 0.55–1.02)
GLOBULIN SER CALC-MCNC: 3.3 G/DL (ref 2–4)
GLUCOSE SERPL-MCNC: 92 MG/DL (ref 65–100)
HDLC SERPL-MCNC: 45 MG/DL
HDLC SERPL: 5.7 (ref 0–5)
LDLC SERPL CALC-MCNC: 174.8 MG/DL (ref 0–100)
POTASSIUM SERPL-SCNC: 4 MMOL/L (ref 3.5–5.1)
PROT SERPL-MCNC: 7 G/DL (ref 6.4–8.2)
SODIUM SERPL-SCNC: 142 MMOL/L (ref 136–145)
TRIGL SERPL-MCNC: 191 MG/DL
TSH SERPL DL<=0.05 MIU/L-ACNC: 2.07 UIU/ML (ref 0.36–3.74)
VLDLC SERPL CALC-MCNC: 38.2 MG/DL

## 2025-04-02 PROCEDURE — G8419 CALC BMI OUT NRM PARAM NOF/U: HCPCS | Performed by: INTERNAL MEDICINE

## 2025-04-02 PROCEDURE — 1123F ACP DISCUSS/DSCN MKR DOCD: CPT | Performed by: INTERNAL MEDICINE

## 2025-04-02 PROCEDURE — G0439 PPPS, SUBSEQ VISIT: HCPCS | Performed by: INTERNAL MEDICINE

## 2025-04-02 PROCEDURE — G8399 PT W/DXA RESULTS DOCUMENT: HCPCS | Performed by: INTERNAL MEDICINE

## 2025-04-02 PROCEDURE — 3075F SYST BP GE 130 - 139MM HG: CPT | Performed by: INTERNAL MEDICINE

## 2025-04-02 PROCEDURE — 99213 OFFICE O/P EST LOW 20 MIN: CPT | Performed by: INTERNAL MEDICINE

## 2025-04-02 PROCEDURE — 1036F TOBACCO NON-USER: CPT | Performed by: INTERNAL MEDICINE

## 2025-04-02 PROCEDURE — 3080F DIAST BP >= 90 MM HG: CPT | Performed by: INTERNAL MEDICINE

## 2025-04-02 PROCEDURE — 1090F PRES/ABSN URINE INCON ASSESS: CPT | Performed by: INTERNAL MEDICINE

## 2025-04-02 PROCEDURE — G8428 CUR MEDS NOT DOCUMENT: HCPCS | Performed by: INTERNAL MEDICINE

## 2025-04-02 RX ORDER — ESCITALOPRAM OXALATE 20 MG/1
TABLET ORAL
Qty: 90 TABLET | Refills: 3 | Status: SHIPPED | OUTPATIENT
Start: 2025-04-02

## 2025-04-02 RX ORDER — SUMATRIPTAN SUCCINATE 100 MG/1
100 TABLET ORAL DAILY
Qty: 20 TABLET | Refills: 1 | Status: SHIPPED | OUTPATIENT
Start: 2025-04-02

## 2025-04-02 RX ORDER — MONTELUKAST SODIUM 10 MG/1
10 TABLET ORAL DAILY
Qty: 90 TABLET | Refills: 1 | Status: SHIPPED | OUTPATIENT
Start: 2025-04-02

## 2025-04-02 RX ORDER — LOSARTAN POTASSIUM 50 MG/1
50 TABLET ORAL DAILY
Qty: 30 TABLET | Refills: 0 | Status: SHIPPED | OUTPATIENT
Start: 2025-04-02

## 2025-04-02 NOTE — PROGRESS NOTES
[facility] onsite for transfer.  Report given     Artist SOO Menon  02/27/21 0345
Pt refusing CT at this time.       Sofia Munguia, SOO  02/27/21 2125
Report called to Grabiel Frost RN  02/27/21 1114
Spoke with pharmacy, currently preparing medication     Shashi Hand RN  02/27/21 6160
Vicky Dimas updated on patient diagnosis and plan to transfer downtown at patient request.     Candido Jean Baptiste RN  02/27/21 1019
1 dose, Disp-0.5 mL, R-0Print       Return in 1 year (on 4/2/2026) for Medicare AWV.     Subjective   As above    Patient's complete Health Risk Assessment and screening values have been reviewed and are found in Flowsheets. The following problems were reviewed today and where indicated follow up appointments were made and/or referrals ordered.    No Positive Risk Factors identified today.                                Objective   Vitals:    04/02/25 0943   BP: (!) 135/95   BP Site: Left Upper Arm   Patient Position: Sitting   BP Cuff Size: Small Adult   Pulse: 75   Resp: 14   SpO2: 98%   Weight: 81.5 kg (179 lb 9.6 oz)   Height: 1.727 m (5' 8\")      Body mass index is 27.31 kg/m².      As above           Allergies   Allergen Reactions    Sulfa Antibiotics Anaphylaxis     Prior to Visit Medications    Medication Sig Taking? Authorizing Provider   Tdap (ADACEL) 5-2-15.5 LF-MCG/0.5 injection Inject 0.5 mLs into the muscle once for 1 dose Yes Henna Mederos MD   levothyroxine (SYNTHROID) 75 MCG tablet TAKE 1 TABLET BY MOUTH DAILY BEFORE BREAKFAST Yes Henna Mederos MD   montelukast (SINGULAIR) 10 MG tablet TAKE 1 TABLET BY MOUTH DAILY Yes Henna Mederos MD   Estradiol (VAGIFEM) 10 MCG TABS vaginal tablet Place 1 tablet vaginally Twice a Week Yes Jacqueline Mcgovern MD   SUMAtriptan (IMITREX) 100 MG tablet Take 1 tablet by mouth daily Yes Henna Mederos MD   losartan-hydroCHLOROthiazide (HYZAAR) 50-12.5 MG per tablet Take 1 tablet by mouth daily NEEDS APPOINTMENT TO CONTINUE REFILLS Yes Breana Fernandez MD   fluticasone-salmeterol (ADVAIR) 250-50 MCG/ACT AEPB diskus inhaler Inhale 1 puff into the lungs 2 times daily Yes Henna Mederos MD   albuterol sulfate HFA (PROVENTIL;VENTOLIN;PROAIR) 108 (90 Base) MCG/ACT inhaler INHALE 2 PUFFS BY MOUTH EVERY 4 HOURS AS NEEDED FOR WHEEZING OR SHORTNESS OF BREATH **MUST CALL MD FOR APPOINTMENT Yes Henna Mederos MD

## 2025-04-04 ENCOUNTER — RESULTS FOLLOW-UP (OUTPATIENT)
Facility: CLINIC | Age: 75
End: 2025-04-04

## 2025-04-04 DIAGNOSIS — F43.9 SITUATIONAL STRESS: ICD-10-CM

## 2025-04-04 RX ORDER — ESCITALOPRAM OXALATE 20 MG/1
20 TABLET ORAL DAILY
Qty: 90 TABLET | Refills: 3 | OUTPATIENT
Start: 2025-04-04

## 2025-04-07 ENCOUNTER — PATIENT MESSAGE (OUTPATIENT)
Facility: CLINIC | Age: 75
End: 2025-04-07

## 2025-04-07 RX ORDER — ROSUVASTATIN CALCIUM 10 MG/1
10 TABLET, COATED ORAL
Qty: 90 TABLET | Refills: 3 | Status: SHIPPED | OUTPATIENT
Start: 2025-04-07 | End: 2025-04-07 | Stop reason: SDUPTHER

## 2025-04-07 RX ORDER — ROSUVASTATIN CALCIUM 10 MG/1
10 TABLET, COATED ORAL DAILY
Qty: 90 TABLET | Refills: 3 | Status: SHIPPED | OUTPATIENT
Start: 2025-04-07

## 2025-04-18 DIAGNOSIS — I10 ESSENTIAL HYPERTENSION: ICD-10-CM

## 2025-04-18 RX ORDER — LOSARTAN POTASSIUM AND HYDROCHLOROTHIAZIDE 12.5; 5 MG/1; MG/1
TABLET ORAL
Qty: 90 TABLET | Refills: 1 | OUTPATIENT
Start: 2025-04-18

## 2025-04-18 NOTE — TELEPHONE ENCOUNTER
Per verbal order from Dr. Breana Fernandez  Last appt: 12/6/2023     No future appointments.    Requested Prescriptions     Refused Prescriptions Disp Refills    losartan-hydroCHLOROthiazide (HYZAAR) 50-12.5 MG per tablet [Pharmacy Med Name: LOSARTAN-HCTZ 50-12.5 MG TAB] 90 tablet 1     Sig: TAKE 1 TABLET BY MOUTH DAILY *NEEDS AN APPOINTMENT FOR FURTHER REFILLS*     Refused By: WILLIS ULRICH     Reason for Refusal: Refill not appropriate

## 2025-05-06 DIAGNOSIS — I10 ESSENTIAL HYPERTENSION: ICD-10-CM

## 2025-05-09 RX ORDER — LOSARTAN POTASSIUM AND HYDROCHLOROTHIAZIDE 12.5; 5 MG/1; MG/1
TABLET ORAL
Qty: 90 TABLET | Refills: 1 | OUTPATIENT
Start: 2025-05-09

## 2025-05-14 DIAGNOSIS — I10 ESSENTIAL HYPERTENSION: ICD-10-CM

## 2025-05-15 ENCOUNTER — PATIENT MESSAGE (OUTPATIENT)
Age: 75
End: 2025-05-15

## 2025-05-15 DIAGNOSIS — I10 ESSENTIAL HYPERTENSION: ICD-10-CM

## 2025-05-15 RX ORDER — LEVOTHYROXINE SODIUM 75 UG/1
75 TABLET ORAL
Qty: 90 TABLET | Refills: 0 | Status: SHIPPED | OUTPATIENT
Start: 2025-05-15

## 2025-05-15 RX ORDER — LOSARTAN POTASSIUM AND HYDROCHLOROTHIAZIDE 12.5; 5 MG/1; MG/1
TABLET ORAL
Qty: 90 TABLET | Refills: 1 | OUTPATIENT
Start: 2025-05-15

## 2025-05-15 RX ORDER — LOSARTAN POTASSIUM AND HYDROCHLOROTHIAZIDE 12.5; 5 MG/1; MG/1
1 TABLET ORAL DAILY
Qty: 90 TABLET | Refills: 1 | OUTPATIENT
Start: 2025-05-15

## 2025-05-15 RX ORDER — FLUTICASONE PROPIONATE AND SALMETEROL 250; 50 UG/1; UG/1
1 POWDER RESPIRATORY (INHALATION) 2 TIMES DAILY
Qty: 1 EACH | Refills: 0 | Status: SHIPPED | OUTPATIENT
Start: 2025-05-15 | End: 2025-06-14

## 2025-05-15 RX ORDER — ALBUTEROL SULFATE 90 UG/1
INHALANT RESPIRATORY (INHALATION)
Qty: 8.5 G | Refills: 0 | Status: SHIPPED | OUTPATIENT
Start: 2025-05-15

## 2025-05-15 NOTE — TELEPHONE ENCOUNTER
Per verbal order from Dr. Breana Fernandez  Last appt: 12/6/2023     No future appointments.    Requested Prescriptions     Refused Prescriptions Disp Refills    losartan-hydroCHLOROthiazide (HYZAAR) 50-12.5 MG per tablet 90 tablet 1     Sig: Take 1 tablet by mouth daily NEEDS APPOINTMENT TO CONTINUE REFILLS     Refused By: WILLIS ULRICH     Reason for Refusal: Patient no longer under prescriber care

## 2025-05-15 NOTE — TELEPHONE ENCOUNTER
Per verbal order from Dr. Breana Fernandez  Last appt: 12/6/2023     No future appointments.    Requested Prescriptions     Refused Prescriptions Disp Refills    losartan-hydroCHLOROthiazide (HYZAAR) 50-12.5 MG per tablet [Pharmacy Med Name: LOSARTAN-HCTZ 50-12.5 MG TAB] 90 tablet 1     Sig: TAKE 1 TABLET BY MOUTH DAILY *NEEDS AN APPOINTMENT FOR FURTHER REFILLS*     Refused By: WILLIS ULRICH     Reason for Refusal: Patient no longer under prescriber care

## 2025-05-16 ENCOUNTER — TELEPHONE (OUTPATIENT)
Age: 75
End: 2025-05-16

## 2025-05-16 NOTE — TELEPHONE ENCOUNTER
Patient is calling because she needs a refill on her Hyzaar 50-12.5 mg.Patient has been out for the last 5 days.    Patient has a schedule appointment with KATHRINE JOSEPH on 7/23/25.    Pharmacy:  [facility] [id number] - [facility] [address] - P [phone] - F [phone]     [phone] patient

## 2025-05-17 DIAGNOSIS — I10 ESSENTIAL HYPERTENSION: ICD-10-CM

## 2025-05-19 RX ORDER — LOSARTAN POTASSIUM AND HYDROCHLOROTHIAZIDE 12.5; 5 MG/1; MG/1
TABLET ORAL
Qty: 30 TABLET | Refills: 1 | Status: SHIPPED | OUTPATIENT
Start: 2025-05-19

## 2025-05-19 NOTE — TELEPHONE ENCOUNTER
Requested Prescriptions             Signed Prescriptions Disp Refills    losartan-hydroCHLOROthiazide (HYZAAR) 50-12.5 MG per tablet 30 tablet 1       Sig: TAKE 1 TABLET BY MOUTH DAILY *NEEDS AN APPOINTMENT FOR FURTHER REFILLS*       Authorizing Provider: LISA CAMACHO       Ordering User: WILLIS ULRICH

## 2025-06-12 RX ORDER — LOSARTAN POTASSIUM 50 MG/1
50 TABLET ORAL DAILY
Qty: 30 TABLET | Refills: 2 | Status: SHIPPED | OUTPATIENT
Start: 2025-06-12

## 2025-07-16 DIAGNOSIS — I10 ESSENTIAL HYPERTENSION: ICD-10-CM

## 2025-07-16 RX ORDER — LOSARTAN POTASSIUM AND HYDROCHLOROTHIAZIDE 12.5; 5 MG/1; MG/1
TABLET ORAL
Qty: 60 TABLET | OUTPATIENT
Start: 2025-07-16

## 2025-07-16 NOTE — TELEPHONE ENCOUNTER
Per verbal order from Dr. Breana Fernandez  Last appt: 12/6/2023     Future Appointments   Date Time Provider Department Center   7/23/2025 10:20 AM Geneva Headley APRN - NP [facility]   9/3/2025 10:00 AM Breana Fernandez MD [facility]       Requested Prescriptions     Refused Prescriptions Disp Refills    losartan-hydroCHLOROthiazide (HYZAAR) 50-12.5 MG per tablet [Pharmacy Med Name: LOSARTAN-HCTZ 50-12.5 MG TAB] 60 tablet      Sig: TAKE 1 TABLET BY MOUTH DAILY *APPOINTMENT NEEDED*     Refused By: WILLIS ULRICH     Reason for Refusal: Refill not appropriate     PCP sent plain Losartan on 6/12.  Will See NP on 7/23

## 2025-07-23 ENCOUNTER — OFFICE VISIT (OUTPATIENT)
Age: 75
End: 2025-07-23
Payer: MEDICARE

## 2025-07-23 VITALS
WEIGHT: 185 LBS | HEIGHT: 68 IN | RESPIRATION RATE: 18 BRPM | BODY MASS INDEX: 28.04 KG/M2 | HEART RATE: 85 BPM | SYSTOLIC BLOOD PRESSURE: 146 MMHG | DIASTOLIC BLOOD PRESSURE: 92 MMHG | OXYGEN SATURATION: 98 %

## 2025-07-23 DIAGNOSIS — I10 ESSENTIAL HYPERTENSION: ICD-10-CM

## 2025-07-23 DIAGNOSIS — R00.2 PALPITATIONS: Primary | ICD-10-CM

## 2025-07-23 PROCEDURE — 99214 OFFICE O/P EST MOD 30 MIN: CPT

## 2025-07-23 PROCEDURE — 93010 ELECTROCARDIOGRAM REPORT: CPT

## 2025-07-23 PROCEDURE — 1159F MED LIST DOCD IN RCRD: CPT

## 2025-07-23 PROCEDURE — 3080F DIAST BP >= 90 MM HG: CPT

## 2025-07-23 PROCEDURE — G8427 DOCREV CUR MEDS BY ELIG CLIN: HCPCS

## 2025-07-23 PROCEDURE — G8419 CALC BMI OUT NRM PARAM NOF/U: HCPCS

## 2025-07-23 PROCEDURE — 1036F TOBACCO NON-USER: CPT

## 2025-07-23 PROCEDURE — G8399 PT W/DXA RESULTS DOCUMENT: HCPCS

## 2025-07-23 PROCEDURE — 1123F ACP DISCUSS/DSCN MKR DOCD: CPT

## 2025-07-23 PROCEDURE — 3077F SYST BP >= 140 MM HG: CPT

## 2025-07-23 PROCEDURE — 1126F AMNT PAIN NOTED NONE PRSNT: CPT

## 2025-07-23 PROCEDURE — 1090F PRES/ABSN URINE INCON ASSESS: CPT

## 2025-07-23 PROCEDURE — 93005 ELECTROCARDIOGRAM TRACING: CPT

## 2025-07-23 PROCEDURE — 1160F RVW MEDS BY RX/DR IN RCRD: CPT

## 2025-07-23 RX ORDER — LOSARTAN POTASSIUM AND HYDROCHLOROTHIAZIDE 12.5; 5 MG/1; MG/1
1 TABLET ORAL DAILY
Qty: 90 TABLET | Refills: 3 | Status: SHIPPED | OUTPATIENT
Start: 2025-07-23

## 2025-07-23 NOTE — PROGRESS NOTES
had concerns including Hypertension, Palpitations, and Cholesterol Problem.    Vitals:    07/23/25 1011 07/23/25 1029   BP: (!) 142/90 (!) 146/92   BP Site: Left Upper Arm Left Upper Arm   Patient Position: Sitting Sitting   BP Cuff Size: Medium Adult Medium Adult   Pulse: 85    Resp: 18    SpO2: 98%    Weight: 83.9 kg (185 lb)    Height: 1.727 m (5' 8\")         Chest pain No    Refills Hyzaar        1. Have you been to the ER, urgent care clinic since your last visit? No       Hospitalized since your last visit? No       Where?        Date?  
(Patient taking differently: Take 1 tablet by mouth as needed for Migraine) 20 tablet 1    Estradiol (VAGIFEM) 10 MCG TABS vaginal tablet Place 1 tablet vaginally Twice a Week (Patient taking differently: Place 1 tablet vaginally Once a week at 5 PM) 24 tablet 4    Multiple Vitamins-Minerals (MULTIVITAMIN ADULTS PO) 1 TAB ONCE A DAY      Lysine 1000 MG TABS Take 1,000 mg by mouth daily       No current facility-administered medications for this visit.       Allergies   Allergen Reactions    Sulfa Antibiotics Anaphylaxis       SOCIAL HISTORY:     Social History     Tobacco Use    Smoking status: Never     Passive exposure: Never    Smokeless tobacco: Never   Vaping Use    Vaping status: Never Used   Substance Use Topics    Alcohol use: Yes     Alcohol/week: 2.0 standard drinks of alcohol     Types: 2 Drinks containing 0.5 oz of alcohol per week     Comment: occasional    Drug use: No       FAMILY HISTORY:     Family History   Problem Relation Age of Onset    Cancer Mother         uterus/skin cancer    Mult Sclerosis Mother     Cancer Father 65        colon    Parkinson's Disease Sister 59    Mult Sclerosis Sister     Mult Sclerosis Brother     Suicide Brother        REVIEW OF SYMPTOMS:     Review of Symptoms:  Negative except as above, all other systems reviewed and are negative for a Comprehensive ROS (10+)    PHYSICAL EXAM:     Physical Exam:  BP (!) 146/92 (BP Site: Left Upper Arm, Patient Position: Sitting, BP Cuff Size: Medium Adult)   Pulse 85   Resp 18   Ht 1.727 m (5' 8\")   Wt 83.9 kg (185 lb)   SpO2 98%   BMI 28.13 kg/m²     General: alert, cooperative, no distress, appears stated age  Neck: supple, symmetrical, trachea midline, no adenopathy, thyroid: not enlarged, symmetric, no tenderness/mass/nodules, no carotid bruit, and no JVD  Lungs: clear to auscultation bilaterally  Heart: regular rate and rhythm, S1, S2 normal, no murmur, no click, rub or gallop  Abdomen: soft, non tender  Extremities:

## 2025-08-11 DIAGNOSIS — Z00.00 MEDICARE ANNUAL WELLNESS VISIT, SUBSEQUENT: ICD-10-CM

## 2025-08-11 RX ORDER — LEVOTHYROXINE SODIUM 75 UG/1
75 TABLET ORAL
Qty: 90 TABLET | Refills: 0 | Status: SHIPPED | OUTPATIENT
Start: 2025-08-11

## 2025-08-11 RX ORDER — SUMATRIPTAN SUCCINATE 100 MG/1
100 TABLET ORAL AS NEEDED
Qty: 20 TABLET | Refills: 1 | Status: SHIPPED | OUTPATIENT
Start: 2025-08-11

## 2025-08-11 RX ORDER — FLUTICASONE PROPIONATE AND SALMETEROL 250; 50 UG/1; UG/1
1 POWDER RESPIRATORY (INHALATION) 2 TIMES DAILY
Qty: 1 EACH | Refills: 0 | Status: SHIPPED | OUTPATIENT
Start: 2025-08-11 | End: 2025-09-10